# Patient Record
Sex: FEMALE | Race: WHITE | Employment: UNEMPLOYED | URBAN - METROPOLITAN AREA
[De-identification: names, ages, dates, MRNs, and addresses within clinical notes are randomized per-mention and may not be internally consistent; named-entity substitution may affect disease eponyms.]

---

## 2017-03-11 ENCOUNTER — HOSPITAL ENCOUNTER (EMERGENCY)
Age: 77
Discharge: HOME | End: 2017-03-11
Attending: STUDENT IN AN ORGANIZED HEALTH CARE EDUCATION/TRAINING PROGRAM
Payer: MEDICARE

## 2017-03-11 VITALS
HEIGHT: 64 IN | OXYGEN SATURATION: 94 % | RESPIRATION RATE: 20 BRPM | TEMPERATURE: 97.6 F | HEART RATE: 83 BPM | WEIGHT: 126 LBS | BODY MASS INDEX: 21.51 KG/M2 | SYSTOLIC BLOOD PRESSURE: 149 MMHG | DIASTOLIC BLOOD PRESSURE: 90 MMHG

## 2017-03-11 DIAGNOSIS — R04.0 EPISTAXIS: Primary | ICD-10-CM

## 2017-03-11 DIAGNOSIS — S00.412A EAR ABRASION, LEFT, INITIAL ENCOUNTER: ICD-10-CM

## 2017-03-11 PROCEDURE — 99283 EMERGENCY DEPT VISIT LOW MDM: CPT

## 2017-03-11 PROCEDURE — 30901 CONTROL OF NOSEBLEED: CPT

## 2017-03-11 PROCEDURE — 6370000000 HC RX 637 (ALT 250 FOR IP): Performed by: NURSE PRACTITIONER

## 2017-03-11 RX ORDER — AMOXICILLIN AND CLAVULANATE POTASSIUM 875; 125 MG/1; MG/1
1 TABLET, FILM COATED ORAL ONCE
Status: COMPLETED | OUTPATIENT
Start: 2017-03-11 | End: 2017-03-11

## 2017-03-11 RX ORDER — AMOXICILLIN AND CLAVULANATE POTASSIUM 875; 125 MG/1; MG/1
1 TABLET, FILM COATED ORAL 2 TIMES DAILY
Qty: 20 TABLET | Refills: 0 | Status: SHIPPED | OUTPATIENT
Start: 2017-03-11 | End: 2017-03-21

## 2017-03-11 RX ORDER — 0.9 % SODIUM CHLORIDE 0.9 %
1000 INTRAVENOUS SOLUTION INTRAVENOUS ONCE
Status: DISCONTINUED | OUTPATIENT
Start: 2017-03-11 | End: 2017-03-11

## 2017-03-11 RX ADMIN — AMOXICILLIN AND CLAVULANATE POTASSIUM 1 TABLET: 875; 125 TABLET, FILM COATED ORAL at 09:06

## 2017-03-11 ASSESSMENT — ENCOUNTER SYMPTOMS
VOMITING: 0
CHEST TIGHTNESS: 0
ABDOMINAL PAIN: 0
NAUSEA: 0
SHORTNESS OF BREATH: 0
SORE THROAT: 0

## 2017-03-14 ENCOUNTER — TELEPHONE (OUTPATIENT)
Dept: FAMILY MEDICINE CLINIC | Age: 77
End: 2017-03-14

## 2017-03-14 PROBLEM — R04.0 EPISTAXIS: Status: ACTIVE | Noted: 2017-03-14

## 2017-03-15 ENCOUNTER — OFFICE VISIT (OUTPATIENT)
Dept: CARDIOLOGY | Age: 77
End: 2017-03-15

## 2017-03-15 VITALS
OXYGEN SATURATION: 96 % | HEIGHT: 64 IN | RESPIRATION RATE: 14 BRPM | SYSTOLIC BLOOD PRESSURE: 138 MMHG | BODY MASS INDEX: 22.4 KG/M2 | HEART RATE: 67 BPM | WEIGHT: 131.2 LBS | DIASTOLIC BLOOD PRESSURE: 64 MMHG

## 2017-03-15 DIAGNOSIS — I10 BENIGN ESSENTIAL HYPERTENSION: Primary | ICD-10-CM

## 2017-03-15 DIAGNOSIS — I48.91 ATRIAL FIBRILLATION WITH RAPID VENTRICULAR RESPONSE (HCC): ICD-10-CM

## 2017-03-15 PROCEDURE — 99213 OFFICE O/P EST LOW 20 MIN: CPT | Performed by: INTERNAL MEDICINE

## 2017-03-15 ASSESSMENT — ENCOUNTER SYMPTOMS
CHEST TIGHTNESS: 0
SHORTNESS OF BREATH: 0

## 2017-03-28 ENCOUNTER — OFFICE VISIT (OUTPATIENT)
Dept: FAMILY MEDICINE CLINIC | Age: 77
End: 2017-03-28

## 2017-03-28 VITALS
BODY MASS INDEX: 24.99 KG/M2 | OXYGEN SATURATION: 97 % | HEIGHT: 62 IN | WEIGHT: 135.8 LBS | SYSTOLIC BLOOD PRESSURE: 122 MMHG | HEART RATE: 89 BPM | TEMPERATURE: 98.7 F | DIASTOLIC BLOOD PRESSURE: 84 MMHG

## 2017-03-28 DIAGNOSIS — I10 ESSENTIAL HYPERTENSION: Primary | ICD-10-CM

## 2017-03-28 DIAGNOSIS — R04.0 EPISTAXIS: ICD-10-CM

## 2017-03-28 DIAGNOSIS — I48.19 PERSISTENT ATRIAL FIBRILLATION (HCC): ICD-10-CM

## 2017-03-28 PROCEDURE — 99213 OFFICE O/P EST LOW 20 MIN: CPT | Performed by: NURSE PRACTITIONER

## 2017-03-28 RX ORDER — METOPROLOL SUCCINATE 50 MG/1
50 TABLET, EXTENDED RELEASE ORAL 2 TIMES DAILY
Qty: 60 TABLET | Refills: 5 | Status: SHIPPED | OUTPATIENT
Start: 2017-03-28 | End: 2018-02-26 | Stop reason: SDUPTHER

## 2017-03-28 ASSESSMENT — PATIENT HEALTH QUESTIONNAIRE - PHQ9
2. FEELING DOWN, DEPRESSED OR HOPELESS: 0
1. LITTLE INTEREST OR PLEASURE IN DOING THINGS: 0
SUM OF ALL RESPONSES TO PHQ QUESTIONS 1-9: 0
SUM OF ALL RESPONSES TO PHQ9 QUESTIONS 1 & 2: 0

## 2017-04-10 ENCOUNTER — OFFICE VISIT (OUTPATIENT)
Dept: FAMILY MEDICINE CLINIC | Age: 77
End: 2017-04-10

## 2017-04-10 VITALS
BODY MASS INDEX: 24.92 KG/M2 | DIASTOLIC BLOOD PRESSURE: 88 MMHG | WEIGHT: 135.4 LBS | HEIGHT: 62 IN | SYSTOLIC BLOOD PRESSURE: 130 MMHG | TEMPERATURE: 98.3 F | HEART RATE: 94 BPM | OXYGEN SATURATION: 97 %

## 2017-04-10 DIAGNOSIS — R23.8 SKIN PAPULES, GENERALIZED: ICD-10-CM

## 2017-04-10 DIAGNOSIS — I48.91 ATRIAL FIBRILLATION WITH RAPID VENTRICULAR RESPONSE (HCC): Primary | ICD-10-CM

## 2017-04-10 PROCEDURE — 99212 OFFICE O/P EST SF 10 MIN: CPT | Performed by: NURSE PRACTITIONER

## 2017-04-10 ASSESSMENT — PATIENT HEALTH QUESTIONNAIRE - PHQ9
SUM OF ALL RESPONSES TO PHQ QUESTIONS 1-9: 0
2. FEELING DOWN, DEPRESSED OR HOPELESS: 0
SUM OF ALL RESPONSES TO PHQ9 QUESTIONS 1 & 2: 0
1. LITTLE INTEREST OR PLEASURE IN DOING THINGS: 0

## 2017-05-08 ENCOUNTER — PROCEDURE VISIT (OUTPATIENT)
Dept: FAMILY MEDICINE CLINIC | Age: 77
End: 2017-05-08

## 2017-05-08 VITALS
WEIGHT: 141 LBS | HEART RATE: 79 BPM | SYSTOLIC BLOOD PRESSURE: 116 MMHG | DIASTOLIC BLOOD PRESSURE: 86 MMHG | BODY MASS INDEX: 25.95 KG/M2 | HEIGHT: 62 IN

## 2017-05-08 DIAGNOSIS — L82.0 INFLAMED SEBORRHEIC KERATOSIS: ICD-10-CM

## 2017-05-08 PROCEDURE — 17110 DESTRUCTION B9 LES UP TO 14: CPT | Performed by: FAMILY MEDICINE

## 2017-05-08 RX ORDER — LANOLIN ALCOHOL/MO/W.PET/CERES
1000 CREAM (GRAM) TOPICAL DAILY
COMMUNITY
End: 2017-06-08 | Stop reason: SDUPTHER

## 2017-05-08 ASSESSMENT — ENCOUNTER SYMPTOMS
ABDOMINAL PAIN: 0
SHORTNESS OF BREATH: 0

## 2017-06-08 ENCOUNTER — OFFICE VISIT (OUTPATIENT)
Dept: FAMILY MEDICINE CLINIC | Age: 77
End: 2017-06-08

## 2017-06-08 VITALS
SYSTOLIC BLOOD PRESSURE: 138 MMHG | OXYGEN SATURATION: 98 % | HEART RATE: 80 BPM | HEIGHT: 62 IN | WEIGHT: 136 LBS | TEMPERATURE: 98.1 F | BODY MASS INDEX: 25.03 KG/M2 | DIASTOLIC BLOOD PRESSURE: 80 MMHG

## 2017-06-08 DIAGNOSIS — L21.9 SEBORRHEIC DERMATITIS OF SCALP: ICD-10-CM

## 2017-06-08 DIAGNOSIS — I48.20 CHRONIC ATRIAL FIBRILLATION (HCC): Primary | ICD-10-CM

## 2017-06-08 DIAGNOSIS — G25.81 RESTLESS LEG: ICD-10-CM

## 2017-06-08 PROCEDURE — 99214 OFFICE O/P EST MOD 30 MIN: CPT | Performed by: NURSE PRACTITIONER

## 2017-06-08 RX ORDER — SELENIUM SULFIDE 22.5 MG/ML
1 SHAMPOO TOPICAL DAILY
Qty: 180 ML | Refills: 1 | Status: SHIPPED | OUTPATIENT
Start: 2017-06-08 | End: 2018-08-09

## 2017-06-08 RX ORDER — PRAMIPEXOLE DIHYDROCHLORIDE 0.12 MG/1
0.12 TABLET ORAL NIGHTLY
Qty: 30 TABLET | Refills: 5 | Status: SHIPPED | OUTPATIENT
Start: 2017-06-08 | End: 2017-11-20

## 2017-06-21 RX ORDER — ALENDRONATE SODIUM 70 MG/1
TABLET ORAL
Qty: 4 TABLET | Refills: 2 | Status: SHIPPED | OUTPATIENT
Start: 2017-06-21 | End: 2017-09-18 | Stop reason: SDUPTHER

## 2017-06-30 RX ORDER — SOTALOL HYDROCHLORIDE 120 MG/1
TABLET ORAL
Qty: 60 TABLET | Refills: 6 | Status: SHIPPED | OUTPATIENT
Start: 2017-06-30 | End: 2018-02-13 | Stop reason: SDUPTHER

## 2017-09-18 RX ORDER — ALENDRONATE SODIUM 70 MG/1
TABLET ORAL
Qty: 4 TABLET | Refills: 1 | Status: SHIPPED | OUTPATIENT
Start: 2017-09-18 | End: 2017-12-06 | Stop reason: SDUPTHER

## 2017-09-28 DIAGNOSIS — F41.9 ANXIETY: ICD-10-CM

## 2017-09-28 RX ORDER — SERTRALINE HYDROCHLORIDE 100 MG/1
TABLET, FILM COATED ORAL
Qty: 45 TABLET | Refills: 4 | Status: SHIPPED | OUTPATIENT
Start: 2017-09-28 | End: 2018-02-21 | Stop reason: SDUPTHER

## 2017-11-20 ENCOUNTER — OFFICE VISIT (OUTPATIENT)
Dept: FAMILY MEDICINE CLINIC | Age: 77
End: 2017-11-20

## 2017-11-20 VITALS
WEIGHT: 140.2 LBS | DIASTOLIC BLOOD PRESSURE: 88 MMHG | BODY MASS INDEX: 25.8 KG/M2 | HEART RATE: 72 BPM | OXYGEN SATURATION: 94 % | TEMPERATURE: 98.3 F | SYSTOLIC BLOOD PRESSURE: 130 MMHG | HEIGHT: 62 IN

## 2017-11-20 DIAGNOSIS — G25.81 RESTLESS LEGS: Primary | ICD-10-CM

## 2017-11-20 DIAGNOSIS — I48.19 PERSISTENT ATRIAL FIBRILLATION (HCC): ICD-10-CM

## 2017-11-20 DIAGNOSIS — T14.90XD HEALING WOUND: ICD-10-CM

## 2017-11-20 PROCEDURE — 99213 OFFICE O/P EST LOW 20 MIN: CPT | Performed by: NURSE PRACTITIONER

## 2017-11-20 RX ORDER — ASPIRIN 325 MG
325 TABLET ORAL DAILY
Status: ON HOLD | COMMUNITY
End: 2018-12-04

## 2017-11-20 RX ORDER — TIZANIDINE 2 MG/1
2 TABLET ORAL NIGHTLY PRN
Qty: 30 TABLET | Refills: 2 | Status: SHIPPED | OUTPATIENT
Start: 2017-11-20 | End: 2018-02-13 | Stop reason: ALTCHOICE

## 2017-11-20 NOTE — PROGRESS NOTES
Subjective  Chief Complaint   Patient presents with    Chronic Pain     pt sates she got sores on her rgt leg and rght arm that are not healing       HPI     Pt here wanting to get her leg and arm checked because she has a sore on both. She mentions that she keeps accidentally picking it off. Although it does now seem to be improving. Also, pt struggling with restless legs at night. Tried mirapex and didn't like it. She is asking about maybe just a muscle relaxer for it. Also of note, pt is off of her eliquis. She was getting frequent nosebleeds and despite trying a lower dose of it she could no longer tolerate it. Thus, she states that she is on 325mg aspirin daily.      Patient Active Problem List    Diagnosis Date Noted    Inflamed seborrheic keratosis     Epistaxis 03/14/2017    Atrial fibrillation with rapid ventricular response ([facility] [address] 06/08/2016    Atopic rhinitis 04/08/2016    Calculus of gallbladder 04/08/2016    Calculus of kidney 04/08/2016    Benign essential hypertension 04/08/2016    Atypical migraine 04/08/2016    Muscle pain 04/08/2016    Anxiety state 04/08/2016    Abnormal finding on thyroid function test 07/13/2005    Postmenopausal status 05/27/2005     Past Medical History:   Diagnosis Date    Allergic rhinitis     Anxiety     Atrial fibrillation (HCC)     Chronic kidney disease     Epistaxis 3/14/2017    Headache     Hypertension     Hypothyroidism     Inflamed seborrheic keratosis      Past Surgical History:   Procedure Laterality Date    HYSTERECTOMY       Family History   Problem Relation Age of Onset    Cancer Sister      colon, brain     Social History     Social History    Marital status:      Spouse name: N/A    Number of children: N/A    Years of education: N/A     Social History Main Topics    Smoking status: Never Smoker    Smokeless tobacco: Never Used    Alcohol use None    Drug use: Unknown    Sexual activity: Not Asked     Other Topics Concern    None     Social History Narrative    None     Current Outpatient Prescriptions on File Prior to Visit   Medication Sig Dispense Refill    sertraline (ZOLOFT) 100 MG tablet TAKE 1 & 1/2 TABLETS BY MOUTH EVERY DAY 45 tablet 4    alendronate (FOSAMAX) 70 MG tablet TAKE 1 TABLET BY MOUTH ONCE EVERY 7 DAYS 4 tablet 1    sotalol (BETAPACE) 120 MG tablet TAKE ONE TABLET BY MOUTH TWO TIMES A DAY 60 tablet 6    Selenium Sulfide 2.25 % SHAM Apply 1 Squirt topically daily 180 mL 1    amLODIPine (NORVASC) 10 MG tablet TAKE ONE TABLET BY MOUTH EVERY DAY 30 tablet 5    metoprolol succinate (TOPROL XL) 50 MG extended release tablet Take 1 tablet by mouth 2 times daily 60 tablet 5    loratadine (CLARITIN) 10 MG tablet TAKE ONE TABLET BY MOUTH EVERY DAY, PRN 30 tablet 3    Cyanocobalamin (B-12 PO) Take 1,200 mg by mouth daily Pt said she splits a 2500mg dissolving tab in half so approximately 1200mg  Daily.  apixaban (ELIQUIS) 2.5 MG TABS tablet Take 1 tablet by mouth 2 times daily 60 tablet 5     No current facility-administered medications on file prior to visit. Allergies   Allergen Reactions    Dust Mite Extract     Molds & Smuts        Review of Systems   Musculoskeletal: Positive for myalgias. Skin: Positive for wound. Objective  Vitals:    11/20/17 1103   BP: 130/88   Site: Left Arm   Position: Sitting   Cuff Size: Medium Adult   Pulse: 72   Temp: 98.3 °F (36.8 °C)   TempSrc: Tympanic   SpO2: 94%   Weight: 140 lb 3.2 oz (63.6 kg)   Height: 5' 2\" (1.575 m)     Physical Exam   Constitutional: She is oriented to person, place, and time. She appears well-developed and well-nourished. Eyes: Conjunctivae are normal. Pupils are equal, round, and reactive to light. Cardiovascular: Normal rate. A regularly irregular rhythm present. Pulmonary/Chest: Effort normal and breath sounds normal.   Neurological: She is alert and oriented to person, place, and time.    Skin:        Psychiatric: She

## 2017-12-18 DIAGNOSIS — I10 ESSENTIAL HYPERTENSION: ICD-10-CM

## 2017-12-18 RX ORDER — AMLODIPINE BESYLATE 10 MG/1
TABLET ORAL
Qty: 30 TABLET | Refills: 4 | Status: SHIPPED | OUTPATIENT
Start: 2017-12-18 | End: 2018-02-26 | Stop reason: SDUPTHER

## 2018-01-09 ENCOUNTER — TELEPHONE (OUTPATIENT)
Dept: FAMILY MEDICINE CLINIC | Age: 78
End: 2018-01-09

## 2018-01-10 NOTE — TELEPHONE ENCOUNTER
I sent in the prescription. Use as little as possible. Not sure if insurance is going to cover it either.

## 2018-01-11 NOTE — ADDENDUM NOTE
Encounter addended by: Alverto Olivera on: 1/11/2018  2:40 PM<BR>    Actions taken: Image imported, Pend clinical note

## 2018-01-15 ENCOUNTER — TELEPHONE (OUTPATIENT)
Dept: FAMILY MEDICINE CLINIC | Age: 78
End: 2018-01-15

## 2018-02-12 RX ORDER — SOTALOL HYDROCHLORIDE 120 MG/1
TABLET ORAL
Qty: 60 TABLET | Refills: 5 | OUTPATIENT
Start: 2018-02-12

## 2018-02-13 ENCOUNTER — OFFICE VISIT (OUTPATIENT)
Dept: FAMILY MEDICINE CLINIC | Age: 78
End: 2018-02-13
Payer: COMMERCIAL

## 2018-02-13 VITALS
HEIGHT: 62 IN | BODY MASS INDEX: 26.24 KG/M2 | WEIGHT: 142.6 LBS | DIASTOLIC BLOOD PRESSURE: 88 MMHG | OXYGEN SATURATION: 99 % | TEMPERATURE: 96.5 F | HEART RATE: 95 BPM | SYSTOLIC BLOOD PRESSURE: 132 MMHG

## 2018-02-13 DIAGNOSIS — F41.9 ANXIETY: Primary | ICD-10-CM

## 2018-02-13 DIAGNOSIS — Z78.0 POST-MENOPAUSAL: ICD-10-CM

## 2018-02-13 DIAGNOSIS — I48.19 PERSISTENT ATRIAL FIBRILLATION (HCC): ICD-10-CM

## 2018-02-13 DIAGNOSIS — M85.80 OSTEOPENIA, UNSPECIFIED LOCATION: ICD-10-CM

## 2018-02-13 PROCEDURE — 99213 OFFICE O/P EST LOW 20 MIN: CPT | Performed by: NURSE PRACTITIONER

## 2018-02-13 RX ORDER — SOTALOL HYDROCHLORIDE 120 MG/1
TABLET ORAL
Qty: 60 TABLET | Refills: 6 | Status: SHIPPED | OUTPATIENT
Start: 2018-02-13 | End: 2018-02-20 | Stop reason: SDUPTHER

## 2018-02-13 RX ORDER — OMEGA-3S/DHA/EPA/FISH OIL/D3 300MG-1000
400 CAPSULE ORAL DAILY
COMMUNITY

## 2018-02-13 RX ORDER — MULTIVIT WITH MINERALS/LUTEIN
1000 TABLET ORAL DAILY
COMMUNITY

## 2018-02-13 ASSESSMENT — ENCOUNTER SYMPTOMS
SHORTNESS OF BREATH: 0
COUGH: 0

## 2018-02-20 DIAGNOSIS — I48.19 PERSISTENT ATRIAL FIBRILLATION (HCC): ICD-10-CM

## 2018-02-20 RX ORDER — ALENDRONATE SODIUM 70 MG/1
TABLET ORAL
Qty: 12 TABLET | Refills: 1 | Status: SHIPPED | OUTPATIENT
Start: 2018-02-20 | End: 2018-05-15 | Stop reason: SDUPTHER

## 2018-02-20 RX ORDER — SOTALOL HYDROCHLORIDE 120 MG/1
TABLET ORAL
Qty: 180 TABLET | Refills: 1 | Status: SHIPPED | OUTPATIENT
Start: 2018-02-20 | End: 2018-05-15 | Stop reason: SDUPTHER

## 2018-02-21 ENCOUNTER — HOSPITAL ENCOUNTER (OUTPATIENT)
Dept: WOMENS IMAGING | Age: 78
Discharge: HOME | End: 2018-02-23
Payer: COMMERCIAL

## 2018-02-21 DIAGNOSIS — Z78.0 POST-MENOPAUSAL: ICD-10-CM

## 2018-02-21 DIAGNOSIS — M85.80 OSTEOPENIA, UNSPECIFIED LOCATION: ICD-10-CM

## 2018-02-21 DIAGNOSIS — F41.9 ANXIETY: ICD-10-CM

## 2018-02-21 PROCEDURE — 77080 DXA BONE DENSITY AXIAL: CPT

## 2018-02-21 RX ORDER — SERTRALINE HYDROCHLORIDE 100 MG/1
TABLET, FILM COATED ORAL
Qty: 135 TABLET | Refills: 1 | Status: SHIPPED | OUTPATIENT
Start: 2018-02-21 | End: 2018-05-15 | Stop reason: SDUPTHER

## 2018-02-26 DIAGNOSIS — I10 ESSENTIAL HYPERTENSION: ICD-10-CM

## 2018-02-26 RX ORDER — AMLODIPINE BESYLATE 10 MG/1
TABLET ORAL
Qty: 90 TABLET | Refills: 1 | Status: SHIPPED | OUTPATIENT
Start: 2018-02-26 | End: 2018-05-15 | Stop reason: SDUPTHER

## 2018-02-26 RX ORDER — METOPROLOL SUCCINATE 50 MG/1
50 TABLET, EXTENDED RELEASE ORAL 2 TIMES DAILY
Qty: 180 TABLET | Refills: 1 | Status: SHIPPED | OUTPATIENT
Start: 2018-02-26 | End: 2018-05-15 | Stop reason: SDUPTHER

## 2018-03-05 ENCOUNTER — TELEPHONE (OUTPATIENT)
Dept: FAMILY MEDICINE CLINIC | Age: 78
End: 2018-03-05

## 2018-05-10 ENCOUNTER — TELEPHONE (OUTPATIENT)
Dept: FAMILY MEDICINE CLINIC | Age: 78
End: 2018-05-10

## 2018-05-15 DIAGNOSIS — I48.19 PERSISTENT ATRIAL FIBRILLATION (HCC): ICD-10-CM

## 2018-05-15 DIAGNOSIS — F41.9 ANXIETY: ICD-10-CM

## 2018-05-15 DIAGNOSIS — I10 ESSENTIAL HYPERTENSION: ICD-10-CM

## 2018-05-15 RX ORDER — METOPROLOL SUCCINATE 50 MG/1
50 TABLET, EXTENDED RELEASE ORAL 2 TIMES DAILY
Qty: 180 TABLET | Refills: 1 | Status: ON HOLD | OUTPATIENT
Start: 2018-05-15 | End: 2018-12-04

## 2018-05-15 RX ORDER — ALENDRONATE SODIUM 70 MG/1
TABLET ORAL
Qty: 12 TABLET | Refills: 1 | Status: SHIPPED | OUTPATIENT
Start: 2018-05-15 | End: 2019-10-18 | Stop reason: ALTCHOICE

## 2018-05-15 RX ORDER — SOTALOL HYDROCHLORIDE 120 MG/1
TABLET ORAL
Qty: 180 TABLET | Refills: 1 | Status: SHIPPED | OUTPATIENT
Start: 2018-05-15 | End: 2018-12-13

## 2018-05-15 RX ORDER — AMLODIPINE BESYLATE 10 MG/1
TABLET ORAL
Qty: 90 TABLET | Refills: 1 | Status: SHIPPED | OUTPATIENT
Start: 2018-05-15 | End: 2019-02-11 | Stop reason: SDUPTHER

## 2018-05-15 RX ORDER — SERTRALINE HYDROCHLORIDE 100 MG/1
TABLET, FILM COATED ORAL
Qty: 135 TABLET | Refills: 1 | Status: SHIPPED | OUTPATIENT
Start: 2018-05-15 | End: 2019-07-23 | Stop reason: SDUPTHER

## 2018-08-09 ENCOUNTER — OFFICE VISIT (OUTPATIENT)
Dept: FAMILY MEDICINE CLINIC | Age: 78
End: 2018-08-09
Payer: COMMERCIAL

## 2018-08-09 VITALS
HEART RATE: 68 BPM | DIASTOLIC BLOOD PRESSURE: 90 MMHG | BODY MASS INDEX: 24.88 KG/M2 | WEIGHT: 135.2 LBS | SYSTOLIC BLOOD PRESSURE: 122 MMHG | TEMPERATURE: 96.8 F | OXYGEN SATURATION: 98 % | HEIGHT: 62 IN

## 2018-08-09 DIAGNOSIS — L98.9 SKIN LESION: Primary | ICD-10-CM

## 2018-08-09 DIAGNOSIS — R68.2 DRY MOUTH: ICD-10-CM

## 2018-08-09 PROCEDURE — 99213 OFFICE O/P EST LOW 20 MIN: CPT | Performed by: NURSE PRACTITIONER

## 2018-08-09 ASSESSMENT — ENCOUNTER SYMPTOMS
COLOR CHANGE: 1
SHORTNESS OF BREATH: 0
COUGH: 0

## 2018-08-09 ASSESSMENT — PATIENT HEALTH QUESTIONNAIRE - PHQ9
SUM OF ALL RESPONSES TO PHQ QUESTIONS 1-9: 0
2. FEELING DOWN, DEPRESSED OR HOPELESS: 0
1. LITTLE INTEREST OR PLEASURE IN DOING THINGS: 0
SUM OF ALL RESPONSES TO PHQ9 QUESTIONS 1 & 2: 0
SUM OF ALL RESPONSES TO PHQ QUESTIONS 1-9: 0

## 2018-09-10 ENCOUNTER — OFFICE VISIT (OUTPATIENT)
Dept: FAMILY MEDICINE CLINIC | Age: 78
End: 2018-09-10
Payer: COMMERCIAL

## 2018-09-10 VITALS
HEART RATE: 94 BPM | SYSTOLIC BLOOD PRESSURE: 122 MMHG | WEIGHT: 134.4 LBS | HEIGHT: 62 IN | DIASTOLIC BLOOD PRESSURE: 80 MMHG | OXYGEN SATURATION: 99 % | BODY MASS INDEX: 24.73 KG/M2

## 2018-09-10 DIAGNOSIS — L29.9 PRURITUS OF SKIN: ICD-10-CM

## 2018-09-10 DIAGNOSIS — D23.71 BENIGN NEOPLASM OF SKIN OF RIGHT LOWER EXTREMITY: Primary | ICD-10-CM

## 2018-09-10 PROCEDURE — 11302 SHAVE SKIN LESION 1.1-2.0 CM: CPT | Performed by: FAMILY MEDICINE

## 2018-09-10 ASSESSMENT — ENCOUNTER SYMPTOMS
SHORTNESS OF BREATH: 0
ABDOMINAL PAIN: 0

## 2018-09-10 NOTE — PATIENT INSTRUCTIONS
The use of sunscreen with an SPF of 30 or higher was discussed and recommended. Be sure to use sunscreen on your lips and wear sunglasses to protect your eyes. If you have a history of precancers or skin cancer, you should be seen AT LEAST once yearly for a skin check or sooner if recommended by your doctor. You should follow up immediately for any new or changing lesions. Clean surgical area with antibacterial soap and water once daily. Keep surgical site moist with vaseline or polysporin and apply a fresh bandage daily for 14 days. Follow up immediately if any redness surrounds the surgical site or if drainage occurs at surgical site. After 14 day no more bandage needed.

## 2018-09-10 NOTE — PROGRESS NOTES
Subjective  Anuradha Tran, 70 y.o. female presents today with:  Chief Complaint   Patient presents with    Lesion(s)     right calf       HPI    Pt of Sandra's here today for evaluation of a skin lesion on her right lower leg that has been there for a year. She thought it was a bug bite. It is itchy. Review of Systems   Constitutional: Negative for fever. Respiratory: Negative for shortness of breath. Cardiovascular: Negative for chest pain. Gastrointestinal: Negative for abdominal pain. Skin: Negative for rash. Past Medical History:   Diagnosis Date    Allergic rhinitis     Anxiety     Atrial fibrillation (HCC)     Chronic kidney disease     Epistaxis 3/14/2017    Headache     Hypertension     Hypothyroidism     Inflamed seborrheic keratosis      Past Surgical History:   Procedure Laterality Date    HYSTERECTOMY       Social History     Social History    Marital status:      Spouse name: N/A    Number of children: N/A    Years of education: N/A     Occupational History    Not on file.      Social History Main Topics    Smoking status: Never Smoker    Smokeless tobacco: Never Used    Alcohol use Not on file    Drug use: Unknown    Sexual activity: Not on file     Other Topics Concern    Not on file     Social History Narrative    No narrative on file     Family History   Problem Relation Age of Onset    Cancer Sister         colon, brain     Allergies   Allergen Reactions    Dust Mite Extract     Molds & Smuts      Current Outpatient Prescriptions   Medication Sig Dispense Refill    Cetirizine HCl (ZYRTEC ALLERGY) 10 MG CAPS Take by mouth      sertraline (ZOLOFT) 100 MG tablet TAKE 1 & 1/2 TABLETS BY MOUTH EVERY  tablet 1    metoprolol succinate (TOPROL XL) 50 MG extended release tablet Take 1 tablet by mouth 2 times daily 180 tablet 1    sotalol (BETAPACE) 120 MG tablet TAKE ONE TABLET BY MOUTH TWO TIMES A  tablet 1    amLODIPine (NORVASC) 10 MG tablet TAKE ONE TABLET BY MOUTH EVERY DAY 90 tablet 1    alendronate (FOSAMAX) 70 MG tablet take 1 tablet by mouth once every 7 days. 12 tablet 1    B COMPLEX-C ER PO Take by mouth daily      vitamin D3 (CHOLECALCIFEROL) 400 units TABS tablet Take 400 Units by mouth daily       Ascorbic Acid (VITAMIN C) 1000 MG tablet Take 1,000 mg by mouth 2 times daily      Cyanocobalamin (B-12 PO) Take 1,000 mg by mouth daily Pt said she splits a 2500mg dissolving tab in half so approximately 1200mg  Daily.  aspirin 325 MG tablet Take 325 mg by mouth daily       No current facility-administered medications for this visit. Objective    Vitals:    09/10/18 1421   BP: 122/80   Pulse: 94   SpO2: 99%   Weight: 134 lb 6.4 oz (61 kg)   Height: 5' 2\" (1.575 m)       Physical Exam   Constitutional: She appears well-developed and well-nourished. HENT:   Head: Normocephalic and atraumatic. Skin: Skin is warm and dry. Right lower leg with 0.6 cm pink scaly papule       Assessment & Plan    Diagnosis Orders   1. Benign neoplasm of skin of right lower extremity  Specimen to Pathology Outpatient   2. Pruritus of skin  Specimen to Pathology Outpatient     Right lower leg, r/o prurigo nodularis. A shave excision done today using 15 blade scalpel. 1% Lidocaine with epi used for anesthesia. A 0.3 cm margin surrounded the entire lesion creating a total surgical size of 1.2 cm in diameter. Hyfercation at the base of the lesion. Informed consent given by the patient. Risks including infection, bleeding and scarring were discussed with the patient. No guarantee how the scar will look. Post op instructions were given. The patient will clean the area daily with antibacterial soap and water. Apply vaseline or antibiotic ointment and a clean bandage daily for 2 weeks. Path benign.     Orders Placed This Encounter   Procedures    Specimen to Pathology Outpatient     Excision right lower leg  Margins requested on all

## 2018-11-01 ENCOUNTER — OFFICE VISIT (OUTPATIENT)
Dept: FAMILY MEDICINE CLINIC | Age: 78
End: 2018-11-01
Payer: COMMERCIAL

## 2018-11-01 VITALS
DIASTOLIC BLOOD PRESSURE: 86 MMHG | TEMPERATURE: 97.5 F | OXYGEN SATURATION: 94 % | HEIGHT: 62 IN | WEIGHT: 135 LBS | SYSTOLIC BLOOD PRESSURE: 128 MMHG | HEART RATE: 55 BPM | BODY MASS INDEX: 24.84 KG/M2

## 2018-11-01 DIAGNOSIS — I48.91 ATRIAL FIBRILLATION WITH RAPID VENTRICULAR RESPONSE (HCC): Primary | ICD-10-CM

## 2018-11-01 DIAGNOSIS — F41.1 ANXIETY STATE: ICD-10-CM

## 2018-11-01 DIAGNOSIS — R44.0 AUDITORY HALLUCINATION: ICD-10-CM

## 2018-11-01 PROCEDURE — 93000 ELECTROCARDIOGRAM COMPLETE: CPT | Performed by: NURSE PRACTITIONER

## 2018-11-01 PROCEDURE — 99213 OFFICE O/P EST LOW 20 MIN: CPT | Performed by: NURSE PRACTITIONER

## 2018-11-01 ASSESSMENT — ENCOUNTER SYMPTOMS: SHORTNESS OF BREATH: 0

## 2018-11-02 ENCOUNTER — OFFICE VISIT (OUTPATIENT)
Dept: CARDIOLOGY CLINIC | Age: 78
End: 2018-11-02
Payer: COMMERCIAL

## 2018-11-02 ENCOUNTER — OFFICE VISIT (OUTPATIENT)
Dept: BEHAVIORAL/MENTAL HEALTH CLINIC | Age: 78
End: 2018-11-02
Payer: COMMERCIAL

## 2018-11-02 VITALS
OXYGEN SATURATION: 94 % | HEIGHT: 62 IN | DIASTOLIC BLOOD PRESSURE: 80 MMHG | BODY MASS INDEX: 25.03 KG/M2 | HEART RATE: 84 BPM | SYSTOLIC BLOOD PRESSURE: 120 MMHG | WEIGHT: 136 LBS

## 2018-11-02 DIAGNOSIS — F41.1 GENERALIZED ANXIETY DISORDER: Primary | ICD-10-CM

## 2018-11-02 DIAGNOSIS — I48.91 ATRIAL FIBRILLATION WITH RAPID VENTRICULAR RESPONSE (HCC): Primary | ICD-10-CM

## 2018-11-02 DIAGNOSIS — I10 BENIGN ESSENTIAL HYPERTENSION: ICD-10-CM

## 2018-11-02 PROCEDURE — 90791 PSYCH DIAGNOSTIC EVALUATION: CPT | Performed by: PSYCHOLOGIST

## 2018-11-02 PROCEDURE — 99214 OFFICE O/P EST MOD 30 MIN: CPT | Performed by: INTERNAL MEDICINE

## 2018-11-02 ASSESSMENT — PATIENT HEALTH QUESTIONNAIRE - PHQ9
1. LITTLE INTEREST OR PLEASURE IN DOING THINGS: 0
SUM OF ALL RESPONSES TO PHQ QUESTIONS 1-9: 3
SUM OF ALL RESPONSES TO PHQ QUESTIONS 1-9: 3
7. TROUBLE CONCENTRATING ON THINGS, SUCH AS READING THE NEWSPAPER OR WATCHING TELEVISION: 0
5. POOR APPETITE OR OVEREATING: 0
4. FEELING TIRED OR HAVING LITTLE ENERGY: 0
10. IF YOU CHECKED OFF ANY PROBLEMS, HOW DIFFICULT HAVE THESE PROBLEMS MADE IT FOR YOU TO DO YOUR WORK, TAKE CARE OF THINGS AT HOME, OR GET ALONG WITH OTHER PEOPLE: 0
3. TROUBLE FALLING OR STAYING ASLEEP: 3
8. MOVING OR SPEAKING SO SLOWLY THAT OTHER PEOPLE COULD HAVE NOTICED. OR THE OPPOSITE, BEING SO FIGETY OR RESTLESS THAT YOU HAVE BEEN MOVING AROUND A LOT MORE THAN USUAL: 0
2. FEELING DOWN, DEPRESSED OR HOPELESS: 0
6. FEELING BAD ABOUT YOURSELF - OR THAT YOU ARE A FAILURE OR HAVE LET YOURSELF OR YOUR FAMILY DOWN: 0
9. THOUGHTS THAT YOU WOULD BE BETTER OFF DEAD, OR OF HURTING YOURSELF: 0
SUM OF ALL RESPONSES TO PHQ9 QUESTIONS 1 & 2: 0
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
## 2018-11-08 ENCOUNTER — OFFICE VISIT (OUTPATIENT)
Dept: FAMILY MEDICINE CLINIC | Age: 78
End: 2018-11-08
Payer: COMMERCIAL

## 2018-11-08 VITALS
WEIGHT: 134 LBS | TEMPERATURE: 97.6 F | BODY MASS INDEX: 24.66 KG/M2 | SYSTOLIC BLOOD PRESSURE: 120 MMHG | HEART RATE: 103 BPM | DIASTOLIC BLOOD PRESSURE: 72 MMHG | HEIGHT: 62 IN | OXYGEN SATURATION: 98 %

## 2018-11-08 DIAGNOSIS — G47.00 INSOMNIA, UNSPECIFIED TYPE: ICD-10-CM

## 2018-11-08 DIAGNOSIS — I48.20 CHRONIC ATRIAL FIBRILLATION (HCC): Primary | ICD-10-CM

## 2018-11-08 DIAGNOSIS — I10 BENIGN ESSENTIAL HYPERTENSION: ICD-10-CM

## 2018-11-08 PROCEDURE — 99213 OFFICE O/P EST LOW 20 MIN: CPT | Performed by: NURSE PRACTITIONER

## 2018-11-08 ASSESSMENT — ENCOUNTER SYMPTOMS
COUGH: 0
SHORTNESS OF BREATH: 0

## 2018-11-08 NOTE — PROGRESS NOTES
Subjective  Chief Complaint   Patient presents with    Follow-up     1 week anxiety pt states that her medication is working. Pt believes her anxiety is stemming from lack of sleep. pt states that the melatonin did not help. HPI     Pt struggling with sleeping currently. Has tried different routines and nothing really seems to be working. Angus Herr gave pt REMfresh to try and it did not seem to help. Last night pt tried some tea, decreased coffee. It did seem to help. Going to start using music tapes to help her relax. Wants to just try otc medication such as melatonin a little while longer. Back on eliquis for the afib after seeing Dr. Alexandro Macedo. Is going to try and have a cardioversion. Pt ok with this. Pt states that she has not heard any of \"those sounds recently\"  Feels that it is related to a lack of sleep.     Patient Active Problem List    Diagnosis Date Noted    Inflamed seborrheic keratosis     Epistaxis 03/14/2017    Atrial fibrillation with rapid ventricular response ([facility] [address] 06/08/2016    Atopic rhinitis 04/08/2016    Calculus of gallbladder 04/08/2016    Calculus of kidney 04/08/2016    Benign essential hypertension 04/08/2016    Atypical migraine 04/08/2016    Muscle pain 04/08/2016    Anxiety state 04/08/2016    Abnormal finding on thyroid function test 07/13/2005    Postmenopausal status 05/27/2005     Past Medical History:   Diagnosis Date    Allergic rhinitis     Anxiety     Atrial fibrillation (HCC)     Chronic kidney disease     Epistaxis 3/14/2017    Headache     Hypertension     Hypothyroidism     Inflamed seborrheic keratosis      Past Surgical History:   Procedure Laterality Date    HYSTERECTOMY       Family History   Problem Relation Age of Onset    Cancer Sister         colon, brain     Social History     Social History    Marital status:      Spouse name: N/A    Number of children: N/A    Years of education: N/A     Social History Main

## 2018-11-16 ENCOUNTER — TELEPHONE (OUTPATIENT)
Dept: CARDIOLOGY CLINIC | Age: 78
End: 2018-11-16

## 2018-11-16 ENCOUNTER — OFFICE VISIT (OUTPATIENT)
Dept: BEHAVIORAL/MENTAL HEALTH CLINIC | Age: 78
End: 2018-11-16
Payer: COMMERCIAL

## 2018-11-16 DIAGNOSIS — F41.1 GENERALIZED ANXIETY DISORDER: Primary | ICD-10-CM

## 2018-11-16 PROCEDURE — 90832 PSYTX W PT 30 MINUTES: CPT | Performed by: PSYCHOLOGIST

## 2018-11-16 ASSESSMENT — PATIENT HEALTH QUESTIONNAIRE - PHQ9
6. FEELING BAD ABOUT YOURSELF - OR THAT YOU ARE A FAILURE OR HAVE LET YOURSELF OR YOUR FAMILY DOWN: 0
SUM OF ALL RESPONSES TO PHQ9 QUESTIONS 1 & 2: 0
9. THOUGHTS THAT YOU WOULD BE BETTER OFF DEAD, OR OF HURTING YOURSELF: 0
8. MOVING OR SPEAKING SO SLOWLY THAT OTHER PEOPLE COULD HAVE NOTICED. OR THE OPPOSITE, BEING SO FIGETY OR RESTLESS THAT YOU HAVE BEEN MOVING AROUND A LOT MORE THAN USUAL: 0
7. TROUBLE CONCENTRATING ON THINGS, SUCH AS READING THE NEWSPAPER OR WATCHING TELEVISION: 0
SUM OF ALL RESPONSES TO PHQ QUESTIONS 1-9: 3
SUM OF ALL RESPONSES TO PHQ QUESTIONS 1-9: 3
5. POOR APPETITE OR OVEREATING: 0
4. FEELING TIRED OR HAVING LITTLE ENERGY: 0
2. FEELING DOWN, DEPRESSED OR HOPELESS: 0
1. LITTLE INTEREST OR PLEASURE IN DOING THINGS: 0
10. IF YOU CHECKED OFF ANY PROBLEMS, HOW DIFFICULT HAVE THESE PROBLEMS MADE IT FOR YOU TO DO YOUR WORK, TAKE CARE OF THINGS AT HOME, OR GET ALONG WITH OTHER PEOPLE: 0
3. TROUBLE FALLING OR STAYING ASLEEP: 3

## 2018-11-16 NOTE — PROGRESS NOTES
4/10/2017 3/28/2017 8/17/2015   PHQ2 Score 0 0 0 0 0 0   PHQ9 Score 3 3 0 0 0 0     Interpretation of Total Score Depression Severity: 1-4 = Minimal depression, 5-9 = Mild depression, 10-14 = Moderate depression, 15-19 = Moderately severe depression, 20-27 = Severe depression      Diagnosis:    Generalized anxiety disorder      Diagnosis Date    Allergic rhinitis     Anxiety     Atrial fibrillation (HCC)     Chronic kidney disease     Epistaxis 3/14/2017    Headache     Hypertension     Hypothyroidism     Inflamed seborrheic keratosis            Plan:  Pt interventions:  Provided handout on  insomnia, [place]-setting to identify pt's primary goals for [facility] visit / overall health, Supportive techniques, Emphasized importance of regular practice of relaxation strategies to target / promote stress reduction and Reviewed Sleep Hygiene tips     Pt Behavioral Change Plan:  1. Try to do your stretching around 9 or 9:30pm  2. Use the list of sleep hygiene guidelines below to determine any additional strategies          Please note this report has been partially produced using speech recognition software  And may cause contain errors related to that system including grammar, punctuation and spelling as well as words and phrases that may seem inappropriate. If there are questions or concerns please feel free to contact me to clarify.

## 2018-11-16 NOTE — PATIENT INSTRUCTIONS
stick with if for at least 6-8 weeks. Planned Improvements of My Sleep Hygiene    Check Those That Apply:    _____ Avoid Caffeine 6-8 Hours Before Bedtime. I will not have caffeine after   ________ PM.    _____ Avoid Nicotine Before Bedtime. I will not have a cigarette after _________ PM.    _____ Limit Alcohol Use. I will not have more than _______ drink(s) in the evening.    _____ Avoid Use of Sleeping Pills. (If you are currently using them regularly, all  changes should be medical supervised by your medical provider). _____ Do Exercise Regularly, But Not Within 2 Hours of Bedtime. I will   ________________ for ____  minutes, on the following days:   ____________________________________________________.    _____ Ensure your Bedroom is a Comfortable Temperature, Quiet, and Dark and   Your Mattress and Pillow are good. I will make the  following changes to my   bedroom:_______________________________________________________   _______________________________________________________________  _______________________________________________________________  _______________________________________________________________    _____ Kush De Souza Take a Hot Bath 1-2 Hours Prior to Bedtime. I will take a hot bath about   ______ PM.    _____ Eat a Light Snack at Bedtime but Avoid Large or Problematic Foods. I will   eat  __________________  or __________________ or __________ before bed.    _____ Avoid Naps. I try not to nap, if I must, I will limit it to _______ minutes, about 8   hours after I awoke and will use alarm to limit my nap time. _____ Limit Time In Bed. I have been sleeping on average ______ hours per night,   therefore I will limit my time in bed to _____ hours (the same number).   If Im not   asleep in about 15 to 20 minutes I will get up and not return to bed until Im   sleepy.    _____ Stay on a Regular Sleep Schedule  I will get up at _______ AM, 7 days a  week, no matter how poorly I slept that night.

## 2018-11-30 ENCOUNTER — OFFICE VISIT (OUTPATIENT)
Dept: CARDIOLOGY CLINIC | Age: 78
End: 2018-11-30
Payer: COMMERCIAL

## 2018-11-30 VITALS
BODY MASS INDEX: 25.25 KG/M2 | HEIGHT: 62 IN | WEIGHT: 137.2 LBS | SYSTOLIC BLOOD PRESSURE: 122 MMHG | DIASTOLIC BLOOD PRESSURE: 86 MMHG | HEART RATE: 102 BPM

## 2018-11-30 DIAGNOSIS — I48.91 ATRIAL FIBRILLATION WITH RAPID VENTRICULAR RESPONSE (HCC): Primary | ICD-10-CM

## 2018-11-30 PROCEDURE — 93000 ELECTROCARDIOGRAM COMPLETE: CPT | Performed by: INTERNAL MEDICINE

## 2018-11-30 PROCEDURE — 99213 OFFICE O/P EST LOW 20 MIN: CPT | Performed by: INTERNAL MEDICINE

## 2018-11-30 NOTE — PROGRESS NOTES
Date    HYSTERECTOMY         Social History     Social History    Marital status:      Spouse name: N/A    Number of children: N/A    Years of education: N/A     Social History Main Topics    Smoking status: Never Smoker    Smokeless tobacco: Never Used    Alcohol use Not on file    Drug use: Unknown    Sexual activity: Not on file     Other Topics Concern    Not on file     Social History Narrative    No narrative on file       Family History   Problem Relation Age of Onset    Cancer Sister         colon, brain       Current Outpatient Prescriptions   Medication Sig Dispense Refill    apixaban (ELIQUIS) 5 MG TABS tablet Take 1 tablet by mouth 2 times daily 42 tablet 0    Cetirizine HCl (ZYRTEC ALLERGY) 10 MG CAPS Take by mouth      sertraline (ZOLOFT) 100 MG tablet TAKE 1 & 1/2 TABLETS BY MOUTH EVERY  tablet 1    metoprolol succinate (TOPROL XL) 50 MG extended release tablet Take 1 tablet by mouth 2 times daily 180 tablet 1    sotalol (BETAPACE) 120 MG tablet TAKE ONE TABLET BY MOUTH TWO TIMES A  tablet 1    amLODIPine (NORVASC) 10 MG tablet TAKE ONE TABLET BY MOUTH EVERY DAY 90 tablet 1    alendronate (FOSAMAX) 70 MG tablet take 1 tablet by mouth once every 7 days. 12 tablet 1    B COMPLEX-C ER PO Take by mouth daily      vitamin D3 (CHOLECALCIFEROL) 400 units TABS tablet Take 400 Units by mouth daily       Ascorbic Acid (VITAMIN C) 1000 MG tablet Take 1,000 mg by mouth 2 times daily      aspirin 325 MG tablet Take 325 mg by mouth daily      Cyanocobalamin (B-12 PO) Take 1,000 mg by mouth daily Pt said she splits a 2500mg dissolving tab in half so approximately 1200mg  Daily. No current facility-administered medications for this visit. Dust mite extract and Molds & smuts    Review of Systems:  General ROS: negative  Psychological ROS: negative  Hematological and Lymphatic ROS: No history of blood clots or bleeding disorder.    Respiratory ROS: no cough,

## 2018-12-04 ENCOUNTER — ANESTHESIA EVENT (OUTPATIENT)
Dept: CARDIAC CATH/INVASIVE PROCEDURES | Age: 78
End: 2018-12-04
Payer: MEDICARE

## 2018-12-04 ENCOUNTER — ANESTHESIA (OUTPATIENT)
Dept: CARDIAC CATH/INVASIVE PROCEDURES | Age: 78
End: 2018-12-04
Payer: MEDICARE

## 2018-12-04 ENCOUNTER — HOSPITAL ENCOUNTER (OUTPATIENT)
Dept: CARDIAC CATH/INVASIVE PROCEDURES | Age: 78
Discharge: HOME | End: 2018-12-04
Attending: INTERNAL MEDICINE | Admitting: INTERNAL MEDICINE
Payer: MEDICARE

## 2018-12-04 VITALS — SYSTOLIC BLOOD PRESSURE: 95 MMHG | OXYGEN SATURATION: 94 % | DIASTOLIC BLOOD PRESSURE: 72 MMHG

## 2018-12-04 VITALS
OXYGEN SATURATION: 90 % | SYSTOLIC BLOOD PRESSURE: 116 MMHG | WEIGHT: 137 LBS | DIASTOLIC BLOOD PRESSURE: 73 MMHG | RESPIRATION RATE: 18 BRPM | BODY MASS INDEX: 25.21 KG/M2 | HEIGHT: 62 IN | HEART RATE: 55 BPM

## 2018-12-04 DIAGNOSIS — I10 ESSENTIAL HYPERTENSION: ICD-10-CM

## 2018-12-04 LAB
ALBUMIN SERPL-MCNC: 4.6 G/DL (ref 3.9–4.9)
ALP BLD-CCNC: 69 U/L (ref 40–130)
ALT SERPL-CCNC: 18 U/L (ref 0–33)
ANION GAP SERPL CALCULATED.3IONS-SCNC: 13 MEQ/L (ref 7–13)
AST SERPL-CCNC: 20 U/L (ref 0–35)
BILIRUB SERPL-MCNC: 0.5 MG/DL (ref 0–1.2)
BUN BLDV-MCNC: 16 MG/DL (ref 8–23)
CALCIUM SERPL-MCNC: 9.6 MG/DL (ref 8.6–10.2)
CHLORIDE BLD-SCNC: 103 MEQ/L (ref 98–107)
CO2: 24 MEQ/L (ref 22–29)
CREAT SERPL-MCNC: 0.61 MG/DL (ref 0.5–0.9)
EKG ATRIAL RATE: 107 BPM
EKG ATRIAL RATE: 61 BPM
EKG P AXIS: 76 DEGREES
EKG P-R INTERVAL: 210 MS
EKG Q-T INTERVAL: 372 MS
EKG Q-T INTERVAL: 472 MS
EKG QRS DURATION: 74 MS
EKG QRS DURATION: 76 MS
EKG QTC CALCULATION (BAZETT): 462 MS
EKG QTC CALCULATION (BAZETT): 475 MS
EKG R AXIS: 16 DEGREES
EKG R AXIS: 20 DEGREES
EKG T AXIS: 16 DEGREES
EKG T AXIS: 31 DEGREES
EKG VENTRICULAR RATE: 61 BPM
EKG VENTRICULAR RATE: 93 BPM
GFR AFRICAN AMERICAN: >60
GFR NON-AFRICAN AMERICAN: >60
GLOBULIN: 2.9 G/DL (ref 2.3–3.5)
GLUCOSE BLD-MCNC: 107 MG/DL (ref 74–109)
POTASSIUM SERPL-SCNC: 4.1 MEQ/L (ref 3.5–5.1)
SODIUM BLD-SCNC: 140 MEQ/L (ref 132–144)
TOTAL PROTEIN: 7.5 G/DL (ref 6.4–8.1)

## 2018-12-04 PROCEDURE — 92960 CARDIOVERSION ELECTRIC EXT: CPT | Performed by: INTERNAL MEDICINE

## 2018-12-04 PROCEDURE — 80053 COMPREHEN METABOLIC PANEL: CPT

## 2018-12-04 PROCEDURE — 2580000003 HC RX 258: Performed by: INTERNAL MEDICINE

## 2018-12-04 PROCEDURE — 93005 ELECTROCARDIOGRAM TRACING: CPT

## 2018-12-04 PROCEDURE — 3700000000 HC ANESTHESIA ATTENDED CARE

## 2018-12-04 PROCEDURE — 3700000001 HC ADD 15 MINUTES (ANESTHESIA)

## 2018-12-04 PROCEDURE — 6360000002 HC RX W HCPCS: Performed by: ANESTHESIOLOGY

## 2018-12-04 RX ORDER — METOPROLOL SUCCINATE 50 MG/1
50 TABLET, EXTENDED RELEASE ORAL DAILY
Qty: 180 TABLET | Refills: 1 | Status: SHIPPED | OUTPATIENT
Start: 2018-12-04 | End: 2019-02-11 | Stop reason: SDUPTHER

## 2018-12-04 RX ORDER — SODIUM CHLORIDE 9 MG/ML
INJECTION, SOLUTION INTRAVENOUS CONTINUOUS
Status: DISCONTINUED | OUTPATIENT
Start: 2018-12-04 | End: 2018-12-04 | Stop reason: HOSPADM

## 2018-12-04 RX ORDER — SODIUM CHLORIDE 0.9 % (FLUSH) 0.9 %
10 SYRINGE (ML) INJECTION PRN
Status: DISCONTINUED | OUTPATIENT
Start: 2018-12-04 | End: 2018-12-04 | Stop reason: HOSPADM

## 2018-12-04 RX ORDER — SODIUM CHLORIDE 0.9 % (FLUSH) 0.9 %
10 SYRINGE (ML) INJECTION EVERY 12 HOURS SCHEDULED
Status: DISCONTINUED | OUTPATIENT
Start: 2018-12-04 | End: 2018-12-04 | Stop reason: HOSPADM

## 2018-12-04 RX ORDER — PROPOFOL 10 MG/ML
INJECTION, EMULSION INTRAVENOUS PRN
Status: DISCONTINUED | OUTPATIENT
Start: 2018-12-04 | End: 2018-12-04 | Stop reason: SDUPTHER

## 2018-12-04 RX ADMIN — PROPOFOL 60 MG: 10 INJECTION, EMULSION INTRAVENOUS at 15:15

## 2018-12-04 RX ADMIN — SODIUM CHLORIDE: 9 INJECTION, SOLUTION INTRAVENOUS at 15:05

## 2018-12-04 NOTE — ANESTHESIA PRE PROCEDURE
BMI:   Wt Readings from Last 3 Encounters:   12/04/18 137 lb (62.1 kg)   11/30/18 137 lb 3.2 oz (62.2 kg)   11/08/18 134 lb (60.8 kg)     Body mass index is 25.06 kg/m². CBC:   Lab Results   Component Value Date    WBC 4.8 09/13/2016    RBC 5.16 09/13/2016    HGB 15.0 09/13/2016    HCT 46.0 09/13/2016    MCV 89.2 09/13/2016    RDW 15.7 09/13/2016     09/13/2016       CMP:   Lab Results   Component Value Date     12/04/2018    K 4.1 12/04/2018     12/04/2018    CO2 24 12/04/2018    BUN 16 12/04/2018    CREATININE 0.61 12/04/2018    GFRAA >60.0 12/04/2018    LABGLOM >60.0 12/04/2018    GLUCOSE 107 12/04/2018    PROT 7.5 12/04/2018    CALCIUM 9.6 12/04/2018    BILITOT 0.5 12/04/2018    ALKPHOS 69 12/04/2018    AST 20 12/04/2018    ALT 18 12/04/2018       POC Tests: No results for input(s): POCGLU, POCNA, POCK, POCCL, POCBUN, POCHEMO, POCHCT in the last 72 hours.     Coags:   Lab Results   Component Value Date    PROTIME 10.7 12/28/2015    INR 1.0 12/28/2015    APTT 30.1 12/28/2015       HCG (If Applicable): No results found for: PREGTESTUR, PREGSERUM, HCG, HCGQUANT     ABGs: No results found for: PHART, PO2ART, ZAV6IPA, RAU2ULA, BEART, Q9VLFRYM     Type & Screen (If Applicable):  No results found for: LABABO, [address]    Anesthesia Evaluation  Patient summary reviewed and Nursing notes reviewed  Airway: Mallampati: II  TM distance: >3 FB   Neck ROM: full  Mouth opening: > = 3 FB Dental: normal exam         Pulmonary:Negative Pulmonary ROS                              Cardiovascular:    (+) hypertension:, dysrhythmias: atrial fibrillation,       ECG reviewed               Beta Blocker:  Dose within 24 Hrs         Neuro/Psych:   (+) headaches:,             GI/Hepatic/Renal: Neg GI/Hepatic/Renal ROS            Endo/Other:    (+) hypothyroidism, blood dyscrasia: anticoagulation therapy:., .                 Abdominal:           Vascular:

## 2018-12-06 PROCEDURE — 93010 ELECTROCARDIOGRAM REPORT: CPT | Performed by: INTERNAL MEDICINE

## 2018-12-13 ENCOUNTER — OFFICE VISIT (OUTPATIENT)
Dept: CARDIOLOGY CLINIC | Age: 78
End: 2018-12-13
Payer: COMMERCIAL

## 2018-12-13 ENCOUNTER — TELEPHONE (OUTPATIENT)
Dept: CARDIOLOGY CLINIC | Age: 78
End: 2018-12-13

## 2018-12-13 VITALS
DIASTOLIC BLOOD PRESSURE: 80 MMHG | HEIGHT: 62 IN | BODY MASS INDEX: 25.25 KG/M2 | WEIGHT: 137.2 LBS | SYSTOLIC BLOOD PRESSURE: 110 MMHG | HEART RATE: 95 BPM

## 2018-12-13 DIAGNOSIS — I48.19 PERSISTENT ATRIAL FIBRILLATION (HCC): Primary | ICD-10-CM

## 2018-12-13 DIAGNOSIS — I10 BENIGN ESSENTIAL HYPERTENSION: ICD-10-CM

## 2018-12-13 DIAGNOSIS — I48.91 ATRIAL FIBRILLATION WITH RAPID VENTRICULAR RESPONSE (HCC): ICD-10-CM

## 2018-12-13 PROCEDURE — 93000 ELECTROCARDIOGRAM COMPLETE: CPT | Performed by: INTERNAL MEDICINE

## 2018-12-13 PROCEDURE — 99214 OFFICE O/P EST MOD 30 MIN: CPT | Performed by: INTERNAL MEDICINE

## 2018-12-13 RX ORDER — MULTIVITAMIN/IRON/FOLIC ACID 18MG-0.4MG
TABLET ORAL DAILY
COMMUNITY

## 2018-12-13 NOTE — TELEPHONE ENCOUNTER
CALLED ALESSANDRA SAVAGE FOR ADMISSION ON 12/17/18 [employer]IKOS[facility] LOADING--THEY WILL CALL OFFICE IN THE MORNING WITH BED AND ROOM

## 2018-12-13 NOTE — PROGRESS NOTES
Chief Complaint   Patient presents with    Follow Up After Procedure     CARDIOVERSION       Patient presents for initial medical evaluation. Patient is followed on a regular basis by Dr. Corazon Zacarias, APRN - CNP. Would like to switch cardiologist. Has hx of Afibb since 2016. On sotalol 120mg BID and not on any NOAC's at this time. EKG yesterday shows afibb with CVR. Does not really feel that she is in afibb. Pt denies chest pain, dyspnea, dyspnea on exertion, change in exercise capacity, fatigue,  nausea, vomiting, diarrhea, constipation, motor weakness, insomnia, weight loss, syncope, dizziness, lightheadedness,  PND, orthopnea, or claudication. No nitro use. BP and hr are good. No LE discoloration or ulcers. No LE edema. No CHF type symptoms. Lipid profile is normal. No recent hospitalization. No change in meds. No hx of CVN. ECHO in 2016 EF of 50%, mod LAE, mild TR, PHTN 41mMHg. Nuc stress test in 2015 was normal.        11-30-18: EKG with Afibb at 103bpm. On Eliquis now for 6 weeks. No nose bleeds so far. Does not really feel that she is in afibb. Pt denies chest pain, dyspnea, dyspnea on exertion, change in exercise capacity, fatigue,  nausea, vomiting, diarrhea, constipation, motor weakness, insomnia, weight loss, syncope, dizziness, lightheadedness,  PND, orthopnea, or claudication. No nitro use. BP and hr are good. No LE discoloration or ulcers. No LE edema. No CHF type symptoms. Lipid profile is normal. No recent hospitalization. No change in meds. No hx of CVN. 12-13-18: s/p successful CVN but EKG today shows afibb with CVR. She felt it yesterday. She has been on sotalol 120mg BID, normal QTC. Pt denies chest pain, dyspnea, dyspnea on exertion, change in exercise capacity, fatigue,  nausea, vomiting, diarrhea, constipation, motor weakness, insomnia, weight loss, syncope, dizziness, lightheadedness, palpitations, PND, orthopnea, or claudication. No nitro use. BP and hr are good.  CAD is

## 2018-12-17 ENCOUNTER — HOSPITAL ENCOUNTER (INPATIENT)
Age: 78
LOS: 2 days | Discharge: HOME | DRG: 310 | End: 2018-12-19
Attending: INTERNAL MEDICINE | Admitting: INTERNAL MEDICINE
Payer: MEDICARE

## 2018-12-17 PROBLEM — I48.91 A-FIB (HCC): Status: ACTIVE | Noted: 2018-12-17

## 2018-12-17 LAB
ANION GAP SERPL CALCULATED.3IONS-SCNC: 11 MEQ/L (ref 7–13)
BUN BLDV-MCNC: 13 MG/DL (ref 8–23)
CALCIUM SERPL-MCNC: 9.4 MG/DL (ref 8.6–10.2)
CHLORIDE BLD-SCNC: 101 MEQ/L (ref 98–107)
CO2: 29 MEQ/L (ref 22–29)
CREAT SERPL-MCNC: 0.76 MG/DL (ref 0.5–0.9)
GFR AFRICAN AMERICAN: >60
GFR NON-AFRICAN AMERICAN: >60
GLUCOSE BLD-MCNC: 77 MG/DL (ref 74–109)
HCT VFR BLD CALC: 43 % (ref 37–47)
HEMOGLOBIN: 14.8 G/DL (ref 12–16)
MAGNESIUM: 2.4 MG/DL (ref 1.7–2.3)
MCH RBC QN AUTO: 32.5 PG (ref 27–31.3)
MCHC RBC AUTO-ENTMCNC: 34.5 % (ref 33–37)
MCV RBC AUTO: 94.1 FL (ref 82–100)
PDW BLD-RTO: 13.5 % (ref 11.5–14.5)
PLATELET # BLD: 196 K/UL (ref 130–400)
POTASSIUM SERPL-SCNC: 3.8 MEQ/L (ref 3.5–5.1)
RBC # BLD: 4.57 M/UL (ref 4.2–5.4)
SODIUM BLD-SCNC: 141 MEQ/L (ref 132–144)
WBC # BLD: 5.3 K/UL (ref 4.8–10.8)

## 2018-12-17 PROCEDURE — 85027 COMPLETE CBC AUTOMATED: CPT

## 2018-12-17 PROCEDURE — 6370000000 HC RX 637 (ALT 250 FOR IP): Performed by: NURSE PRACTITIONER

## 2018-12-17 PROCEDURE — APPNB30 APP NON BILLABLE TIME 0-30 MINS: Performed by: NURSE PRACTITIONER

## 2018-12-17 PROCEDURE — 36415 COLL VENOUS BLD VENIPUNCTURE: CPT

## 2018-12-17 PROCEDURE — 93005 ELECTROCARDIOGRAM TRACING: CPT

## 2018-12-17 PROCEDURE — 80048 BASIC METABOLIC PNL TOTAL CA: CPT

## 2018-12-17 PROCEDURE — 83735 ASSAY OF MAGNESIUM: CPT

## 2018-12-17 PROCEDURE — 2060000000 HC ICU INTERMEDIATE R&B

## 2018-12-17 PROCEDURE — 99222 1ST HOSP IP/OBS MODERATE 55: CPT | Performed by: INTERNAL MEDICINE

## 2018-12-17 RX ORDER — AMLODIPINE BESYLATE 10 MG/1
10 TABLET ORAL DAILY
Status: DISCONTINUED | OUTPATIENT
Start: 2018-12-17 | End: 2018-12-19 | Stop reason: HOSPADM

## 2018-12-17 RX ORDER — SOTALOL HYDROCHLORIDE 80 MG/1
80 TABLET ORAL 2 TIMES DAILY
Status: DISCONTINUED | OUTPATIENT
Start: 2018-12-17 | End: 2018-12-17

## 2018-12-17 RX ORDER — ASCORBIC ACID 500 MG
1000 TABLET ORAL DAILY
Status: DISCONTINUED | OUTPATIENT
Start: 2018-12-17 | End: 2018-12-19 | Stop reason: HOSPADM

## 2018-12-17 RX ORDER — DOFETILIDE 0.25 MG/1
250 CAPSULE ORAL EVERY 12 HOURS SCHEDULED
Status: DISCONTINUED | OUTPATIENT
Start: 2018-12-17 | End: 2018-12-19 | Stop reason: HOSPADM

## 2018-12-17 RX ORDER — METOPROLOL SUCCINATE 50 MG/1
50 TABLET, EXTENDED RELEASE ORAL DAILY
Status: DISCONTINUED | OUTPATIENT
Start: 2018-12-17 | End: 2018-12-19 | Stop reason: HOSPADM

## 2018-12-17 RX ORDER — SERTRALINE HYDROCHLORIDE 100 MG/1
100 TABLET, FILM COATED ORAL DAILY
Status: DISCONTINUED | OUTPATIENT
Start: 2018-12-17 | End: 2018-12-19 | Stop reason: HOSPADM

## 2018-12-17 RX ADMIN — DOFETILIDE 250 MCG: 0.25 CAPSULE ORAL at 20:47

## 2018-12-17 RX ADMIN — APIXABAN 5 MG: 5 TABLET, FILM COATED ORAL at 20:47

## 2018-12-17 ASSESSMENT — ENCOUNTER SYMPTOMS
ALLERGIC/IMMUNOLOGIC NEGATIVE: 1
CHOKING: 0
EYES NEGATIVE: 1
WHEEZING: 0
CHEST TIGHTNESS: 0
SHORTNESS OF BREATH: 0
APNEA: 0
COUGH: 0
GASTROINTESTINAL NEGATIVE: 1
STRIDOR: 0

## 2018-12-17 ASSESSMENT — PAIN SCALES - GENERAL
PAINLEVEL_OUTOF10: 0

## 2018-12-17 NOTE — FLOWSHEET NOTE
To [facility]. Oriented to room and call light. Pt here for med changes and afib management. Admission in progress.  Electronically signed by Nelia Booth RN on 12/17/2018 at 4:15 PM

## 2018-12-17 NOTE — H&P
History and Physical  Patient: Alphonse Mendoza  Unit/Bed:W184/W184-01  YOB: 1940  MRN: [id number]  Acct: [de-identified]   Admitting Diagnosis: A-fib [facility]) [I48.91]  Admit Date:  12/17/2018  Hospital Day: 0      Chief Complaint:   palpitations    History of Present Illness:   12/17/18  78y female was direct admit for tikosyn load  Patient occ has palpitations  History of persistent a-fib and HTN, currently taking eliquis  Pt denies chest pain, shortness of breath, nausea, vomiting, diarrhea, constipation, motor weakness, insomnia, weight loss, PND, orthopnea, or claudication. Patient presents for initial medical evaluation. Patient is followed on a regular basis by Dr. Efrain Suazo, APRN - CNP. Would like to switch cardiologist. Has hx of Afibb since 2016. On sotalol 120mg BID and not on any NOAC's at this time. EKG yesterday shows afibb with CVR. Does not really feel that she is in afibb. Pt denies chest pain, dyspnea, dyspnea on exertion, change in exercise capacity, fatigue,  nausea, vomiting, diarrhea, constipation, motor weakness, insomnia, weight loss, syncope, dizziness, lightheadedness,  PND, orthopnea, or claudication. No nitro use. BP and hr are good. No LE discoloration or ulcers. No LE edema. No CHF type symptoms. Lipid profile is normal. No recent hospitalization. No change in meds. No hx of CVN.      ECHO in 2016 EF of 50%, mod LAE, mild TR, PHTN 41mMHg. Nuc stress test in 2015 was normal.       11-30-18: EKG with Afibb at 103bpm. On Eliquis now for 6 weeks. No nose bleeds so far. Does not really feel that she is in afibb. Pt denies chest pain, dyspnea, dyspnea on exertion, change in exercise capacity, fatigue,  nausea, vomiting, diarrhea, constipation, motor weakness, insomnia, weight loss, syncope, dizziness, lightheadedness,  PND, orthopnea, or claudication. No nitro use. BP and hr are good. No LE discoloration or ulcers. No LE edema. No CHF type symptoms.  Lipid profile is normal. No

## 2018-12-18 LAB
ANION GAP SERPL CALCULATED.3IONS-SCNC: 15 MEQ/L (ref 7–13)
BUN BLDV-MCNC: 15 MG/DL (ref 8–23)
CALCIUM SERPL-MCNC: 9 MG/DL (ref 8.6–10.2)
CHLORIDE BLD-SCNC: 103 MEQ/L (ref 98–107)
CO2: 24 MEQ/L (ref 22–29)
CREAT SERPL-MCNC: 0.69 MG/DL (ref 0.5–0.9)
EKG ATRIAL RATE: 107 BPM
EKG ATRIAL RATE: 117 BPM
EKG ATRIAL RATE: 300 BPM
EKG ATRIAL RATE: 64 BPM
EKG Q-T INTERVAL: 352 MS
EKG Q-T INTERVAL: 380 MS
EKG Q-T INTERVAL: 398 MS
EKG Q-T INTERVAL: 408 MS
EKG QRS DURATION: 72 MS
EKG QRS DURATION: 74 MS
EKG QTC CALCULATION (BAZETT): 424 MS
EKG QTC CALCULATION (BAZETT): 435 MS
EKG QTC CALCULATION (BAZETT): 438 MS
EKG QTC CALCULATION (BAZETT): 473 MS
EKG R AXIS: 27 DEGREES
EKG R AXIS: 45 DEGREES
EKG R AXIS: 48 DEGREES
EKG R AXIS: 50 DEGREES
EKG T AXIS: 19 DEGREES
EKG T AXIS: 20 DEGREES
EKG T AXIS: 27 DEGREES
EKG T AXIS: 28 DEGREES
EKG VENTRICULAR RATE: 73 BPM
EKG VENTRICULAR RATE: 75 BPM
EKG VENTRICULAR RATE: 81 BPM
EKG VENTRICULAR RATE: 92 BPM
GFR AFRICAN AMERICAN: >60
GFR NON-AFRICAN AMERICAN: >60
GLUCOSE BLD-MCNC: 78 MG/DL (ref 74–109)
HCT VFR BLD CALC: 43.2 % (ref 37–47)
HEMOGLOBIN: 14.8 G/DL (ref 12–16)
LV EF: 60 %
LVEF MODALITY: NORMAL
MAGNESIUM: 2.5 MG/DL (ref 1.7–2.3)
MCH RBC QN AUTO: 32 PG (ref 27–31.3)
MCHC RBC AUTO-ENTMCNC: 34.2 % (ref 33–37)
MCV RBC AUTO: 93.8 FL (ref 82–100)
PDW BLD-RTO: 13.4 % (ref 11.5–14.5)
PLATELET # BLD: 165 K/UL (ref 130–400)
POTASSIUM SERPL-SCNC: 4 MEQ/L (ref 3.5–5.1)
RBC # BLD: 4.61 M/UL (ref 4.2–5.4)
SODIUM BLD-SCNC: 142 MEQ/L (ref 132–144)
WBC # BLD: 4.5 K/UL (ref 4.8–10.8)

## 2018-12-18 PROCEDURE — 93306 TTE W/DOPPLER COMPLETE: CPT

## 2018-12-18 PROCEDURE — 83735 ASSAY OF MAGNESIUM: CPT

## 2018-12-18 PROCEDURE — 85027 COMPLETE CBC AUTOMATED: CPT

## 2018-12-18 PROCEDURE — 6370000000 HC RX 637 (ALT 250 FOR IP): Performed by: NURSE PRACTITIONER

## 2018-12-18 PROCEDURE — 99232 SBSQ HOSP IP/OBS MODERATE 35: CPT | Performed by: INTERNAL MEDICINE

## 2018-12-18 PROCEDURE — 93010 ELECTROCARDIOGRAM REPORT: CPT | Performed by: INTERNAL MEDICINE

## 2018-12-18 PROCEDURE — 36415 COLL VENOUS BLD VENIPUNCTURE: CPT

## 2018-12-18 PROCEDURE — 6370000000 HC RX 637 (ALT 250 FOR IP): Performed by: INTERNAL MEDICINE

## 2018-12-18 PROCEDURE — 80048 BASIC METABOLIC PNL TOTAL CA: CPT

## 2018-12-18 PROCEDURE — 2060000000 HC ICU INTERMEDIATE R&B

## 2018-12-18 PROCEDURE — 93005 ELECTROCARDIOGRAM TRACING: CPT

## 2018-12-18 RX ORDER — ACETAMINOPHEN 325 MG/1
650 TABLET ORAL EVERY 4 HOURS PRN
Status: DISCONTINUED | OUTPATIENT
Start: 2018-12-18 | End: 2018-12-19 | Stop reason: CLARIF

## 2018-12-18 RX ADMIN — ACETAMINOPHEN 650 MG: 325 TABLET ORAL at 20:39

## 2018-12-18 RX ADMIN — DOFETILIDE 250 MCG: 0.25 CAPSULE ORAL at 20:29

## 2018-12-18 RX ADMIN — DOFETILIDE 250 MCG: 0.25 CAPSULE ORAL at 08:01

## 2018-12-18 RX ADMIN — OXYCODONE HYDROCHLORIDE AND ACETAMINOPHEN 1000 MG: 500 TABLET ORAL at 08:01

## 2018-12-18 RX ADMIN — AMLODIPINE BESYLATE 10 MG: 10 TABLET ORAL at 08:01

## 2018-12-18 RX ADMIN — METOPROLOL SUCCINATE 50 MG: 50 TABLET, EXTENDED RELEASE ORAL at 08:00

## 2018-12-18 RX ADMIN — APIXABAN 5 MG: 5 TABLET, FILM COATED ORAL at 08:01

## 2018-12-18 RX ADMIN — Medication 400 MG: at 08:00

## 2018-12-18 RX ADMIN — SERTRALINE 100 MG: 100 TABLET, FILM COATED ORAL at 08:01

## 2018-12-18 RX ADMIN — APIXABAN 5 MG: 5 TABLET, FILM COATED ORAL at 20:29

## 2018-12-18 ASSESSMENT — PAIN DESCRIPTION - PROGRESSION: CLINICAL_PROGRESSION: NOT CHANGED

## 2018-12-18 ASSESSMENT — PAIN DESCRIPTION - ONSET: ONSET: ON-GOING

## 2018-12-18 ASSESSMENT — ENCOUNTER SYMPTOMS
RESPIRATORY NEGATIVE: 1
GASTROINTESTINAL NEGATIVE: 1
CHEST TIGHTNESS: 0
NAUSEA: 0
EYES NEGATIVE: 1
WHEEZING: 0
BLOOD IN STOOL: 0
STRIDOR: 0
SHORTNESS OF BREATH: 0
COUGH: 0

## 2018-12-18 ASSESSMENT — PAIN DESCRIPTION - ORIENTATION: ORIENTATION: RIGHT;LEFT;MID

## 2018-12-18 ASSESSMENT — PAIN SCALES - GENERAL
PAINLEVEL_OUTOF10: 4
PAINLEVEL_OUTOF10: 0
PAINLEVEL_OUTOF10: 4
PAINLEVEL_OUTOF10: 0

## 2018-12-18 ASSESSMENT — PAIN DESCRIPTION - FREQUENCY: FREQUENCY: CONTINUOUS

## 2018-12-18 ASSESSMENT — PAIN DESCRIPTION - DESCRIPTORS: DESCRIPTORS: ACHING;DISCOMFORT;HEADACHE

## 2018-12-18 ASSESSMENT — PAIN DESCRIPTION - LOCATION: LOCATION: HEAD

## 2018-12-18 ASSESSMENT — PAIN DESCRIPTION - PAIN TYPE: TYPE: ACUTE PAIN

## 2018-12-18 NOTE — PROGRESS NOTES
Checked QT length before administering tikosyn tonight, Lopez Lay from tele told me the QT length was 0.40. Pt has been doing well tonight, has not had any complaints. Pt currently watching tv in her room.  Electronically signed by Rendall Cabot, RN on 12/18/2018 at 4:05 AM

## 2018-12-19 VITALS
HEART RATE: 62 BPM | TEMPERATURE: 97.3 F | HEIGHT: 62 IN | BODY MASS INDEX: 25.21 KG/M2 | SYSTOLIC BLOOD PRESSURE: 129 MMHG | RESPIRATION RATE: 18 BRPM | OXYGEN SATURATION: 95 % | WEIGHT: 137 LBS | DIASTOLIC BLOOD PRESSURE: 83 MMHG

## 2018-12-19 LAB
ANION GAP SERPL CALCULATED.3IONS-SCNC: 9 MEQ/L (ref 7–13)
BUN BLDV-MCNC: 19 MG/DL (ref 8–23)
CALCIUM SERPL-MCNC: 8.8 MG/DL (ref 8.6–10.2)
CHLORIDE BLD-SCNC: 103 MEQ/L (ref 98–107)
CO2: 27 MEQ/L (ref 22–29)
CREAT SERPL-MCNC: 0.68 MG/DL (ref 0.5–0.9)
GFR AFRICAN AMERICAN: >60
GFR NON-AFRICAN AMERICAN: >60
GLUCOSE BLD-MCNC: 90 MG/DL (ref 74–109)
HCT VFR BLD CALC: 44 % (ref 37–47)
HEMOGLOBIN: 15.2 G/DL (ref 12–16)
MAGNESIUM: 2.4 MG/DL (ref 1.7–2.3)
MCH RBC QN AUTO: 32.3 PG (ref 27–31.3)
MCHC RBC AUTO-ENTMCNC: 34.4 % (ref 33–37)
MCV RBC AUTO: 93.8 FL (ref 82–100)
PDW BLD-RTO: 13.5 % (ref 11.5–14.5)
PLATELET # BLD: 163 K/UL (ref 130–400)
POTASSIUM SERPL-SCNC: 4 MEQ/L (ref 3.5–5.1)
RBC # BLD: 4.69 M/UL (ref 4.2–5.4)
SODIUM BLD-SCNC: 139 MEQ/L (ref 132–144)
WBC # BLD: 4.7 K/UL (ref 4.8–10.8)

## 2018-12-19 PROCEDURE — 85027 COMPLETE CBC AUTOMATED: CPT

## 2018-12-19 PROCEDURE — 36415 COLL VENOUS BLD VENIPUNCTURE: CPT

## 2018-12-19 PROCEDURE — 93010 ELECTROCARDIOGRAM REPORT: CPT | Performed by: INTERNAL MEDICINE

## 2018-12-19 PROCEDURE — 6370000000 HC RX 637 (ALT 250 FOR IP): Performed by: NURSE PRACTITIONER

## 2018-12-19 PROCEDURE — 80048 BASIC METABOLIC PNL TOTAL CA: CPT

## 2018-12-19 PROCEDURE — 99238 HOSP IP/OBS DSCHRG MGMT 30/<: CPT | Performed by: INTERNAL MEDICINE

## 2018-12-19 PROCEDURE — 83735 ASSAY OF MAGNESIUM: CPT

## 2018-12-19 PROCEDURE — 93005 ELECTROCARDIOGRAM TRACING: CPT

## 2018-12-19 RX ORDER — DOFETILIDE 0.25 MG/1
250 CAPSULE ORAL EVERY 12 HOURS SCHEDULED
Qty: 60 CAPSULE | Refills: 3 | Status: SHIPPED | OUTPATIENT
Start: 2018-12-19 | End: 2019-01-03 | Stop reason: ALTCHOICE

## 2018-12-19 RX ORDER — ACETAMINOPHEN 325 MG/1
650 TABLET ORAL EVERY 4 HOURS PRN
Status: DISCONTINUED | OUTPATIENT
Start: 2018-12-19 | End: 2018-12-19 | Stop reason: HOSPADM

## 2018-12-19 RX ADMIN — APIXABAN 5 MG: 5 TABLET, FILM COATED ORAL at 09:38

## 2018-12-19 RX ADMIN — AMLODIPINE BESYLATE 10 MG: 10 TABLET ORAL at 09:38

## 2018-12-19 RX ADMIN — METOPROLOL SUCCINATE 50 MG: 50 TABLET, EXTENDED RELEASE ORAL at 09:39

## 2018-12-19 RX ADMIN — DOFETILIDE 250 MCG: 0.25 CAPSULE ORAL at 09:39

## 2018-12-19 RX ADMIN — OXYCODONE HYDROCHLORIDE AND ACETAMINOPHEN 1000 MG: 500 TABLET ORAL at 09:39

## 2018-12-19 RX ADMIN — Medication 400 MG: at 09:38

## 2018-12-19 RX ADMIN — SERTRALINE 100 MG: 100 TABLET, FILM COATED ORAL at 09:39

## 2018-12-19 ASSESSMENT — PAIN SCALES - GENERAL: PAINLEVEL_OUTOF10: 0

## 2018-12-20 ENCOUNTER — APPOINTMENT (OUTPATIENT)
Dept: GENERAL RADIOLOGY | Age: 78
End: 2018-12-20
Payer: MEDICARE

## 2018-12-20 ENCOUNTER — APPOINTMENT (OUTPATIENT)
Dept: CT IMAGING | Age: 78
End: 2018-12-20
Payer: MEDICARE

## 2018-12-20 ENCOUNTER — TELEPHONE (OUTPATIENT)
Dept: FAMILY MEDICINE CLINIC | Age: 78
End: 2018-12-20

## 2018-12-20 ENCOUNTER — HOSPITAL ENCOUNTER (OUTPATIENT)
Age: 78
Setting detail: OBSERVATION
Discharge: LEFT BEFORE DISCHARGE | End: 2018-12-20
Attending: EMERGENCY MEDICINE | Admitting: INTERNAL MEDICINE
Payer: MEDICARE

## 2018-12-20 VITALS
WEIGHT: 137.57 LBS | OXYGEN SATURATION: 97 % | SYSTOLIC BLOOD PRESSURE: 131 MMHG | DIASTOLIC BLOOD PRESSURE: 85 MMHG | RESPIRATION RATE: 18 BRPM | HEART RATE: 100 BPM | BODY MASS INDEX: 25.32 KG/M2 | TEMPERATURE: 97.7 F | HEIGHT: 62 IN

## 2018-12-20 DIAGNOSIS — I48.21 PERMANENT ATRIAL FIBRILLATION (HCC): ICD-10-CM

## 2018-12-20 DIAGNOSIS — T50.905A ADVERSE EFFECT OF DRUG, INITIAL ENCOUNTER: ICD-10-CM

## 2018-12-20 DIAGNOSIS — R27.0 ATAXIA: Primary | ICD-10-CM

## 2018-12-20 LAB
ALBUMIN SERPL-MCNC: 4.4 G/DL (ref 3.9–4.9)
ALP BLD-CCNC: 76 U/L (ref 40–130)
ALT SERPL-CCNC: 17 U/L (ref 0–33)
ANION GAP SERPL CALCULATED.3IONS-SCNC: 15 MEQ/L (ref 7–13)
AST SERPL-CCNC: 22 U/L (ref 0–35)
BACTERIA: NORMAL /HPF
BASOPHILS ABSOLUTE: 0.1 K/UL (ref 0–0.2)
BASOPHILS RELATIVE PERCENT: 2.6 %
BILIRUB SERPL-MCNC: 0.3 MG/DL (ref 0–1.2)
BILIRUBIN URINE: NEGATIVE
BLOOD, URINE: ABNORMAL
BUN BLDV-MCNC: 25 MG/DL (ref 8–23)
CALCIUM SERPL-MCNC: 9.1 MG/DL (ref 8.6–10.2)
CHLORIDE BLD-SCNC: 104 MEQ/L (ref 98–107)
CLARITY: CLEAR
CO2: 24 MEQ/L (ref 22–29)
COLOR: YELLOW
CREAT SERPL-MCNC: 0.63 MG/DL (ref 0.5–0.9)
EKG ATRIAL RATE: 111 BPM
EKG Q-T INTERVAL: 344 MS
EKG QRS DURATION: 68 MS
EKG QTC CALCULATION (BAZETT): 475 MS
EKG R AXIS: 36 DEGREES
EKG T AXIS: 9 DEGREES
EKG VENTRICULAR RATE: 115 BPM
EOSINOPHILS ABSOLUTE: 0.1 K/UL (ref 0–0.7)
EOSINOPHILS RELATIVE PERCENT: 2 %
EPITHELIAL CELLS, UA: NORMAL /HPF
GFR AFRICAN AMERICAN: >60
GFR NON-AFRICAN AMERICAN: >60
GLOBULIN: 2.6 G/DL (ref 2.3–3.5)
GLUCOSE BLD-MCNC: 100 MG/DL (ref 74–109)
GLUCOSE URINE: NEGATIVE MG/DL
HCT VFR BLD CALC: 44.9 % (ref 37–47)
HEMOGLOBIN: 15.4 G/DL (ref 12–16)
KETONES, URINE: NEGATIVE MG/DL
LEUKOCYTE ESTERASE, URINE: ABNORMAL
LYMPHOCYTES ABSOLUTE: 0.8 K/UL (ref 1–4.8)
LYMPHOCYTES RELATIVE PERCENT: 16.1 %
MAGNESIUM: 2.2 MG/DL (ref 1.7–2.3)
MCH RBC QN AUTO: 32 PG (ref 27–31.3)
MCHC RBC AUTO-ENTMCNC: 34.3 % (ref 33–37)
MCV RBC AUTO: 93.4 FL (ref 82–100)
MONOCYTES ABSOLUTE: 0.8 K/UL (ref 0.2–0.8)
MONOCYTES RELATIVE PERCENT: 16 %
NEUTROPHILS ABSOLUTE: 3 K/UL (ref 1.4–6.5)
NEUTROPHILS RELATIVE PERCENT: 63.3 %
NITRITE, URINE: NEGATIVE
PDW BLD-RTO: 13.4 % (ref 11.5–14.5)
PH UA: 5 (ref 5–9)
PLATELET # BLD: 173 K/UL (ref 130–400)
POTASSIUM SERPL-SCNC: 3.6 MEQ/L (ref 3.5–5.1)
PROTEIN UA: NEGATIVE MG/DL
RBC # BLD: 4.81 M/UL (ref 4.2–5.4)
RBC UA: NORMAL /HPF (ref 0–2)
SODIUM BLD-SCNC: 143 MEQ/L (ref 132–144)
SPECIFIC GRAVITY UA: 1.01 (ref 1–1.03)
TOTAL PROTEIN: 7 G/DL (ref 6.4–8.1)
URINE REFLEX TO CULTURE: YES
UROBILINOGEN, URINE: 0.2 E.U./DL
WBC # BLD: 4.8 K/UL (ref 4.8–10.8)
WBC UA: NORMAL /HPF (ref 0–5)

## 2018-12-20 PROCEDURE — 2580000003 HC RX 258: Performed by: EMERGENCY MEDICINE

## 2018-12-20 PROCEDURE — 70450 CT HEAD/BRAIN W/O DYE: CPT

## 2018-12-20 PROCEDURE — G0378 HOSPITAL OBSERVATION PER HR: HCPCS

## 2018-12-20 PROCEDURE — 83735 ASSAY OF MAGNESIUM: CPT

## 2018-12-20 PROCEDURE — 2580000003 HC RX 258: Performed by: INTERNAL MEDICINE

## 2018-12-20 PROCEDURE — 99285 EMERGENCY DEPT VISIT HI MDM: CPT

## 2018-12-20 PROCEDURE — 93005 ELECTROCARDIOGRAM TRACING: CPT

## 2018-12-20 PROCEDURE — 71045 X-RAY EXAM CHEST 1 VIEW: CPT

## 2018-12-20 PROCEDURE — 81001 URINALYSIS AUTO W/SCOPE: CPT

## 2018-12-20 PROCEDURE — 80053 COMPREHEN METABOLIC PANEL: CPT

## 2018-12-20 PROCEDURE — 87086 URINE CULTURE/COLONY COUNT: CPT

## 2018-12-20 PROCEDURE — 99220 PR INITIAL OBSERVATION CARE/DAY 70 MINUTES: CPT | Performed by: INTERNAL MEDICINE

## 2018-12-20 PROCEDURE — 93010 ELECTROCARDIOGRAM REPORT: CPT | Performed by: INTERNAL MEDICINE

## 2018-12-20 PROCEDURE — 36415 COLL VENOUS BLD VENIPUNCTURE: CPT

## 2018-12-20 PROCEDURE — 85025 COMPLETE CBC W/AUTO DIFF WBC: CPT

## 2018-12-20 RX ORDER — ONDANSETRON 2 MG/ML
4 INJECTION INTRAMUSCULAR; INTRAVENOUS EVERY 6 HOURS PRN
Status: DISCONTINUED | OUTPATIENT
Start: 2018-12-20 | End: 2018-12-20 | Stop reason: HOSPADM

## 2018-12-20 RX ORDER — SODIUM CHLORIDE 9 MG/ML
INJECTION, SOLUTION INTRAVENOUS CONTINUOUS
Status: DISCONTINUED | OUTPATIENT
Start: 2018-12-20 | End: 2018-12-20

## 2018-12-20 RX ORDER — SODIUM CHLORIDE 0.9 % (FLUSH) 0.9 %
3 SYRINGE (ML) INJECTION EVERY 8 HOURS
Status: DISCONTINUED | OUTPATIENT
Start: 2018-12-20 | End: 2018-12-20 | Stop reason: ALTCHOICE

## 2018-12-20 RX ORDER — AMLODIPINE BESYLATE 10 MG/1
10 TABLET ORAL DAILY
Status: DISCONTINUED | OUTPATIENT
Start: 2018-12-20 | End: 2018-12-20 | Stop reason: HOSPADM

## 2018-12-20 RX ORDER — METOPROLOL SUCCINATE 50 MG/1
50 TABLET, EXTENDED RELEASE ORAL 2 TIMES DAILY
Status: DISCONTINUED | OUTPATIENT
Start: 2018-12-20 | End: 2018-12-20 | Stop reason: HOSPADM

## 2018-12-20 RX ORDER — POTASSIUM CHLORIDE 20 MEQ/1
40 TABLET, EXTENDED RELEASE ORAL 2 TIMES DAILY WITH MEALS
Status: DISCONTINUED | OUTPATIENT
Start: 2018-12-20 | End: 2018-12-20 | Stop reason: HOSPADM

## 2018-12-20 RX ORDER — SODIUM CHLORIDE 0.9 % (FLUSH) 0.9 %
10 SYRINGE (ML) INJECTION EVERY 12 HOURS SCHEDULED
Status: DISCONTINUED | OUTPATIENT
Start: 2018-12-20 | End: 2018-12-20 | Stop reason: HOSPADM

## 2018-12-20 RX ORDER — SODIUM CHLORIDE 0.9 % (FLUSH) 0.9 %
10 SYRINGE (ML) INJECTION PRN
Status: DISCONTINUED | OUTPATIENT
Start: 2018-12-20 | End: 2018-12-20 | Stop reason: HOSPADM

## 2018-12-20 RX ORDER — 0.9 % SODIUM CHLORIDE 0.9 %
500 INTRAVENOUS SOLUTION INTRAVENOUS ONCE
Status: COMPLETED | OUTPATIENT
Start: 2018-12-20 | End: 2018-12-20

## 2018-12-20 RX ADMIN — SODIUM CHLORIDE: 9 INJECTION, SOLUTION INTRAVENOUS at 05:45

## 2018-12-20 RX ADMIN — SODIUM CHLORIDE 500 ML: 9 INJECTION, SOLUTION INTRAVENOUS at 03:03

## 2018-12-20 ASSESSMENT — PAIN SCALES - GENERAL: PAINLEVEL_OUTOF10: 0

## 2018-12-20 ASSESSMENT — ENCOUNTER SYMPTOMS
STRIDOR: 0
SHORTNESS OF BREATH: 0
WHEEZING: 0
BLOOD IN STOOL: 0
RESPIRATORY NEGATIVE: 1
COUGH: 0
NAUSEA: 0
EYES NEGATIVE: 1
CHEST TIGHTNESS: 0
GASTROINTESTINAL NEGATIVE: 1

## 2018-12-20 NOTE — FLOWSHEET NOTE
Pt wanting to leave AMA. Pt states she will follow up outpatient. Dr Donis Callahan talked to pt along with GREG Fortune/associate nurse manager. Pt still wants to leave. AMA paper signed. IV and tele monitor DC'd. Pt awaiting daughter to come pick her up.  Electronically signed by Hamlet López RN on 12/20/2018 at 8:35 AM

## 2018-12-20 NOTE — H&P
daily 12/4/18   Raj Gore,    apixaban (ELIQUIS) 5 MG TABS tablet Take 1 tablet by mouth 2 times daily 11/30/18   Raj SÁNCHEZ Holiday, DO   Cetirizine HCl (ZYRTEC ALLERGY) 10 MG CAPS Take 10 mg by mouth daily as needed     Historical Provider, MD   sertraline (ZOLOFT) 100 MG tablet TAKE 1 & 1/2 TABLETS BY MOUTH EVERY DAY  Patient taking differently: Take 100 mg by mouth daily TAKE 1 & 1/2 TABLETS BY MOUTH EVERY DAY 5/15/18   NICOLETTE Carrizales CNP   amLODIPine (NORVASC) 10 MG tablet TAKE ONE TABLET BY MOUTH EVERY DAY  Patient taking differently: Take 10 mg by mouth daily TAKE ONE TABLET BY MOUTH EVERY DAY 5/15/18   NICOLETTE Carrizales CNP   alendronate (FOSAMAX) 70 MG tablet take 1 tablet by mouth once every 7 days. Patient taking differently: Take 70 mg by mouth every 7 days take 1 tablet by mouth once every 7 days. 5/15/18   NICOLETTE Carrizales CNP   B COMPLEX-C ER PO Take by mouth daily    Historical Provider, MD   vitamin D3 (CHOLECALCIFEROL) 400 units TABS tablet Take 400 Units by mouth daily     Historical Provider, MD   Ascorbic Acid (VITAMIN C) 1000 MG tablet Take 1,000 mg by mouth daily     Historical Provider, MD   Cyanocobalamin (B-12 PO) Take 1,000 mg by mouth daily Pt said she splits a 2500mg dissolving tab in half so approximately 1200mg  Daily. Historical Provider, MD       Allergies   Allergen Reactions    Dust Mite Extract     Molds & Smuts        Family History   Problem Relation Age of Onset    Cancer Sister         colon, brain       Social History     Social History    Marital status:      Spouse name: N/A    Number of children: N/A    Years of education: N/A     Occupational History    Not on file.      Social History Main Topics    Smoking status: Never Smoker    Smokeless tobacco: Never Used    Alcohol use No    Drug use: No    Sexual activity: Not on file     Other Topics Concern    Not on file     Social History Narrative    No narrative on file Normocephalic, without obvious abnormality,   NECK:  Supple, symmetrical, trachea midline, no adenopathy, thyroid symmetric, not enlarged and no tenderness, skin normal  HEMATOLOGIC/LYMPHATICS:  no cervical lymphadenopathy and no supraclavicular lymphadenopathy  BACK:  Symmetric, no curvature  LUNGS:  No increased work of breathing, good air exchange, clear to auscultation bilaterally, no crackles or wheezing  CARDIOVASCULAR:  Normal apical impulse, regular rate and rhythm, normal S1 and S2, no S3 or S4, and no murmur noted  ABDOMEN:  No scars, normal bowel sounds, soft, non-distended, non-tender, no masses palpated, no hepatosplenomegally  CHEST: no masses palpated, no axillary or supraclavicular adenopathy  MUSCULOSKELETAL:  There is no redness, warmth, or swelling of the joints. Full range of motion noted. Motor strength is 5 out of 5 all extremities bilaterally. Tone is normal.  NEUROLOGIC:  Awake, alert, oriented to name, place and time. Cranial nerves II-XII are grossly intact. Motor is 5 out of 5 bilaterally. No sensory deficits.  No gait abnormality noted on physical exam.  SKIN:  no bruising or bleeding, normal skin color, texture     Labs:  Recent Results (from the past 24 hour(s))   Basic Metabolic Panel    Collection Time: 12/19/18  5:44 AM   Result Value Ref Range    Sodium 139 132 - 144 mEq/L    Potassium 4.0 3.5 - 5.1 mEq/L    Chloride 103 98 - 107 mEq/L    CO2 27 22 - 29 mEq/L    Anion Gap 9 7 - 13 mEq/L    Glucose 90 74 - 109 mg/dL    BUN 19 8 - 23 mg/dL    CREATININE 0.68 0.50 - 0.90 mg/dL    GFR Non-African American >60.0 >60    GFR  >60.0 >60    Calcium 8.8 8.6 - 10.2 mg/dL   Magnesium    Collection Time: 12/19/18  5:44 AM   Result Value Ref Range    Magnesium 2.4 (H) 1.7 - 2.3 mg/dL   CBC    Collection Time: 12/19/18  5:44 AM   Result Value Ref Range    WBC 4.7 (L) 4.8 - 10.8 K/uL    RBC 4.69 4.20 - 5.40 M/uL    Hemoglobin 15.2 12.0 - 16.0 g/dL    Hematocrit 44.0 37.0 - 47.0

## 2018-12-20 NOTE — PROGRESS NOTES
I haven't personally seen or examined patient. Patient left AMA in morning prior to being evaluated by me or Neurology. Information obtained from [facility] notes.     Reyes Gill MD  Pager : [phone]

## 2018-12-20 NOTE — TELEPHONE ENCOUNTER
Nella [facility] Transitions Initial Follow Up Call    Outreach made within 2 business days of discharge: Yes    Patient: Dalia Valles Patient : 1940   MRN: [id number]  Reason for Admission: There are no discharge diagnoses documented for the most recent discharge.   Discharge Date: 18       Carmel    Scheduled appointment with PCP within 7-14 days    Follow Up  Future Appointments  Date Time Provider Taylor Nolasco   1/3/2019 11:00 AM NICOLETTE Toro - CNP [facility] EMERGENCY [facility] AT [place]   2019 11:30 AM DO Carmen Jimenez [address]

## 2018-12-20 NOTE — ED PROVIDER NOTES
Emergency Department Physician who either signs or Co-signsthis chart in the absence of a cardiologist.        RADIOLOGY:   [place] [facility] such as CT, Ultrasound and MRI are read by the radiologist. Plain radiographic images are visualized and preliminarily interpreted by the emergency physician with the below findings:        Interpretation per the Radiologist below, if available at the time ofthis note:    CT Head WO Contrast    (Results Pending)   XR CHEST PORTABLE    (Results Pending)         ED BEDSIDE ULTRASOUND:   Performed by ED Physician - none    LABS:  Labs Reviewed   COMPREHENSIVE METABOLIC PANEL - Abnormal; Notable for the following:        Result Value    Anion Gap 15 (*)     BUN 25 (*)     All other components within normal limits   CBC WITH AUTO DIFFERENTIAL - Abnormal; Notable for the following:     MCH 32.0 (*)     Lymphocytes # 0.8 (*)     All other components within normal limits   URINE RT REFLEX TO CULTURE - Abnormal; Notable for the following:     Blood, Urine TRACE (*)     Leukocyte Esterase, Urine TRACE (*)     All other components within normal limits   URINE CULTURE   MAGNESIUM   MICROSCOPIC URINALYSIS       All other labs were within normal range or not returned as of this dictation. EMERGENCY DEPARTMENT COURSE and DIFFERENTIAL DIAGNOSIS/MDM:   Vitals:    Vitals:    12/20/18 0118 12/20/18 0252   BP: (!) 153/85 110/80   Pulse: 132 90   Resp: 18 18   Temp: 97.4 °F (36.3 °C)    TempSrc: Oral    SpO2: 96% 95%   Weight: 136 lb (61.7 kg)    Height: 5' 2\" (1.575 m)            MDM CT and other workup is negative. Dr. Esperanza Burton is contacted and advised he will consult in providing a substitute. There is a rapid response of atrial fibrillation but have not had to use other medications at this juncture. A contact hospitalist regarding admission      Patient still ataxic,    CRITICAL CARE TIME   Total Critical Care time was  minutes, excluding separately reportableprocedures.   There was a high probability of clinicallysignificant/life threatening deterioration in the patient's condition which required my urgent intervention. CONSULTS:  None    PROCEDURES:  Unless otherwise noted below, none     Procedures    FINAL IMPRESSION      1. Ataxia    2. Adverse effect of drug, initial encounter    3. Permanent atrial fibrillation [facility])          DISPOSITION/PLAN   DISPOSITION Decision To Admit 12/20/2018 03:07:00 AM      PATIENT REFERRED TO:  No follow-up provider specified.     DISCHARGE MEDICATIONS:  New Prescriptions    No medications on file          (Please note that portions of this note were completed with a voice recognition program.  Efforts were made to edit the dictations but occasionally words are mis-transcribed.)    Ad Orr MD (electronically signed)  Attending Emergency Physician          Ad Orr MD  12/20/18 3132

## 2018-12-21 LAB — URINE CULTURE, ROUTINE: NORMAL

## 2018-12-21 PROCEDURE — 93010 ELECTROCARDIOGRAM REPORT: CPT | Performed by: INTERNAL MEDICINE

## 2019-01-03 ENCOUNTER — OFFICE VISIT (OUTPATIENT)
Dept: FAMILY MEDICINE CLINIC | Age: 79
End: 2019-01-03
Payer: MEDICARE

## 2019-01-03 VITALS
TEMPERATURE: 97.4 F | HEART RATE: 87 BPM | HEIGHT: 62 IN | BODY MASS INDEX: 25.36 KG/M2 | SYSTOLIC BLOOD PRESSURE: 120 MMHG | OXYGEN SATURATION: 95 % | WEIGHT: 137.8 LBS | DIASTOLIC BLOOD PRESSURE: 82 MMHG

## 2019-01-03 DIAGNOSIS — I48.19 PERSISTENT ATRIAL FIBRILLATION (HCC): Primary | ICD-10-CM

## 2019-01-03 DIAGNOSIS — R42 DIZZY SPELLS: ICD-10-CM

## 2019-01-03 DIAGNOSIS — R27.0 ATAXIA: ICD-10-CM

## 2019-01-03 PROCEDURE — G8399 PT W/DXA RESULTS DOCUMENT: HCPCS | Performed by: NURSE PRACTITIONER

## 2019-01-03 PROCEDURE — G8419 CALC BMI OUT NRM PARAM NOF/U: HCPCS | Performed by: NURSE PRACTITIONER

## 2019-01-03 PROCEDURE — 99213 OFFICE O/P EST LOW 20 MIN: CPT | Performed by: NURSE PRACTITIONER

## 2019-01-03 PROCEDURE — G8484 FLU IMMUNIZE NO ADMIN: HCPCS | Performed by: NURSE PRACTITIONER

## 2019-01-03 PROCEDURE — 1101F PT FALLS ASSESS-DOCD LE1/YR: CPT | Performed by: NURSE PRACTITIONER

## 2019-01-03 PROCEDURE — 1111F DSCHRG MED/CURRENT MED MERGE: CPT | Performed by: NURSE PRACTITIONER

## 2019-01-03 PROCEDURE — 1123F ACP DISCUSS/DSCN MKR DOCD: CPT | Performed by: NURSE PRACTITIONER

## 2019-01-03 PROCEDURE — G8427 DOCREV CUR MEDS BY ELIG CLIN: HCPCS | Performed by: NURSE PRACTITIONER

## 2019-01-03 PROCEDURE — 1090F PRES/ABSN URINE INCON ASSESS: CPT | Performed by: NURSE PRACTITIONER

## 2019-01-03 PROCEDURE — 4040F PNEUMOC VAC/ADMIN/RCVD: CPT | Performed by: NURSE PRACTITIONER

## 2019-01-03 PROCEDURE — 1036F TOBACCO NON-USER: CPT | Performed by: NURSE PRACTITIONER

## 2019-01-03 RX ORDER — SOTALOL HYDROCHLORIDE 120 MG/1
120 TABLET ORAL 2 TIMES DAILY
COMMUNITY
End: 2019-04-06 | Stop reason: SDUPTHER

## 2019-01-03 ASSESSMENT — ENCOUNTER SYMPTOMS
WHEEZING: 0
RESPIRATORY NEGATIVE: 1
CHEST TIGHTNESS: 0
SHORTNESS OF BREATH: 0

## 2019-02-11 DIAGNOSIS — I10 ESSENTIAL HYPERTENSION: ICD-10-CM

## 2019-02-11 RX ORDER — METOPROLOL SUCCINATE 50 MG/1
50 TABLET, EXTENDED RELEASE ORAL DAILY
Qty: 180 TABLET | Refills: 1 | Status: SHIPPED | OUTPATIENT
Start: 2019-02-11 | End: 2019-07-23 | Stop reason: SDUPTHER

## 2019-02-11 RX ORDER — AMLODIPINE BESYLATE 10 MG/1
TABLET ORAL
Qty: 90 TABLET | Refills: 1 | Status: SHIPPED | OUTPATIENT
Start: 2019-02-11 | End: 2019-07-23 | Stop reason: SDUPTHER

## 2019-04-06 RX ORDER — SOTALOL HYDROCHLORIDE 120 MG/1
120 TABLET ORAL 2 TIMES DAILY
Qty: 60 TABLET | Refills: 5 | Status: SHIPPED | OUTPATIENT
Start: 2019-04-06 | End: 2019-07-23 | Stop reason: SDUPTHER

## 2019-06-17 ENCOUNTER — TELEPHONE (OUTPATIENT)
Dept: FAMILY MEDICINE CLINIC | Age: 79
End: 2019-06-17

## 2019-07-23 ENCOUNTER — OFFICE VISIT (OUTPATIENT)
Dept: FAMILY MEDICINE CLINIC | Age: 79
End: 2019-07-23
Payer: MEDICARE

## 2019-07-23 VITALS
HEART RATE: 83 BPM | SYSTOLIC BLOOD PRESSURE: 118 MMHG | HEIGHT: 62 IN | DIASTOLIC BLOOD PRESSURE: 78 MMHG | BODY MASS INDEX: 24.59 KG/M2 | OXYGEN SATURATION: 97 % | WEIGHT: 133.6 LBS | TEMPERATURE: 97.5 F

## 2019-07-23 DIAGNOSIS — R53.83 OTHER FATIGUE: ICD-10-CM

## 2019-07-23 DIAGNOSIS — I10 ESSENTIAL HYPERTENSION: ICD-10-CM

## 2019-07-23 DIAGNOSIS — F41.9 ANXIETY: ICD-10-CM

## 2019-07-23 DIAGNOSIS — I48.19 PERSISTENT ATRIAL FIBRILLATION (HCC): Primary | ICD-10-CM

## 2019-07-23 DIAGNOSIS — I48.19 PERSISTENT ATRIAL FIBRILLATION (HCC): ICD-10-CM

## 2019-07-23 LAB
ALBUMIN SERPL-MCNC: 4.7 G/DL (ref 3.5–4.6)
ALP BLD-CCNC: 64 U/L (ref 40–130)
ALT SERPL-CCNC: 25 U/L (ref 0–33)
ANION GAP SERPL CALCULATED.3IONS-SCNC: 12 MEQ/L (ref 9–15)
AST SERPL-CCNC: 26 U/L (ref 0–35)
BASOPHILS ABSOLUTE: 0.2 K/UL (ref 0–0.2)
BASOPHILS RELATIVE PERCENT: 2.9 %
BILIRUB SERPL-MCNC: 0.6 MG/DL (ref 0.2–0.7)
BUN BLDV-MCNC: 16 MG/DL (ref 8–23)
CALCIUM SERPL-MCNC: 9.9 MG/DL (ref 8.5–9.9)
CHLORIDE BLD-SCNC: 102 MEQ/L (ref 95–107)
CO2: 27 MEQ/L (ref 20–31)
CREAT SERPL-MCNC: 0.73 MG/DL (ref 0.5–0.9)
EOSINOPHILS ABSOLUTE: 0.1 K/UL (ref 0–0.7)
EOSINOPHILS RELATIVE PERCENT: 2 %
GFR AFRICAN AMERICAN: >60
GFR NON-AFRICAN AMERICAN: >60
GLOBULIN: 3.1 G/DL (ref 2.3–3.5)
GLUCOSE BLD-MCNC: 94 MG/DL (ref 70–99)
HCT VFR BLD CALC: 44.2 % (ref 37–47)
HEMOGLOBIN: 15.4 G/DL (ref 12–16)
LYMPHOCYTES ABSOLUTE: 0.7 K/UL (ref 1–4.8)
LYMPHOCYTES RELATIVE PERCENT: 14.2 %
MCH RBC QN AUTO: 32.5 PG (ref 27–31.3)
MCHC RBC AUTO-ENTMCNC: 34.8 % (ref 33–37)
MCV RBC AUTO: 93.4 FL (ref 82–100)
MONOCYTES ABSOLUTE: 0.6 K/UL (ref 0.2–0.8)
MONOCYTES RELATIVE PERCENT: 11.8 %
NEUTROPHILS ABSOLUTE: 3.5 K/UL (ref 1.4–6.5)
NEUTROPHILS RELATIVE PERCENT: 69.1 %
PDW BLD-RTO: 13.5 % (ref 11.5–14.5)
PLATELET # BLD: 203 K/UL (ref 130–400)
POTASSIUM SERPL-SCNC: 4.5 MEQ/L (ref 3.4–4.9)
RBC # BLD: 4.73 M/UL (ref 4.2–5.4)
SODIUM BLD-SCNC: 141 MEQ/L (ref 135–144)
TOTAL PROTEIN: 7.8 G/DL (ref 6.3–8)
TSH REFLEX: 3.17 UIU/ML (ref 0.44–3.86)
WBC # BLD: 5.1 K/UL (ref 4.8–10.8)

## 2019-07-23 PROCEDURE — G8420 CALC BMI NORM PARAMETERS: HCPCS | Performed by: NURSE PRACTITIONER

## 2019-07-23 PROCEDURE — G8399 PT W/DXA RESULTS DOCUMENT: HCPCS | Performed by: NURSE PRACTITIONER

## 2019-07-23 PROCEDURE — 1123F ACP DISCUSS/DSCN MKR DOCD: CPT | Performed by: NURSE PRACTITIONER

## 2019-07-23 PROCEDURE — 99214 OFFICE O/P EST MOD 30 MIN: CPT | Performed by: NURSE PRACTITIONER

## 2019-07-23 PROCEDURE — 4040F PNEUMOC VAC/ADMIN/RCVD: CPT | Performed by: NURSE PRACTITIONER

## 2019-07-23 PROCEDURE — 1090F PRES/ABSN URINE INCON ASSESS: CPT | Performed by: NURSE PRACTITIONER

## 2019-07-23 PROCEDURE — 1036F TOBACCO NON-USER: CPT | Performed by: NURSE PRACTITIONER

## 2019-07-23 PROCEDURE — G8427 DOCREV CUR MEDS BY ELIG CLIN: HCPCS | Performed by: NURSE PRACTITIONER

## 2019-07-23 RX ORDER — METOPROLOL SUCCINATE 50 MG/1
50 TABLET, EXTENDED RELEASE ORAL DAILY
Qty: 180 TABLET | Refills: 1 | Status: SHIPPED | OUTPATIENT
Start: 2019-07-23 | End: 2019-10-01 | Stop reason: SDUPTHER

## 2019-07-23 RX ORDER — SOTALOL HYDROCHLORIDE 120 MG/1
120 TABLET ORAL 2 TIMES DAILY
Qty: 60 TABLET | Refills: 5 | Status: SHIPPED | OUTPATIENT
Start: 2019-07-23 | End: 2019-10-18

## 2019-07-23 RX ORDER — AMLODIPINE BESYLATE 10 MG/1
TABLET ORAL
Qty: 90 TABLET | Refills: 1 | Status: SHIPPED | OUTPATIENT
Start: 2019-07-23 | End: 2020-02-02

## 2019-07-23 RX ORDER — SERTRALINE HYDROCHLORIDE 100 MG/1
100 TABLET, FILM COATED ORAL DAILY
Qty: 30 TABLET | Refills: 5 | Status: SHIPPED | OUTPATIENT
Start: 2019-07-23 | End: 2019-10-01 | Stop reason: SDUPTHER

## 2019-07-23 ASSESSMENT — PATIENT HEALTH QUESTIONNAIRE - PHQ9
SUM OF ALL RESPONSES TO PHQ9 QUESTIONS 1 & 2: 1
SUM OF ALL RESPONSES TO PHQ QUESTIONS 1-9: 1
SUM OF ALL RESPONSES TO PHQ QUESTIONS 1-9: 1
2. FEELING DOWN, DEPRESSED OR HOPELESS: 1
1. LITTLE INTEREST OR PLEASURE IN DOING THINGS: 0

## 2019-07-23 ASSESSMENT — ENCOUNTER SYMPTOMS
COUGH: 0
SHORTNESS OF BREATH: 0

## 2019-07-23 NOTE — PROGRESS NOTES
 Comprehensive Metabolic Panel     Standing Status:   Future     Standing Expiration Date:   7/23/2020    CBC Auto Differential     Standing Status:   Future     Standing Expiration Date:   7/23/2020       Orders Placed This Encounter   Medications    amLODIPine (NORVASC) 10 MG tablet     Sig: TAKE ONE TABLET BY MOUTH EVERY DAY     Dispense:  90 tablet     Refill:  1    apixaban (ELIQUIS) 5 MG TABS tablet     Sig: Take 1 tablet by mouth 2 times daily     Dispense:  180 tablet     Refill:  1    metoprolol succinate (TOPROL XL) 50 MG extended release tablet     Sig: Take 1 tablet by mouth daily     Dispense:  180 tablet     Refill:  1    sertraline (ZOLOFT) 100 MG tablet     Sig: Take 1 tablet by mouth daily     Dispense:  30 tablet     Refill:  5    sotalol (BETAPACE) 120 MG tablet     Sig: Take 1 tablet by mouth 2 times daily     Dispense:  60 tablet     Refill:  5       Side effects, adverse effects of the medication prescribed today, as well as treatment plan/ rationale and result expectations have been discussed with the patient who expresses understanding and desires to proceed. Close follow up to evaluate treatment results and for coordination of care. I have reviewed the patient's medical history in detail and updated the computerized patient record. As always, patient is advised that if symptoms worsen in any way they will proceed to the nearest emergency room. Fu 6 mos. Pt given cardiology phone number to follow up with appt. She states that she will do this.      Dimitrios Barnard, NICOLETTE - CNP

## 2019-09-23 ENCOUNTER — APPOINTMENT (OUTPATIENT)
Dept: CT IMAGING | Age: 79
End: 2019-09-23
Payer: MEDICARE

## 2019-09-23 ENCOUNTER — APPOINTMENT (OUTPATIENT)
Dept: GENERAL RADIOLOGY | Age: 79
End: 2019-09-23
Payer: MEDICARE

## 2019-09-23 ENCOUNTER — HOSPITAL ENCOUNTER (EMERGENCY)
Age: 79
Discharge: HOME | End: 2019-09-23
Attending: STUDENT IN AN ORGANIZED HEALTH CARE EDUCATION/TRAINING PROGRAM
Payer: MEDICARE

## 2019-09-23 ENCOUNTER — TELEPHONE (OUTPATIENT)
Dept: FAMILY MEDICINE CLINIC | Age: 79
End: 2019-09-23

## 2019-09-23 VITALS
DIASTOLIC BLOOD PRESSURE: 88 MMHG | OXYGEN SATURATION: 96 % | TEMPERATURE: 97.5 F | RESPIRATION RATE: 16 BRPM | SYSTOLIC BLOOD PRESSURE: 136 MMHG | HEART RATE: 92 BPM | BODY MASS INDEX: 24.66 KG/M2 | HEIGHT: 62 IN | WEIGHT: 134 LBS

## 2019-09-23 DIAGNOSIS — Z79.01 CHRONIC ANTICOAGULATION: ICD-10-CM

## 2019-09-23 DIAGNOSIS — S80.211A ABRASION OF RIGHT KNEE, INITIAL ENCOUNTER: ICD-10-CM

## 2019-09-23 DIAGNOSIS — S00.83XA FACIAL CONTUSION, INITIAL ENCOUNTER: Primary | ICD-10-CM

## 2019-09-23 DIAGNOSIS — S00.03XA SCALP HEMATOMA, INITIAL ENCOUNTER: ICD-10-CM
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
## 2019-09-23 LAB
ALBUMIN SERPL-MCNC: 4.7 G/DL (ref 3.5–4.6)
ALP BLD-CCNC: 81 U/L (ref 40–130)
ALT SERPL-CCNC: 25 U/L (ref 0–33)
ANION GAP SERPL CALCULATED.3IONS-SCNC: 11 MEQ/L (ref 9–15)
APTT: 32.8 SEC (ref 24.4–36.8)
AST SERPL-CCNC: 26 U/L (ref 0–35)
BASOPHILS ABSOLUTE: 0 K/UL (ref 0–0.2)
BASOPHILS RELATIVE PERCENT: 0.7 %
BILIRUB SERPL-MCNC: 0.5 MG/DL (ref 0.2–0.7)
BUN BLDV-MCNC: 17 MG/DL (ref 8–23)
CALCIUM SERPL-MCNC: 9.5 MG/DL (ref 8.5–9.9)
CHLORIDE BLD-SCNC: 101 MEQ/L (ref 95–107)
CO2: 29 MEQ/L (ref 20–31)
CREAT SERPL-MCNC: 0.69 MG/DL (ref 0.5–0.9)
EOSINOPHILS ABSOLUTE: 0.1 K/UL (ref 0–0.7)
EOSINOPHILS RELATIVE PERCENT: 2.2 %
GFR AFRICAN AMERICAN: >60
GFR NON-AFRICAN AMERICAN: >60
GLOBULIN: 3.4 G/DL (ref 2.3–3.5)
GLUCOSE BLD-MCNC: 102 MG/DL (ref 70–99)
HCT VFR BLD CALC: 45.3 % (ref 37–47)
HEMOGLOBIN: 15.3 G/DL (ref 12–16)
INR BLD: 1
LYMPHOCYTES ABSOLUTE: 0.9 K/UL (ref 1–4.8)
LYMPHOCYTES RELATIVE PERCENT: 15.6 %
MCH RBC QN AUTO: 32 PG (ref 27–31.3)
MCHC RBC AUTO-ENTMCNC: 33.7 % (ref 33–37)
MCV RBC AUTO: 95.1 FL (ref 82–100)
MONOCYTES ABSOLUTE: 0.7 K/UL (ref 0.2–0.8)
MONOCYTES RELATIVE PERCENT: 12.6 %
NEUTROPHILS ABSOLUTE: 3.9 K/UL (ref 1.4–6.5)
NEUTROPHILS RELATIVE PERCENT: 68.9 %
PDW BLD-RTO: 13.8 % (ref 11.5–14.5)
PLATELET # BLD: 210 K/UL (ref 130–400)
POTASSIUM SERPL-SCNC: 4.2 MEQ/L (ref 3.4–4.9)
PROTHROMBIN TIME: 13.4 SEC (ref 12.3–14.9)
RBC # BLD: 4.76 M/UL (ref 4.2–5.4)
SODIUM BLD-SCNC: 141 MEQ/L (ref 135–144)
TOTAL PROTEIN: 8.1 G/DL (ref 6.3–8)
WBC # BLD: 5.7 K/UL (ref 4.8–10.8)

## 2019-09-23 PROCEDURE — 85730 THROMBOPLASTIN TIME PARTIAL: CPT

## 2019-09-23 PROCEDURE — 72125 CT NECK SPINE W/O DYE: CPT

## 2019-09-23 PROCEDURE — 70486 CT MAXILLOFACIAL W/O DYE: CPT

## 2019-09-23 PROCEDURE — 85025 COMPLETE CBC W/AUTO DIFF WBC: CPT

## 2019-09-23 PROCEDURE — 70450 CT HEAD/BRAIN W/O DYE: CPT

## 2019-09-23 PROCEDURE — 73564 X-RAY EXAM KNEE 4 OR MORE: CPT

## 2019-09-23 PROCEDURE — 80053 COMPREHEN METABOLIC PANEL: CPT

## 2019-09-23 PROCEDURE — 99284 EMERGENCY DEPT VISIT MOD MDM: CPT

## 2019-09-23 PROCEDURE — 85610 PROTHROMBIN TIME: CPT

## 2019-09-23 PROCEDURE — 36415 COLL VENOUS BLD VENIPUNCTURE: CPT

## 2019-09-23 ASSESSMENT — PAIN DESCRIPTION - DESCRIPTORS: DESCRIPTORS: NUMBNESS

## 2019-09-23 ASSESSMENT — ENCOUNTER SYMPTOMS
COUGH: 0
SINUS PRESSURE: 0
TROUBLE SWALLOWING: 0
CHEST TIGHTNESS: 0
ABDOMINAL PAIN: 0
VOMITING: 0
DIARRHEA: 0
BACK PAIN: 0
SHORTNESS OF BREATH: 0

## 2019-09-23 ASSESSMENT — PAIN DESCRIPTION - LOCATION: LOCATION: HEAD

## 2019-09-23 ASSESSMENT — PAIN SCALES - GENERAL: PAINLEVEL_OUTOF10: 2

## 2019-09-23 ASSESSMENT — PAIN DESCRIPTION - PAIN TYPE: TYPE: ACUTE PAIN

## 2019-09-23 NOTE — ED PROVIDER NOTES
[facility] ED  eMERGENCY dEPARTMENT eNCOUnter      Pt Name: Flavio Souza  MRN: [id number]  [place] 1940  Date of evaluation: 9/23/2019  Provider: Deandre Gutierrez DO    CHIEF COMPLAINT       Chief Complaint   Patient presents with    Fall     pt tripped and fell on concrete landing on face and right knee approx 1220 today. pt on eliquis. No LOC         HISTORY OF PRESENT ILLNESS   (Location/Symptom, Timing/Onset,Context/Setting, Quality, Duration, Modifying Factors, Severity)  Note limiting factors. Flavio Souza is a 70 y.o. female who presents to the emergency department plate that she tripped and fell after 1 of the wood steps came loose at her home. Patient hit her face on the concrete. No loss of consciousness. Patient denies any visual changes. Patient also struck her right knee. Patient states that she only skinned her knee and it does not hurt that bad. Patient denies any nausea or vomiting. Patient has a hematoma to the left forehead. Patient has a contusion and abrasion across her nasal bridge. Patient denies any symptoms making her better or worse. Patient denies feeling dizzy or passing out. HPI    NursingNotes were reviewed. REVIEW OF SYSTEMS    (2-9 systems for level 4, 10 or more for level 5)     Review of Systems   Constitutional: Negative for activity change, appetite change, chills, fever and unexpected weight change. HENT: Negative for drooling, ear pain, nosebleeds, sinus pressure and trouble swallowing. Respiratory: Negative for cough, chest tightness and shortness of breath. Cardiovascular: Negative for chest pain and leg swelling. Gastrointestinal: Negative for abdominal pain, diarrhea and vomiting. Endocrine: Negative for polydipsia and polyphagia. Genitourinary: Negative for dysuria, flank pain and frequency. Musculoskeletal: Negative for back pain and myalgias. Skin: Negative for pallor and rash.    Neurological: Negative for syncope, weakness and headaches. Hematological: Does not bruise/bleed easily. All other systems reviewed and are negative. Except as noted above the remainder of the review of systems was reviewed and negative. PAST MEDICAL HISTORY     Past Medical History:   Diagnosis Date    Allergic rhinitis     Anxiety     Atrial fibrillation (HCC)     Chronic kidney disease     Epistaxis 3/14/2017    Headache     Hypertension     Hypothyroidism     Inflamed seborrheic keratosis          SURGICALHISTORY       Past Surgical History:   Procedure Laterality Date    HYSTERECTOMY           CURRENT MEDICATIONS       Previous Medications    ALENDRONATE (FOSAMAX) 70 MG TABLET    take 1 tablet by mouth once every 7 days. AMLODIPINE (NORVASC) 10 MG TABLET    TAKE ONE TABLET BY MOUTH EVERY DAY    APIXABAN (ELIQUIS) 5 MG TABS TABLET    Take 1 tablet by mouth 2 times daily    ASCORBIC ACID (VITAMIN C) 1000 MG TABLET    Take 1,000 mg by mouth daily     B COMPLEX-C ER PO    Take by mouth daily    CETIRIZINE HCL (ZYRTEC ALLERGY) 10 MG CAPS    Take 10 mg by mouth daily as needed     CYANOCOBALAMIN (B-12 PO)    Take 1,000 mg by mouth daily Pt said she splits a 2500mg dissolving tab in half so approximately 1200mg  Daily.      MAGNESIUM OXIDE 250 MG TABS    Take by mouth daily    METOPROLOL SUCCINATE (TOPROL XL) 50 MG EXTENDED RELEASE TABLET    Take 1 tablet by mouth daily    SERTRALINE (ZOLOFT) 100 MG TABLET    Take 1 tablet by mouth daily    SOTALOL (BETAPACE) 120 MG TABLET    Take 1 tablet by mouth 2 times daily    VITAMIN D3 (CHOLECALCIFEROL) 400 UNITS TABS TABLET    Take 400 Units by mouth daily        ALLERGIES     Dofetilide; Dust mite extract; and Molds & smuts    FAMILY HISTORY       Family History   Problem Relation Age of Onset    Cancer Sister         colon, brain          SOCIAL HISTORY       Social History     Socioeconomic History    Marital status:      Spouse name: None    Number of children: None    the Radiologist below, if available at the time ofthis note:    CT CERVICAL SPINE WO CONTRAST   Final Result   FINAL IMPRESSION:      FOREHEAD SOFT TISSUE SWELLING/SCALP HEMATOMA. NO ACUTE INTRACRANIAL PROCESS, FRACTURE, OR EVIDENCE OF CERVICAL SPINE INJURY IDENTIFIED. CT FACIAL BONES WO CONTRAST   Final Result   FINAL IMPRESSION:      FOREHEAD SOFT TISSUE SWELLING/SCALP HEMATOMA. NO ACUTE INTRACRANIAL PROCESS, FRACTURE, OR EVIDENCE OF CERVICAL SPINE INJURY IDENTIFIED. CT HEAD WO CONTRAST   Final Result   FINAL IMPRESSION:      FOREHEAD SOFT TISSUE SWELLING/SCALP HEMATOMA. NO ACUTE INTRACRANIAL PROCESS, FRACTURE, OR EVIDENCE OF CERVICAL SPINE INJURY IDENTIFIED. XR KNEE RIGHT (MIN 4 VIEWS)    (Results Pending)     Xray right knee:  No fx, no dislocation. ED BEDSIDE ULTRASOUND:   Performed by ED Physician - none    LABS:  Labs Reviewed   CBC WITH AUTO DIFFERENTIAL - Abnormal; Notable for the following components:       Result Value    MCH 32.0 (*)     Lymphocytes Absolute 0.9 (*)     All other components within normal limits   COMPREHENSIVE METABOLIC PANEL - Abnormal; Notable for the following components:    Glucose 102 (*)     Total Protein 8.1 (*)     Alb 4.7 (*)     All other components within normal limits   PROTIME-INR   APTT       All other labs were within normal range or not returned as of this dictation. EMERGENCY DEPARTMENT COURSE and DIFFERENTIAL DIAGNOSIS/MDM:   Vitals:    Vitals:    09/23/19 1300 09/23/19 1301 09/23/19 1430   BP: (!) 144/84  136/88   Pulse: 95  92   Resp: 19  16   Temp: 97.5 °F (36.4 °C)     TempSrc: Oral     SpO2:  95% 96%   Weight:  134 lb (60.8 kg)    Height:  5' 2\" (1.575 m)            MDM  The ED attending spoke with Shanda Knox with the trauma service. Trauma does not feel that the patient should be admitted.   He can be discharged with follow-up with PCP and they are concerned about chronic anticoagulation and

## 2019-09-23 NOTE — TELEPHONE ENCOUNTER
Pt called to let us know that she fell on her cement porch, and hit her forehead. Pt states that she has a good sized trent on her forehead. Pt asked if she could come here to be evaluated. I advised the pt to go to ER. She agreed to go.

## 2019-10-01 ENCOUNTER — OFFICE VISIT (OUTPATIENT)
Dept: FAMILY MEDICINE CLINIC | Age: 79
End: 2019-10-01
Payer: MEDICARE

## 2019-10-01 VITALS
BODY MASS INDEX: 25.03 KG/M2 | DIASTOLIC BLOOD PRESSURE: 74 MMHG | WEIGHT: 136 LBS | HEART RATE: 77 BPM | HEIGHT: 62 IN | SYSTOLIC BLOOD PRESSURE: 118 MMHG | OXYGEN SATURATION: 97 % | TEMPERATURE: 98.1 F

## 2019-10-01 DIAGNOSIS — I10 ESSENTIAL HYPERTENSION: ICD-10-CM

## 2019-10-01 DIAGNOSIS — F41.9 ANXIETY: ICD-10-CM

## 2019-10-01 DIAGNOSIS — Z91.81 AT HIGH RISK FOR FALLS: ICD-10-CM

## 2019-10-01 DIAGNOSIS — I48.19 PERSISTENT ATRIAL FIBRILLATION (HCC): ICD-10-CM
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
## 2019-10-01 DIAGNOSIS — S00.03XD HEMATOMA OF SCALP, SUBSEQUENT ENCOUNTER: ICD-10-CM

## 2019-10-01 PROCEDURE — G8484 FLU IMMUNIZE NO ADMIN: HCPCS | Performed by: NURSE PRACTITIONER

## 2019-10-01 PROCEDURE — 1123F ACP DISCUSS/DSCN MKR DOCD: CPT | Performed by: NURSE PRACTITIONER

## 2019-10-01 PROCEDURE — 4040F PNEUMOC VAC/ADMIN/RCVD: CPT | Performed by: NURSE PRACTITIONER

## 2019-10-01 PROCEDURE — G8427 DOCREV CUR MEDS BY ELIG CLIN: HCPCS | Performed by: NURSE PRACTITIONER

## 2019-10-01 PROCEDURE — G8399 PT W/DXA RESULTS DOCUMENT: HCPCS | Performed by: NURSE PRACTITIONER

## 2019-10-01 PROCEDURE — 99213 OFFICE O/P EST LOW 20 MIN: CPT | Performed by: NURSE PRACTITIONER

## 2019-10-01 PROCEDURE — 1090F PRES/ABSN URINE INCON ASSESS: CPT | Performed by: NURSE PRACTITIONER

## 2019-10-01 PROCEDURE — G8420 CALC BMI NORM PARAMETERS: HCPCS | Performed by: NURSE PRACTITIONER

## 2019-10-01 PROCEDURE — 1036F TOBACCO NON-USER: CPT | Performed by: NURSE PRACTITIONER

## 2019-10-01 RX ORDER — METOPROLOL SUCCINATE 50 MG/1
50 TABLET, EXTENDED RELEASE ORAL DAILY
Qty: 180 TABLET | Refills: 1 | Status: SHIPPED | OUTPATIENT
Start: 2019-10-01 | End: 2020-07-12

## 2019-10-01 RX ORDER — SERTRALINE HYDROCHLORIDE 100 MG/1
100 TABLET, FILM COATED ORAL DAILY
Qty: 90 TABLET | Refills: 1 | Status: SHIPPED | OUTPATIENT
Start: 2019-10-01 | End: 2020-04-20

## 2019-10-01 ASSESSMENT — ENCOUNTER SYMPTOMS
CHOKING: 0
SHORTNESS OF BREATH: 0
CHEST TIGHTNESS: 0
NAUSEA: 0

## 2019-10-18 ENCOUNTER — OFFICE VISIT (OUTPATIENT)
Dept: CARDIOLOGY CLINIC | Age: 79
End: 2019-10-18
Payer: MEDICARE

## 2019-10-18 VITALS
BODY MASS INDEX: 25.03 KG/M2 | HEIGHT: 62 IN | WEIGHT: 136 LBS | DIASTOLIC BLOOD PRESSURE: 80 MMHG | HEART RATE: 80 BPM | SYSTOLIC BLOOD PRESSURE: 110 MMHG

## 2019-10-18 DIAGNOSIS — I48.19 PERSISTENT ATRIAL FIBRILLATION (HCC): Primary | ICD-10-CM

## 2019-10-18 PROCEDURE — G8420 CALC BMI NORM PARAMETERS: HCPCS | Performed by: INTERNAL MEDICINE

## 2019-10-18 PROCEDURE — 1090F PRES/ABSN URINE INCON ASSESS: CPT | Performed by: INTERNAL MEDICINE

## 2019-10-18 PROCEDURE — 1036F TOBACCO NON-USER: CPT | Performed by: INTERNAL MEDICINE

## 2019-10-18 PROCEDURE — 4040F PNEUMOC VAC/ADMIN/RCVD: CPT | Performed by: INTERNAL MEDICINE

## 2019-10-18 PROCEDURE — 93000 ELECTROCARDIOGRAM COMPLETE: CPT | Performed by: INTERNAL MEDICINE

## 2019-10-18 PROCEDURE — G8399 PT W/DXA RESULTS DOCUMENT: HCPCS | Performed by: INTERNAL MEDICINE

## 2019-10-18 PROCEDURE — G8427 DOCREV CUR MEDS BY ELIG CLIN: HCPCS | Performed by: INTERNAL MEDICINE

## 2019-10-18 PROCEDURE — 99214 OFFICE O/P EST MOD 30 MIN: CPT | Performed by: INTERNAL MEDICINE

## 2019-10-18 PROCEDURE — G8484 FLU IMMUNIZE NO ADMIN: HCPCS | Performed by: INTERNAL MEDICINE

## 2019-10-18 PROCEDURE — 1123F ACP DISCUSS/DSCN MKR DOCD: CPT | Performed by: INTERNAL MEDICINE

## 2019-11-20 ENCOUNTER — TELEPHONE (OUTPATIENT)
Dept: FAMILY MEDICINE CLINIC | Age: 79
End: 2019-11-20

## 2019-12-03 ENCOUNTER — OFFICE VISIT (OUTPATIENT)
Dept: FAMILY MEDICINE CLINIC | Age: 79
End: 2019-12-03
Payer: MEDICARE

## 2019-12-03 VITALS
RESPIRATION RATE: 14 BRPM | HEART RATE: 64 BPM | DIASTOLIC BLOOD PRESSURE: 80 MMHG | HEIGHT: 62 IN | SYSTOLIC BLOOD PRESSURE: 114 MMHG | BODY MASS INDEX: 26.87 KG/M2 | OXYGEN SATURATION: 95 % | TEMPERATURE: 96.8 F | WEIGHT: 146 LBS

## 2019-12-03 DIAGNOSIS — Z00.00 ROUTINE GENERAL MEDICAL EXAMINATION AT A HEALTH CARE FACILITY: Primary | ICD-10-CM

## 2019-12-03 PROCEDURE — G0438 PPPS, INITIAL VISIT: HCPCS | Performed by: NURSE PRACTITIONER

## 2019-12-03 PROCEDURE — 1123F ACP DISCUSS/DSCN MKR DOCD: CPT | Performed by: NURSE PRACTITIONER

## 2019-12-03 PROCEDURE — 4040F PNEUMOC VAC/ADMIN/RCVD: CPT | Performed by: NURSE PRACTITIONER

## 2019-12-03 PROCEDURE — G8484 FLU IMMUNIZE NO ADMIN: HCPCS | Performed by: NURSE PRACTITIONER

## 2019-12-03 RX ORDER — SODIUM FLUORIDE 6 MG/ML
PASTE, DENTIFRICE DENTAL
Refills: 5 | COMMUNITY
Start: 2019-11-04

## 2019-12-03 RX ORDER — SOTALOL HYDROCHLORIDE 120 MG/1
TABLET ORAL
COMMUNITY
Start: 2019-10-22 | End: 2020-06-16 | Stop reason: CLARIF

## 2019-12-03 ASSESSMENT — PATIENT HEALTH QUESTIONNAIRE - PHQ9
SUM OF ALL RESPONSES TO PHQ QUESTIONS 1-9: 0
2. FEELING DOWN, DEPRESSED OR HOPELESS: 0
SUM OF ALL RESPONSES TO PHQ QUESTIONS 1-9: 0
1. LITTLE INTEREST OR PLEASURE IN DOING THINGS: 0
SUM OF ALL RESPONSES TO PHQ9 QUESTIONS 1 & 2: 0

## 2020-01-20 RX ORDER — APIXABAN 5 MG/1
TABLET, FILM COATED ORAL
Qty: 180 TABLET | Refills: 1 | Status: SHIPPED | OUTPATIENT
Start: 2020-01-20 | End: 2020-06-01

## 2020-02-02 RX ORDER — AMLODIPINE BESYLATE 10 MG/1
TABLET ORAL
Qty: 90 TABLET | Refills: 1 | Status: SHIPPED | OUTPATIENT
Start: 2020-02-02 | End: 2020-06-17

## 2020-04-20 RX ORDER — SERTRALINE HYDROCHLORIDE 100 MG/1
TABLET, FILM COATED ORAL
Qty: 90 TABLET | Refills: 1 | Status: SHIPPED | OUTPATIENT
Start: 2020-04-20 | End: 2020-08-27

## 2020-06-01 RX ORDER — APIXABAN 5 MG/1
TABLET, FILM COATED ORAL
Qty: 180 TABLET | Refills: 1 | Status: SHIPPED | OUTPATIENT
Start: 2020-06-01 | End: 2021-03-26 | Stop reason: SDUPTHER

## 2020-06-16 ENCOUNTER — OFFICE VISIT (OUTPATIENT)
Dept: FAMILY MEDICINE CLINIC | Age: 80
End: 2020-06-16
Payer: MEDICARE

## 2020-06-16 VITALS
HEART RATE: 111 BPM | SYSTOLIC BLOOD PRESSURE: 116 MMHG | TEMPERATURE: 97.3 F | DIASTOLIC BLOOD PRESSURE: 60 MMHG | WEIGHT: 134.4 LBS | OXYGEN SATURATION: 98 % | HEIGHT: 62 IN | BODY MASS INDEX: 24.73 KG/M2

## 2020-06-16 DIAGNOSIS — I10 ESSENTIAL HYPERTENSION: ICD-10-CM

## 2020-06-16 LAB
ALBUMIN SERPL-MCNC: 4.4 G/DL (ref 3.5–4.6)
ALP BLD-CCNC: 67 U/L (ref 40–130)
ALT SERPL-CCNC: 18 U/L (ref 0–33)
ANION GAP SERPL CALCULATED.3IONS-SCNC: 12 MEQ/L (ref 9–15)
AST SERPL-CCNC: 20 U/L (ref 0–35)
BILIRUB SERPL-MCNC: 0.6 MG/DL (ref 0.2–0.7)
BUN BLDV-MCNC: 14 MG/DL (ref 8–23)
CALCIUM SERPL-MCNC: 9.6 MG/DL (ref 8.5–9.9)
CHLORIDE BLD-SCNC: 105 MEQ/L (ref 95–107)
CO2: 26 MEQ/L (ref 20–31)
CREAT SERPL-MCNC: 0.72 MG/DL (ref 0.5–0.9)
GFR AFRICAN AMERICAN: >60
GFR NON-AFRICAN AMERICAN: >60
GLOBULIN: 3.1 G/DL (ref 2.3–3.5)
GLUCOSE BLD-MCNC: 88 MG/DL (ref 70–99)
POTASSIUM SERPL-SCNC: 4.3 MEQ/L (ref 3.4–4.9)
SODIUM BLD-SCNC: 143 MEQ/L (ref 135–144)
TOTAL PROTEIN: 7.5 G/DL (ref 6.3–8)

## 2020-06-16 PROCEDURE — G8420 CALC BMI NORM PARAMETERS: HCPCS | Performed by: NURSE PRACTITIONER

## 2020-06-16 PROCEDURE — 1090F PRES/ABSN URINE INCON ASSESS: CPT | Performed by: NURSE PRACTITIONER

## 2020-06-16 PROCEDURE — 99214 OFFICE O/P EST MOD 30 MIN: CPT | Performed by: NURSE PRACTITIONER

## 2020-06-16 PROCEDURE — 1123F ACP DISCUSS/DSCN MKR DOCD: CPT | Performed by: NURSE PRACTITIONER

## 2020-06-16 PROCEDURE — 4040F PNEUMOC VAC/ADMIN/RCVD: CPT | Performed by: NURSE PRACTITIONER

## 2020-06-16 PROCEDURE — G8399 PT W/DXA RESULTS DOCUMENT: HCPCS | Performed by: NURSE PRACTITIONER

## 2020-06-16 PROCEDURE — G8427 DOCREV CUR MEDS BY ELIG CLIN: HCPCS | Performed by: NURSE PRACTITIONER

## 2020-06-16 PROCEDURE — 1036F TOBACCO NON-USER: CPT | Performed by: NURSE PRACTITIONER

## 2020-06-16 ASSESSMENT — PATIENT HEALTH QUESTIONNAIRE - PHQ9
1. LITTLE INTEREST OR PLEASURE IN DOING THINGS: 0
SUM OF ALL RESPONSES TO PHQ QUESTIONS 1-9: 0
SUM OF ALL RESPONSES TO PHQ9 QUESTIONS 1 & 2: 0
SUM OF ALL RESPONSES TO PHQ QUESTIONS 1-9: 0
2. FEELING DOWN, DEPRESSED OR HOPELESS: 0

## 2020-06-16 ASSESSMENT — ENCOUNTER SYMPTOMS
SHORTNESS OF BREATH: 0
COUGH: 0

## 2020-06-17 RX ORDER — AMLODIPINE BESYLATE 10 MG/1
TABLET ORAL
Qty: 90 TABLET | Refills: 1 | Status: SHIPPED | OUTPATIENT
Start: 2020-06-17 | End: 2020-11-06

## 2020-07-12 RX ORDER — METOPROLOL SUCCINATE 50 MG/1
TABLET, EXTENDED RELEASE ORAL
Qty: 90 TABLET | Refills: 1 | Status: SHIPPED | OUTPATIENT
Start: 2020-07-12 | End: 2020-09-04 | Stop reason: SDUPTHER

## 2020-08-27 RX ORDER — SERTRALINE HYDROCHLORIDE 100 MG/1
TABLET, FILM COATED ORAL
Qty: 90 TABLET | Refills: 1 | Status: SHIPPED | OUTPATIENT
Start: 2020-08-27 | End: 2021-01-15

## 2020-09-04 ENCOUNTER — OFFICE VISIT (OUTPATIENT)
Dept: CARDIOLOGY CLINIC | Age: 80
End: 2020-09-04
Payer: MEDICARE

## 2020-09-04 VITALS — SYSTOLIC BLOOD PRESSURE: 138 MMHG | DIASTOLIC BLOOD PRESSURE: 82 MMHG | HEART RATE: 110 BPM | OXYGEN SATURATION: 98 %

## 2020-09-04 PROCEDURE — G8427 DOCREV CUR MEDS BY ELIG CLIN: HCPCS | Performed by: INTERNAL MEDICINE

## 2020-09-04 PROCEDURE — 93000 ELECTROCARDIOGRAM COMPLETE: CPT | Performed by: INTERNAL MEDICINE

## 2020-09-04 PROCEDURE — 4040F PNEUMOC VAC/ADMIN/RCVD: CPT | Performed by: INTERNAL MEDICINE

## 2020-09-04 PROCEDURE — 1090F PRES/ABSN URINE INCON ASSESS: CPT | Performed by: INTERNAL MEDICINE

## 2020-09-04 PROCEDURE — 1036F TOBACCO NON-USER: CPT | Performed by: INTERNAL MEDICINE

## 2020-09-04 PROCEDURE — G8420 CALC BMI NORM PARAMETERS: HCPCS | Performed by: INTERNAL MEDICINE

## 2020-09-04 PROCEDURE — 1123F ACP DISCUSS/DSCN MKR DOCD: CPT | Performed by: INTERNAL MEDICINE

## 2020-09-04 PROCEDURE — G8399 PT W/DXA RESULTS DOCUMENT: HCPCS | Performed by: INTERNAL MEDICINE

## 2020-09-04 PROCEDURE — 99213 OFFICE O/P EST LOW 20 MIN: CPT | Performed by: INTERNAL MEDICINE

## 2020-09-04 RX ORDER — METOPROLOL SUCCINATE 100 MG/1
100 TABLET, EXTENDED RELEASE ORAL DAILY
Qty: 90 TABLET | Refills: 3 | Status: SHIPPED | OUTPATIENT
Start: 2020-09-04 | End: 2021-11-02 | Stop reason: SDUPTHER

## 2020-09-04 NOTE — PROGRESS NOTES
Chief Complaint   Patient presents with    Atrial Fibrillation    6 Month Follow-Up       Patient presents for initial medical evaluation. Patient is followed on a regular basis by Dr. Jason Shankar, APRN - CNP. Would like to switch cardiologist. Has hx of Afibb since 2016. On sotalol 120mg BID and not on any NOAC's at this time. EKG yesterday shows afibb with CVR. Does not really feel that she is in afibb. Pt denies chest pain, dyspnea, dyspnea on exertion, change in exercise capacity, fatigue,  nausea, vomiting, diarrhea, constipation, motor weakness, insomnia, weight loss, syncope, dizziness, lightheadedness,  PND, orthopnea, or claudication. No nitro use. BP and hr are good. No LE discoloration or ulcers. No LE edema. No CHF type symptoms. Lipid profile is normal. No recent hospitalization. No change in meds. No hx of CVN. ECHO in 2016 EF of 50%, mod LAE, mild TR, PHTN 41mMHg. Nuc stress test in 2015 was normal.        11-30-18: EKG with Afibb at 103bpm. On Eliquis now for 6 weeks. No nose bleeds so far. Does not really feel that she is in afibb. Pt denies chest pain, dyspnea, dyspnea on exertion, change in exercise capacity, fatigue,  nausea, vomiting, diarrhea, constipation, motor weakness, insomnia, weight loss, syncope, dizziness, lightheadedness,  PND, orthopnea, or claudication. No nitro use. BP and hr are good. No LE discoloration or ulcers. No LE edema. No CHF type symptoms. Lipid profile is normal. No recent hospitalization. No change in meds. No hx of CVN. 12-13-18: s/p successful CVN but EKG today shows afibb with CVR. She felt it yesterday. She has been on sotalol 120mg BID, normal QTC. Pt denies chest pain, dyspnea, dyspnea on exertion, change in exercise capacity, fatigue,  nausea, vomiting, diarrhea, constipation, motor weakness, insomnia, weight loss, syncope, dizziness, lightheadedness, palpitations, PND, orthopnea, or claudication. No nitro use. BP and hr are good.  CAD is Inability: None    Transportation needs     Medical: None     Non-medical: None   Tobacco Use    Smoking status: Former Smoker     Packs/day: 0.33     Years: 2.00     Pack years: 0.66     Last attempt to quit: 1982     Years since quittin.2    Smokeless tobacco: Never Used   Substance and Sexual Activity    Alcohol use: No    Drug use: No    Sexual activity: None   Lifestyle    Physical activity     Days per week: None     Minutes per session: None    Stress: None   Relationships    Social connections     Talks on phone: None     Gets together: None     Attends Scientologist service: None     Active member of club or organization: None     Attends meetings of clubs or organizations: None     Relationship status: None    Intimate partner violence     Fear of current or ex partner: None     Emotionally abused: None     Physically abused: None     Forced sexual activity: None   Other Topics Concern    None   Social History Narrative    None       Family History   Problem Relation Age of Onset    Cancer Sister         colon, brain       Current Outpatient Medications   Medication Sig Dispense Refill    metoprolol succinate (TOPROL XL) 100 MG extended release tablet Take 1 tablet by mouth daily 90 tablet 3    sertraline (ZOLOFT) 100 MG tablet TAKE 1 TABLET EVERY DAY 90 tablet 1    amLODIPine (NORVASC) 10 MG tablet TAKE 1 TABLET EVERY DAY 90 tablet 1    ELIQUIS 5 MG TABS tablet TAKE 1 TABLET TWICE DAILY 180 tablet 1    PREVIDENT 5000 BOOSTER PLUS 1.1 % PSTE BRUSH AND SPIT AT BEDTIME.  NO EATING DRINKING  OR RINSING FOR 30 MINUTES  5    Magnesium Oxide 250 MG TABS Take by mouth daily      Cetirizine HCl (ZYRTEC ALLERGY) 10 MG CAPS Take 10 mg by mouth daily as needed       B COMPLEX-C ER PO Take by mouth daily      vitamin D3 (CHOLECALCIFEROL) 400 units TABS tablet Take 400 Units by mouth daily       Ascorbic Acid (VITAMIN C) 1000 MG tablet Take 1,000 mg by mouth daily       Cyanocobalamin (B-12 PO) Take 1,000 mg by mouth daily Pt said she splits a 2500mg dissolving tab in half so approximately 1200mg  Daily. No current facility-administered medications for this visit. Dofetilide; Dust mite extract; and Molds & smuts    Review of Systems:  General ROS: negative  Psychological ROS: negative  Hematological and Lymphatic ROS: No history of blood clots or bleeding disorder. Respiratory ROS: no cough, shortness of breath, or wheezing  Cardiovascular ROS: no chest pain or dyspnea on exertion  Gastrointestinal ROS: no abdominal pain, change in bowel habits, or black or bloody stools  Genito-Urinary ROS: no dysuria, trouble voiding, or hematuria  Musculoskeletal ROS: negative  Neurological ROS: no TIA or stroke symptoms  Dermatological ROS: negative    VITALS:  Blood pressure 138/82, pulse 110, SpO2 98 %, not currently breastfeeding. There is no height or weight on file to calculate BMI. Physical Examination:  General appearance - alert, well appearing, and in no distress  Mental status - alert, oriented to person, place, and time  Neck - Neck is supple, no JVD or carotid bruits. No thyromegaly or adenopathy. Chest - clear to auscultation, no wheezes, rales or rhonchi, symmetric air entry  Heart - normal rate, regular rhythm, normal S1, S2, no murmurs, rubs, clicks or gallops  Abdomen - soft, nontender, nondistended, no masses or organomegaly  Neurological - alert, oriented, normal speech, no focal findings or movement disorder noted  Extremities - peripheral pulses normal, no pedal edema, no clubbing or cyanosis  Skin - normal coloration and turgor, no rashes, no suspicious skin lesions noted      EKG: atrial fibrillation    Orders Placed This Encounter   Procedures    EKG 12 lead       ASSESSMENT:     Diagnosis Orders   1. Persistent atrial fibrillation  EKG 12 lead   2. Benign essential hypertension     3.  Essential hypertension  metoprolol succinate (TOPROL XL) 100 MG extended release tablet         PLAN:     Patient will need to continue to follow up with you for their general medical care    As always, aggressive risk factor modification is strongly recommended. We should adhere to the JNC VIII guidelines for HTN management and the NCEP ATP III guidelines for LDL-C management. Cardiac diet is always recommended with low fat, cholesterol, calories and sodium. Continue medications at current doses. NO sotalol since she is in afib. Did not tolerate Tikosyn. She does not want any further tx at this time. Increase Toprol XL to 100mg daily. Cont with Eliquis. Check EKG    Patient was advised and encouraged to check blood pressure at home or at a pharmacy, maintain a logbook, and also call us back if blood pressure are above the target ranges or if it is low. Patient clearly understands and agrees to the instructions. We will need to continue to monitor muscle and liver enzymes, BUN, CR, and electrolytes. Details of medical condition explained and patient was warned about adverse consequences of uncontrolled medical conditions and possible side effects of prescribed medications.

## 2020-11-06 RX ORDER — AMLODIPINE BESYLATE 10 MG/1
TABLET ORAL
Qty: 90 TABLET | Refills: 1 | Status: SHIPPED | OUTPATIENT
Start: 2020-11-06 | End: 2021-03-22

## 2020-12-17 ENCOUNTER — VIRTUAL VISIT (OUTPATIENT)
Dept: FAMILY MEDICINE CLINIC | Age: 80
End: 2020-12-17
Payer: MEDICARE

## 2020-12-17 VITALS
HEIGHT: 62 IN | BODY MASS INDEX: 24.48 KG/M2 | SYSTOLIC BLOOD PRESSURE: 150 MMHG | WEIGHT: 133 LBS | DIASTOLIC BLOOD PRESSURE: 97 MMHG | HEART RATE: 91 BPM

## 2020-12-17 PROCEDURE — 1123F ACP DISCUSS/DSCN MKR DOCD: CPT | Performed by: NURSE PRACTITIONER

## 2020-12-17 PROCEDURE — G8484 FLU IMMUNIZE NO ADMIN: HCPCS | Performed by: NURSE PRACTITIONER

## 2020-12-17 PROCEDURE — G0439 PPPS, SUBSEQ VISIT: HCPCS | Performed by: NURSE PRACTITIONER

## 2020-12-17 PROCEDURE — 4040F PNEUMOC VAC/ADMIN/RCVD: CPT | Performed by: NURSE PRACTITIONER

## 2020-12-17 SDOH — ECONOMIC STABILITY: FOOD INSECURITY: WITHIN THE PAST 12 MONTHS, YOU WORRIED THAT YOUR FOOD WOULD RUN OUT BEFORE YOU GOT MONEY TO BUY MORE.: NEVER TRUE

## 2020-12-17 SDOH — ECONOMIC STABILITY: TRANSPORTATION INSECURITY
IN THE PAST 12 MONTHS, HAS LACK OF TRANSPORTATION KEPT YOU FROM MEETINGS, WORK, OR FROM GETTING THINGS NEEDED FOR DAILY LIVING?: NO

## 2020-12-17 SDOH — ECONOMIC STABILITY: FOOD INSECURITY: WITHIN THE PAST 12 MONTHS, THE FOOD YOU BOUGHT JUST DIDN'T LAST AND YOU DIDN'T HAVE MONEY TO GET MORE.: NEVER TRUE

## 2020-12-17 SDOH — ECONOMIC STABILITY: INCOME INSECURITY: HOW HARD IS IT FOR YOU TO PAY FOR THE VERY BASICS LIKE FOOD, HOUSING, MEDICAL CARE, AND HEATING?: NOT HARD AT ALL

## 2020-12-17 SDOH — ECONOMIC STABILITY: TRANSPORTATION INSECURITY
IN THE PAST 12 MONTHS, HAS THE LACK OF TRANSPORTATION KEPT YOU FROM MEDICAL APPOINTMENTS OR FROM GETTING MEDICATIONS?: NO

## 2020-12-17 ASSESSMENT — PATIENT HEALTH QUESTIONNAIRE - PHQ9
SUM OF ALL RESPONSES TO PHQ QUESTIONS 1-9: 0
SUM OF ALL RESPONSES TO PHQ9 QUESTIONS 1 & 2: 0
SUM OF ALL RESPONSES TO PHQ QUESTIONS 1-9: 0
2. FEELING DOWN, DEPRESSED OR HOPELESS: 0
SUM OF ALL RESPONSES TO PHQ QUESTIONS 1-9: 0
1. LITTLE INTEREST OR PLEASURE IN DOING THINGS: 0

## 2020-12-17 ASSESSMENT — LIFESTYLE VARIABLES: HOW OFTEN DO YOU HAVE A DRINK CONTAINING ALCOHOL: 0

## 2020-12-17 NOTE — PATIENT INSTRUCTIONS
Personalized Preventive Plan for Alexandro Yepez - 12/17/2020  Medicare offers a range of preventive health benefits. Some of the tests and screenings are paid in full while other may be subject to a deductible, co-insurance, and/or copay. Some of these benefits include a comprehensive review of your medical history including lifestyle, illnesses that may run in your family, and various assessments and screenings as appropriate. After reviewing your medical record and screening and assessments performed today your provider may have ordered immunizations, labs, imaging, and/or referrals for you. A list of these orders (if applicable) as well as your Preventive Care list are included within your After Visit Summary for your review. Other Preventive Recommendations:    · A preventive eye exam performed by an eye specialist is recommended every 1-2 years to screen for glaucoma; cataracts, macular degeneration, and other eye disorders. · A preventive dental visit is recommended every 6 months. · Try to get at least 150 minutes of exercise per week or 10,000 steps per day on a pedometer . · Order or download the FREE \"Exercise & Physical Activity: Your Everyday Guide\" from [employer]. Call 2-254.296.8147 or search [employer] online. · You need 3109-1091 mg of calcium and 5755-8995 IU of vitamin D per day. It is possible to meet your calcium requirement with diet alone, but a vitamin D supplement is usually necessary to meet this goal.  · When exposed to the sun, use a sunscreen that protects against both UVA and UVB radiation with an SPF of 30 or greater. Reapply every 2 to 3 hours or after sweating, drying off with a towel, or swimming. · Always wear a seat belt when traveling in a car. Always wear a helmet when riding a bicycle or motorcycle.

## 2020-12-17 NOTE — PROGRESS NOTES
Medicare Annual Wellness Visit  Are Name: Glenys Collins Date: 2020   MRN: [id number] Sex: Female   Age: 68 y.o. Ethnicity: Non-/Non    : 1940 Race: Marge Amado is here for Medicare AWV    Screenings for behavioral, psychosocial and functional/safety risks, and cognitive dysfunction are all negative except as indicated below. These results, as well as other patient data from the [address] [place] by [facility] form, are documented in Flowsheets linked to this Encounter. Overall pt is doing well    No major changes in health    Allergies   Allergen Reactions    Dofetilide Other (See Comments)     VERTIGO    Dust Mite Extract     Molds & Smuts        Prior to Visit Medications    Medication Sig Taking? Authorizing Provider   amLODIPine (NORVASC) 10 MG tablet TAKE 1 TABLET EVERY DAY Yes NICOLETTE Reaves CNP   metoprolol succinate (TOPROL XL) 100 MG extended release tablet Take 1 tablet by mouth daily Yes Raj Gore,    sertraline (ZOLOFT) 100 MG tablet TAKE 1 TABLET EVERY DAY Yes NICOLETTE Reaves CNP   ELIQUIS 5 MG TABS tablet TAKE 1 TABLET TWICE DAILY Yes NICOLETTE Reaves CNP   Magnesium Oxide 250 MG TABS Take by mouth daily Yes Historical Provider, MD   Cetirizine HCl (ZYRTEC ALLERGY) 10 MG CAPS Take 10 mg by mouth daily as needed  Yes Historical Provider, MD   B COMPLEX-C ER PO Take by mouth daily Yes Historical Provider, MD   vitamin D3 (CHOLECALCIFEROL) 400 units TABS tablet Take 400 Units by mouth daily  Yes Historical Provider, MD   Ascorbic Acid (VITAMIN C) 1000 MG tablet Take 1,000 mg by mouth daily  Yes Historical Provider, MD   Cyanocobalamin (B-12 PO) Take 1,000 mg by mouth daily Pt said she splits a 2500mg dissolving tab in half so approximately 1200mg  Daily. Yes Historical Provider, MD   PREVIDENT 5000 BOOSTER PLUS 1.1 % PSTE BRUSH AND SPIT AT BEDTIME.  NO EATING DRINKING  OR RINSING FOR 30 MINUTES  Historical Provider, MD General Health Risk Interventions:  · No Living Will: Advance Care Planning addressed with patient today     Hearing/Vision:  No exam data present  Hearing/Vision  Do you or your family notice any trouble with your hearing?: (!) Yes  Do you have difficulty driving, watching TV, or doing any of your daily activities because of your eyesight?: No  Have you had an eye exam within the past year?: (!) No  Hearing/Vision Interventions:  · Hearing concerns:  patient declines any further evaluation/treatment for hearing issues  · Vision concerns:  patient encouraged to make appointment with his/her eye specialist    Safety:  Safety  Do you have working smoke detectors?: Yes  Have all throw rugs been removed or fastened?: Yes  Do you have non-slip mats or surfaces in all bathtubs/showers?: Yes  Do all of your stairways have a railing or banister?: (!) No  Are your doorways, halls and stairs free of clutter?: Yes  Do you always fasten your seatbelt when you are in a car?: Yes  Safety Interventions:  · Home safety tips provided     Personalized Preventive Plan   Current Health Maintenance Status  Immunization History   Administered Date(s) Administered    DT (pediatric) 05/27/2005    Influenza Vaccine, unspecified formulation 11/01/2008    Influenza Virus Vaccine 11/01/2008    Influenza, Quadv, adjuvanted, 65 yrs +, IM, PF (Fluad) 11/09/2020    Pneumococcal Conjugate 13-valent ([id number]) 11/14/2016    Pneumococcal Polysaccharide ([id number]) 06/01/2007    Td, unspecified formulation 05/27/2005        Health Maintenance   Topic Date Due    Shingles Vaccine (1 of 2) 01/09/1992    DTaP/Tdap/Td vaccine (2 - Tdap) 05/27/2015    Annual Wellness Visit (AWV)  05/29/2019    Potassium monitoring  06/16/2021    Creatinine monitoring  06/16/2021    Colon cancer screen colonoscopy  08/17/2025    DEXA (modify frequency per FRAX score)  Completed    Flu vaccine  Completed    Pneumococcal 65+ years Vaccine  Completed  Hepatitis A vaccine  Aged Out    Hepatitis B vaccine  Aged Out    Hib vaccine  Aged Out    Meningococcal (ACWY) vaccine  Aged Out     Recommendations for Radiation Monitoring Devices Due: see orders and patient instructions/AVS.  . Recommended screening schedule for the next 5-10 years is provided to the patient in written form: see Patient Instructions/AVS.    There are no diagnoses linked to this encounter. Kelly Burns is a 68 y.o. female being evaluated by a Virtual Visit (phone) encounter to address concerns as mentioned above. A caregiver was present when appropriate. Due to this being a TeleHealth encounter (During TCZTJ-09 public health emergency), evaluation of the following organ systems was limited: Vitals/Constitutional/EENT/Resp/CV/GI//MS/Neuro/Skin/Heme-Lymph-Imm. Pursuant to the emergency declaration under the [address] [place] [address], [address] [place] [address] authority and the [employer] and [employer] Act, this Virtual Visit was conducted with patient's (and/or legal guardian's) consent, to reduce the patient's risk of exposure to COVID-19 and provide necessary medical care. The patient (and/or legal guardian) has also been advised to contact this office for worsening conditions or problems, and seek emergency medical treatment and/or call 911 if deemed necessary. Patient identification was verified at the start of the visit: Yes    Services were provided through phone to substitute for in-person clinic visit. Patient and provider were located at their individual homes. --NICOLETTE Perry CNP on 12/17/2020 at 11:50 AM    An electronic signature was used to authenticate this note.

## 2021-01-14 DIAGNOSIS — F41.1 ANXIETY STATE: ICD-10-CM

## 2021-01-14 DIAGNOSIS — R53.83 FATIGUE, UNSPECIFIED TYPE: ICD-10-CM

## 2021-01-14 DIAGNOSIS — I10 ESSENTIAL HYPERTENSION: ICD-10-CM

## 2021-01-14 LAB
ALBUMIN SERPL-MCNC: 4.5 G/DL (ref 3.5–4.6)
ALP BLD-CCNC: 61 U/L (ref 40–130)
ALT SERPL-CCNC: 15 U/L (ref 0–33)
ANION GAP SERPL CALCULATED.3IONS-SCNC: 14 MEQ/L (ref 9–15)
AST SERPL-CCNC: 19 U/L (ref 0–35)
BASOPHILS ABSOLUTE: 0.1 K/UL (ref 0–0.2)
BASOPHILS RELATIVE PERCENT: 3 %
BILIRUB SERPL-MCNC: 0.6 MG/DL (ref 0.2–0.7)
BUN BLDV-MCNC: 12 MG/DL (ref 8–23)
CALCIUM SERPL-MCNC: 10.1 MG/DL (ref 8.5–9.9)
CHLORIDE BLD-SCNC: 101 MEQ/L (ref 95–107)
CO2: 27 MEQ/L (ref 20–31)
CREAT SERPL-MCNC: 0.81 MG/DL (ref 0.5–0.9)
EOSINOPHILS ABSOLUTE: 0.1 K/UL (ref 0–0.7)
EOSINOPHILS RELATIVE PERCENT: 3.1 %
GFR AFRICAN AMERICAN: >60
GFR NON-AFRICAN AMERICAN: >60
GLOBULIN: 3.2 G/DL (ref 2.3–3.5)
GLUCOSE BLD-MCNC: 97 MG/DL (ref 70–99)
HCT VFR BLD CALC: 46.1 % (ref 37–47)
HEMOGLOBIN: 15.5 G/DL (ref 12–16)
LYMPHOCYTES ABSOLUTE: 0.9 K/UL (ref 1–4.8)
LYMPHOCYTES RELATIVE PERCENT: 23.7 %
MCH RBC QN AUTO: 31.3 PG (ref 27–31.3)
MCHC RBC AUTO-ENTMCNC: 33.5 % (ref 33–37)
MCV RBC AUTO: 93.3 FL (ref 82–100)
MONOCYTES ABSOLUTE: 0.5 K/UL (ref 0.2–0.8)
MONOCYTES RELATIVE PERCENT: 13.8 %
NEUTROPHILS ABSOLUTE: 2.2 K/UL (ref 1.4–6.5)
NEUTROPHILS RELATIVE PERCENT: 56.4 %
PDW BLD-RTO: 13.9 % (ref 11.5–14.5)
PLATELET # BLD: 212 K/UL (ref 130–400)
POTASSIUM SERPL-SCNC: 4 MEQ/L (ref 3.4–4.9)
RBC # BLD: 4.94 M/UL (ref 4.2–5.4)
SODIUM BLD-SCNC: 142 MEQ/L (ref 135–144)
T4 FREE: 1.18 NG/DL (ref 0.84–1.68)
TOTAL PROTEIN: 7.7 G/DL (ref 6.3–8)
TSH REFLEX: 4.77 UIU/ML (ref 0.44–3.86)
WBC # BLD: 4 K/UL (ref 4.8–10.8)

## 2021-01-15 DIAGNOSIS — F41.9 ANXIETY: ICD-10-CM

## 2021-01-15 RX ORDER — SERTRALINE HYDROCHLORIDE 100 MG/1
TABLET, FILM COATED ORAL
Qty: 90 TABLET | Refills: 1 | Status: SHIPPED | OUTPATIENT
Start: 2021-01-15 | End: 2021-12-30 | Stop reason: SDUPTHER

## 2021-02-08 ENCOUNTER — HOSPITAL ENCOUNTER (EMERGENCY)
Age: 81
Discharge: HOME | End: 2021-02-08
Attending: STUDENT IN AN ORGANIZED HEALTH CARE EDUCATION/TRAINING PROGRAM
Payer: MEDICARE

## 2021-02-08 VITALS
DIASTOLIC BLOOD PRESSURE: 82 MMHG | SYSTOLIC BLOOD PRESSURE: 128 MMHG | TEMPERATURE: 98.1 F | BODY MASS INDEX: 24.66 KG/M2 | WEIGHT: 134 LBS | RESPIRATION RATE: 20 BRPM | OXYGEN SATURATION: 98 % | HEIGHT: 62 IN | HEART RATE: 102 BPM

## 2021-02-08 DIAGNOSIS — R04.0 ANTERIOR EPISTAXIS: Primary | ICD-10-CM

## 2021-02-08 DIAGNOSIS — I48.20 CHRONIC ATRIAL FIBRILLATION (HCC): ICD-10-CM

## 2021-02-08 DIAGNOSIS — Z79.01 CHRONIC ANTICOAGULATION: ICD-10-CM

## 2021-02-08 PROCEDURE — 99285 EMERGENCY DEPT VISIT HI MDM: CPT

## 2021-02-08 PROCEDURE — 6370000000 HC RX 637 (ALT 250 FOR IP): Performed by: STUDENT IN AN ORGANIZED HEALTH CARE EDUCATION/TRAINING PROGRAM

## 2021-02-08 PROCEDURE — 30901 CONTROL OF NOSEBLEED: CPT

## 2021-02-08 PROCEDURE — 2500000003 HC RX 250 WO HCPCS: Performed by: STUDENT IN AN ORGANIZED HEALTH CARE EDUCATION/TRAINING PROGRAM

## 2021-02-08 RX ADMIN — PHENYLEPHRINE HYDROCHLORIDE 1 SPRAY: 0.5 SPRAY NASAL at 19:56

## 2021-02-08 RX ADMIN — METOPROLOL TARTRATE 25 MG: 25 TABLET, FILM COATED ORAL at 22:07

## 2021-02-08 ASSESSMENT — ENCOUNTER SYMPTOMS
COUGH: 0
SINUS PRESSURE: 0
CHEST TIGHTNESS: 0
VOMITING: 0
ABDOMINAL PAIN: 0
BACK PAIN: 0
DIARRHEA: 0
TROUBLE SWALLOWING: 0
SHORTNESS OF BREATH: 0

## 2021-02-09 NOTE — ED NOTES
Bed: 17  Expected date: 2/8/21  Expected time:   Means of arrival:   Comments:  80year old female nosebleed x 35 minutes on eloquis 162/90, 115,      April L Lokesh Felton RN  02/08/21 1910

## 2021-02-09 NOTE — ED TRIAGE NOTES
Pt presents to ED from home via EMS with c/o epistaxis. Pt states that she was eating \"hot peppers\" PTA and developed a nose bleed. Pt states that same thing occurred last night, however she was able to control bleeding. Pt is on eliquis. Pt denies trauma. Upon arrival to ED, nasal clamp applied and bleeding controlled. Pt is A/Ox4, skin p/w/d, resp even and unlabored, msp's intact. Pt denies cp, sob, n/v/d, fever, or chills.

## 2021-02-09 NOTE — ED PROVIDER NOTES
sinus pressure and trouble swallowing. Respiratory: Negative for cough, chest tightness and shortness of breath. Cardiovascular: Negative for chest pain and leg swelling. Gastrointestinal: Negative for abdominal pain, diarrhea and vomiting. Endocrine: Negative for polydipsia and polyphagia. Genitourinary: Negative for dysuria, flank pain and frequency. Musculoskeletal: Negative for back pain and myalgias. Skin: Negative for pallor and rash. Neurological: Negative for syncope, weakness and headaches. Hematological: Does not bruise/bleed easily. All other systems reviewed and are negative. Except as noted above the remainder of the review of systems was reviewed and negative. PAST MEDICAL HISTORY     Past Medical History:   Diagnosis Date    Allergic rhinitis     Anxiety     Atrial fibrillation (HCC)     Chronic kidney disease     Epistaxis 3/14/2017    Headache     Hypertension     Hypothyroidism     Inflamed seborrheic keratosis          SURGICALHISTORY       Past Surgical History:   Procedure Laterality Date    HYSTERECTOMY           CURRENT MEDICATIONS       Previous Medications    AMLODIPINE (NORVASC) 10 MG TABLET    TAKE 1 TABLET EVERY DAY    ASCORBIC ACID (VITAMIN C) 1000 MG TABLET    Take 1,000 mg by mouth daily     B COMPLEX-C ER PO    Take by mouth daily    CETIRIZINE HCL (ZYRTEC ALLERGY) 10 MG CAPS    Take 10 mg by mouth daily as needed     CYANOCOBALAMIN (B-12 PO)    Take 1,000 mg by mouth daily Pt said she splits a 2500mg dissolving tab in half so approximately 1200mg  Daily. ELIQUIS 5 MG TABS TABLET    TAKE 1 TABLET TWICE DAILY    MAGNESIUM OXIDE 250 MG TABS    Take by mouth daily    METOPROLOL SUCCINATE (TOPROL XL) 100 MG EXTENDED RELEASE TABLET    Take 1 tablet by mouth daily    PREVIDENT 5000 BOOSTER PLUS 1.1 % PSTE    BRUSH AND SPIT AT BEDTIME.  NO EATING DRINKING  OR RINSING FOR 30 MINUTES    SERTRALINE (ZOLOFT) 100 MG TABLET    TAKE 1 TABLET EVERY DAY VITAMIN D3 (CHOLECALCIFEROL) 400 UNITS TABS TABLET    Take 400 Units by mouth daily        ALLERGIES     Dofetilide, Dust mite extract, and Molds & smuts    FAMILY HISTORY       Family History   Problem Relation Age of Onset    Cancer Sister         colon, brain          SOCIAL HISTORY       Social History     Socioeconomic History    Marital status:      Spouse name: None    Number of children: None    Years of education: None    Highest education level: None   Occupational History    None   Social Needs    Financial resource strain: Not hard at all   Elana-Ezra insecurity     Worry: Never true     Inability: Never true    Transportation needs     Medical: No     Non-medical: No   Tobacco Use    Smoking status: Former Smoker     Packs/day: 0.33     Years: 2.00     Pack years: 0.66     Quit date: 1982     Years since quittin.6    Smokeless tobacco: Never Used   Substance and Sexual Activity    Alcohol use: No    Drug use: No    Sexual activity: None   Lifestyle    Physical activity     Days per week: None     Minutes per session: None    Stress: None   Relationships    Social connections     Talks on phone: None     Gets together: None     Attends Zoroastrian service: None     Active member of club or organization: None     Attends meetings of clubs or organizations: None     Relationship status: None    Intimate partner violence     Fear of current or ex partner: None     Emotionally abused: None     Physically abused: None     Forced sexual activity: None   Other Topics Concern    None   Social History Narrative    None       SCREENINGS    [place] Coma Scale  Eye Opening: Spontaneous  Best Verbal Response: Oriented  Best Motor Response: Obeys commands  Arti Coma Scale Score: 15 @FLOW([id number])@      PHYSICAL EXAM    (up to 7 for level 4, 8 or more for level 5)     ED Triage Vitals [21 1911]   BP Temp Temp Source Pulse Resp SpO2 Height Weight   (!) 142/79 98.1 °F (36.7 °C) Temporal 98 19 94 % 5' 2\" (1.575 m) 134 lb (60.8 kg)       Physical Exam  Vitals signs and nursing note reviewed. Constitutional:       General: She is awake. She is not in acute distress. Appearance: Normal appearance. She is well-developed and normal weight. She is not ill-appearing, toxic-appearing or diaphoretic. Comments: No photophobia. No phonophobia. HENT:      Head: Normocephalic and atraumatic. No Farooq's sign. Right Ear: External ear normal.      Left Ear: External ear normal.      Nose: No congestion or rhinorrhea. Right Nostril: Epistaxis present. Mouth/Throat:      Mouth: Mucous membranes are moist.      Pharynx: Oropharynx is clear. No oropharyngeal exudate or posterior oropharyngeal erythema. Eyes:      General: No scleral icterus. Right eye: No foreign body or discharge. Left eye: No discharge. Extraocular Movements: Extraocular movements intact. Conjunctiva/sclera: Conjunctivae normal.      Left eye: No exudate. Pupils: Pupils are equal, round, and reactive to light. Neck:      Musculoskeletal: Normal range of motion and neck supple. No neck rigidity. Vascular: No JVD. Trachea: No tracheal deviation. Comments: No meningismus. Cardiovascular:      Rate and Rhythm: Normal rate. Rhythm irregularly irregular. Pulses: Normal pulses. Radial pulses are 2+ on the right side and 2+ on the left side. Heart sounds: Normal heart sounds, S1 normal and S2 normal. Heart sounds not distant. No murmur. No systolic murmur. No diastolic murmur. No friction rub. No gallop. No S3 or S4 sounds. Pulmonary:      Effort: Pulmonary effort is normal. No respiratory distress. Breath sounds: Normal breath sounds. No stridor. No wheezing, rhonchi or rales. Chest:      Chest wall: No tenderness. Abdominal:      General: Abdomen is flat. Bowel sounds are normal. There is no distension. Palpations: Abdomen is soft. There is no mass. Tenderness: There is no abdominal tenderness. There is no right CVA tenderness, left CVA tenderness, guarding or rebound. Hernia: No hernia is present. Musculoskeletal: Normal range of motion. General: No swelling, tenderness, deformity or signs of injury. Right lower leg: No edema. Left lower leg: No edema. Lymphadenopathy:      Head:      Right side of head: No submental adenopathy. Left side of head: No submental adenopathy. Skin:     General: Skin is warm and dry. Capillary Refill: Capillary refill takes less than 2 seconds. Coloration: Skin is not jaundiced or pale. Findings: No bruising, erythema, lesion or rash. Neurological:      General: No focal deficit present. Mental Status: She is alert and oriented to person, place, and time. Mental status is at baseline. Cranial Nerves: No cranial nerve deficit. Sensory: No sensory deficit. Motor: No weakness. Coordination: Coordination normal.      Deep Tendon Reflexes: Reflexes are normal and symmetric. Psychiatric:         Mood and Affect: Mood normal.         Behavior: Behavior normal. Behavior is cooperative. Thought Content:  Thought content normal.         Judgment: Judgment normal.         DIAGNOSTIC RESULTS     EKG: All EKG's are interpreted by the Emergency Department Physician who either signs or Co-signsthis chart in the absence of a cardiologist.        RADIOLOGY:   Lanis Odilia such as CT, Ultrasound and MRI are read by the radiologist. Plain radiographic images are visualized and preliminarily interpreted by the emergency physician with the below findings:        Interpretation per the Radiologist below, if available at the time ofthis note:    No orders to display         ED BEDSIDE ULTRASOUND:   Performed by ED Physician - none    LABS:  Labs Reviewed - No data to display    All other labs were within normal range or not returned as of this dictation. EMERGENCY DEPARTMENT COURSE and DIFFERENTIAL DIAGNOSIS/MDM:   Vitals:    Vitals:    02/08/21 1911 02/08/21 2151 02/08/21 2205   BP: (!) 142/79 (!) 140/93 128/82   Pulse: 98 104 110   Resp: 19 20 20   Temp: 98.1 °F (36.7 °C)     TempSrc: Temporal     SpO2: 94% 94% 98%   Weight: 134 lb (60.8 kg)     Height: 5' 2\" (1.575 m)             MDM  Patient advised not to drink hot or cold drinks. Not to rub or pick at her nose. ED attending explained to the patient is like gelatin it starts out soft and slowly solidifies. He explained that a nice solid clot will take approximately 3 days to form. Patient's skip tomorrow's dose of her blood thinner. ED attending explained the risks of getting a stroke while not on her blood thinner however the blood thinner will prevent clot formation. Patient was also advised to call her cardiologist tomorrow for advice. She is to try to get into see ENT even asking to see if there is a cancellation of an appointment. On reexamination the patient's had hemostasis left anterior naris. Findings were discussed with the patient and her son. The patient verbalized understanding the care was invited to return if recurrent of nosebleed. She is advised to use nasal clip for 20 minutes if recurrence of bleeding and if still bleeding then to return to the emergency room. The patient and her son verbalized understand the care and have no further questions the care discussed with them. CONSULTS:  None    PROCEDURES:  Unless otherwise noted below, none     Epistaxis Mgmt    Date/Time: 2/8/2021 9:00 PM  Performed by: Natty Mojica DO  Authorized by: Natty Mojica DO     Consent:     Consent obtained:  Verbal    Consent given by:  Patient    Risks discussed:  Bleeding, nasal injury and pain    Alternatives discussed:  Delayed treatment  Anesthesia (see MAR for exact dosages):      Anesthesia method:  None  Procedure details:     Treatment site:  L anterior Treatment method:  Silver nitrate    Treatment complexity:  Limited    Treatment episode: recurring    Post-procedure details:     Assessment:  Bleeding stopped    Patient tolerance of procedure: Tolerated well, no immediate complications  Comments: The patient did rub it her nose began bleeding again and she had a reapplication of silver nitrate. Hemostasis was then achieved. The ED attending explained to the patient she cannot rub, pick at her nose or blow her nose. He advised her to have open-mouth sneeze. FINAL IMPRESSION      1. Anterior epistaxis    2. Chronic atrial fibrillation (HCC)    3. Chronic anticoagulation          DISPOSITION/PLAN   DISPOSITION Discharge - Pending Orders Complete 02/08/2021 09:57:00 PM      PATIENT REFERRED TO:  Kareem Jiménez, APRN - CNP  [address] [facility] [address]  [place] [address]  [phone]    Call in 1 day      Kavitha Serrato MD  [address] [place] [address] [address] [facility] [phone]    Call in 1 day  To arrange a follow up appointment. Lachelle Ivan MD  [address]  [phone]    Call in 1 day  Inform your cardiologist that you are not taking the Eliquis due to nosebleed.       DISCHARGE MEDICATIONS:  New Prescriptions    No medications on file          (Please note that portions of this note were completed with a voice recognition program.  Efforts were made to edit the dictations but occasionally words are mis-transcribed.)    Andrea Perez DO (electronically signed)  Attending Emergency Physician          Andrea Perez DO  02/08/21 1528

## 2021-03-22 DIAGNOSIS — I10 ESSENTIAL HYPERTENSION: ICD-10-CM

## 2021-03-22 RX ORDER — AMLODIPINE BESYLATE 10 MG/1
TABLET ORAL
Qty: 90 TABLET | Refills: 1 | Status: SHIPPED | OUTPATIENT
Start: 2021-03-22 | End: 2022-05-02 | Stop reason: SDUPTHER

## 2021-03-26 ENCOUNTER — OFFICE VISIT (OUTPATIENT)
Dept: CARDIOLOGY CLINIC | Age: 81
End: 2021-03-26
Payer: MEDICARE

## 2021-03-26 VITALS
RESPIRATION RATE: 14 BRPM | DIASTOLIC BLOOD PRESSURE: 78 MMHG | OXYGEN SATURATION: 96 % | SYSTOLIC BLOOD PRESSURE: 110 MMHG | WEIGHT: 137.4 LBS | HEART RATE: 107 BPM | BODY MASS INDEX: 25.13 KG/M2

## 2021-03-26 DIAGNOSIS — I10 BENIGN ESSENTIAL HYPERTENSION: ICD-10-CM

## 2021-03-26 DIAGNOSIS — I48.19 PERSISTENT ATRIAL FIBRILLATION (HCC): Primary | ICD-10-CM

## 2021-03-26 DIAGNOSIS — R04.0 EPISTAXIS: ICD-10-CM

## 2021-03-26 PROCEDURE — 1123F ACP DISCUSS/DSCN MKR DOCD: CPT | Performed by: INTERNAL MEDICINE

## 2021-03-26 PROCEDURE — G8484 FLU IMMUNIZE NO ADMIN: HCPCS | Performed by: INTERNAL MEDICINE

## 2021-03-26 PROCEDURE — G8427 DOCREV CUR MEDS BY ELIG CLIN: HCPCS | Performed by: INTERNAL MEDICINE

## 2021-03-26 PROCEDURE — 1090F PRES/ABSN URINE INCON ASSESS: CPT | Performed by: INTERNAL MEDICINE

## 2021-03-26 PROCEDURE — 1036F TOBACCO NON-USER: CPT | Performed by: INTERNAL MEDICINE

## 2021-03-26 PROCEDURE — G8399 PT W/DXA RESULTS DOCUMENT: HCPCS | Performed by: INTERNAL MEDICINE

## 2021-03-26 PROCEDURE — G8417 CALC BMI ABV UP PARAM F/U: HCPCS | Performed by: INTERNAL MEDICINE

## 2021-03-26 PROCEDURE — 93000 ELECTROCARDIOGRAM COMPLETE: CPT | Performed by: INTERNAL MEDICINE

## 2021-03-26 PROCEDURE — 4040F PNEUMOC VAC/ADMIN/RCVD: CPT | Performed by: INTERNAL MEDICINE

## 2021-03-26 PROCEDURE — 99213 OFFICE O/P EST LOW 20 MIN: CPT | Performed by: INTERNAL MEDICINE

## 2021-03-26 NOTE — PROGRESS NOTES
Chief Complaint   Patient presents with    6 Month Follow-Up    Atrial Fibrillation    Epistaxis     more frequent       Patient presents for initial medical evaluation. Patient is followed on a regular basis by Dr. Zackery Canada, APRN - CNP. Would like to switch cardiologist. Has hx of Afibb since 2016. On sotalol 120mg BID and not on any NOAC's at this time. EKG yesterday shows afibb with CVR. Does not really feel that she is in afibb. Pt denies chest pain, dyspnea, dyspnea on exertion, change in exercise capacity, fatigue,  nausea, vomiting, diarrhea, constipation, motor weakness, insomnia, weight loss, syncope, dizziness, lightheadedness,  PND, orthopnea, or claudication. No nitro use. BP and hr are good. No LE discoloration or ulcers. No LE edema. No CHF type symptoms. Lipid profile is normal. No recent hospitalization. No change in meds. No hx of CVN. ECHO in 2016 EF of 50%, mod LAE, mild TR, PHTN 41mMHg. Nuc stress test in 2015 was normal.        11-30-18: EKG with Afibb at 103bpm. On Eliquis now for 6 weeks. No nose bleeds so far. Does not really feel that she is in afibb. Pt denies chest pain, dyspnea, dyspnea on exertion, change in exercise capacity, fatigue,  nausea, vomiting, diarrhea, constipation, motor weakness, insomnia, weight loss, syncope, dizziness, lightheadedness,  PND, orthopnea, or claudication. No nitro use. BP and hr are good. No LE discoloration or ulcers. No LE edema. No CHF type symptoms. Lipid profile is normal. No recent hospitalization. No change in meds. No hx of CVN. 12-13-18: s/p successful CVN but EKG today shows afibb with CVR. She felt it yesterday. She has been on sotalol 120mg BID, normal QTC. Pt denies chest pain, dyspnea, dyspnea on exertion, change in exercise capacity, fatigue,  nausea, vomiting, diarrhea, constipation, motor weakness, insomnia, weight loss, syncope, dizziness, lightheadedness, palpitations, PND, orthopnea, or claudication. No nitro use. BP and hr are good. CAD is stable. No LE discoloration or ulcers. No LE edema. No CHF type symptoms. Lipid profile is normal.  She would like to come off of [address] in future. 10-18-19: s/p Tikosyn loading but apparently did not tolerate it. Caused her dizziness/balance issues. She resumed sotalol on her own. She did have ear impaction by ENT. Pt denies chest pain, dyspnea, dyspnea on exertion, change in exercise capacity, fatigue,  nausea, vomiting, diarrhea, constipation, motor weakness, insomnia, weight loss, syncope, dizziness, lightheadedness, palpitations, PND, orthopnea, or claudication. On DOAC. EKG with afib.     9-4-20: doing well. Pt denies chest pain, dyspnea, dyspnea on exertion, change in exercise capacity, fatigue,  nausea, vomiting, diarrhea, constipation, motor weakness, insomnia, weight loss, syncope, dizziness, lightheadedness, palpitations, PND, orthopnea, or claudication. No nitro use. BP and hr are good. CAD is stable. No LE discoloration or ulcers. No LE edema. No CHF type symptoms. Lipid profile is normal. No recent hospitalization. No change in meds. EKG with Afib at 104bpm today. On DOAC, no bleeding issues. Sotalol stopped last office visit. 3/26/2021: Patient has been in the ER few times for nosebleeds. She is on Eliquis 5 mg twice a day. Patient with history of chronic atrial fibrillation. Pt denies chest pain, dyspnea, dyspnea on exertion, change in exercise capacity, fatigue,  nausea, vomiting, diarrhea, constipation, motor weakness, insomnia, weight loss, syncope, dizziness, lightheadedness, palpitations, PND, orthopnea, or claudication. Sotalol was discontinued previously. History of cardioversion. Did not tolerate Tikosyn as well.         Patient Active Problem List   Diagnosis    Persistent atrial fibrillation (HCC)    Atopic rhinitis    Postmenopausal status    Calculus of gallbladder    Calculus of kidney    Benign essential hypertension    Atypical migraine  Muscle pain    Abnormal finding on thyroid function test    Anxiety state    Epistaxis    Inflamed seborrheic keratosis    A-fib ([address]    Ataxia       Past Surgical History:   Procedure Laterality Date    HYSTERECTOMY         Social History     Socioeconomic History    Marital status:      Spouse name: None    Number of children: None    Years of education: None    Highest education level: None   Occupational History    None   Social Needs    Financial resource strain: Not hard at all   Elana-Ezra insecurity     Worry: Never true     Inability: Never true    Transportation needs     Medical: No     Non-medical: No   Tobacco Use    Smoking status: Former Smoker     Packs/day: 0.33     Years: 2.00     Pack years: 0.66     Quit date: 1982     Years since quittin.8    Smokeless tobacco: Never Used   Substance and Sexual Activity    Alcohol use: No    Drug use: No    Sexual activity: None   Lifestyle    Physical activity     Days per week: None     Minutes per session: None    Stress: None   Relationships    Social connections     Talks on phone: None     Gets together: None     Attends Pentecostal service: None     Active member of club or organization: None     Attends meetings of clubs or organizations: None     Relationship status: None    Intimate partner violence     Fear of current or ex partner: None     Emotionally abused: None     Physically abused: None     Forced sexual activity: None   Other Topics Concern    None   Social History Narrative    None       Family History   Problem Relation Age of Onset    Cancer Sister         colon, brain       Current Outpatient Medications   Medication Sig Dispense Refill    apixaban (ELIQUIS) 2.5 MG TABS tablet Take 1 tablet by mouth 2 times daily 180 tablet 3    amLODIPine (NORVASC) 10 MG tablet TAKE 1 TABLET EVERY DAY 90 tablet 1    sertraline (ZOLOFT) 100 MG tablet TAKE 1 TABLET EVERY DAY 90 tablet 1    metoprolol succinate (TOPROL XL) 100 MG extended release tablet Take 1 tablet by mouth daily 90 tablet 3    PREVIDENT 5000 BOOSTER PLUS 1.1 % PSTE BRUSH AND SPIT AT BEDTIME. NO EATING DRINKING  OR RINSING FOR 30 MINUTES  5    Magnesium Oxide 250 MG TABS Take by mouth daily      Cetirizine HCl (ZYRTEC ALLERGY) 10 MG CAPS Take 10 mg by mouth daily as needed       B COMPLEX-C ER PO Take by mouth daily      vitamin D3 (CHOLECALCIFEROL) 400 units TABS tablet Take 400 Units by mouth daily       Ascorbic Acid (VITAMIN C) 1000 MG tablet Take 1,000 mg by mouth daily       Cyanocobalamin (B-12 PO) Take 1,000 mg by mouth daily Pt said she splits a 2500mg dissolving tab in half so approximately 1200mg  Daily. No current facility-administered medications for this visit. Dofetilide, Dust mite extract, and Molds & smuts    Review of Systems:  General ROS: negative  Psychological ROS: negative  Hematological and Lymphatic ROS: No history of blood clots or bleeding disorder. Respiratory ROS: no cough, shortness of breath, or wheezing  Cardiovascular ROS: no chest pain or dyspnea on exertion  Gastrointestinal ROS: no abdominal pain, change in bowel habits, or black or bloody stools  Genito-Urinary ROS: no dysuria, trouble voiding, or hematuria  Musculoskeletal ROS: negative  Neurological ROS: no TIA or stroke symptoms  Dermatological ROS: negative    VITALS:  Blood pressure 110/78, pulse 107, resp. rate 14, weight 137 lb 6.4 oz (62.3 kg), SpO2 96 %, not currently breastfeeding. Body mass index is 25.13 kg/m². Physical Examination:  General appearance - alert, well appearing, and in no distress  Mental status - alert, oriented to person, place, and time  Neck - Neck is supple, no JVD or carotid bruits. No thyromegaly or adenopathy.    Chest - clear to auscultation, no wheezes, rales or rhonchi, symmetric air entry  Heart - normal rate, regular rhythm, normal S1, S2, no murmurs, rubs, clicks or gallops  Abdomen - soft, nontender, nondistended, no masses or organomegaly  Neurological - alert, oriented, normal speech, no focal findings or movement disorder noted  Extremities - peripheral pulses normal, no pedal edema, no clubbing or cyanosis  Skin - normal coloration and turgor, no rashes, no suspicious skin lesions noted      EKG: atrial fibrillation    Orders Placed This Encounter   Procedures    EKG 12 lead       ASSESSMENT:     Diagnosis Orders   1. Persistent atrial fibrillation (HCC)  EKG 12 lead    apixaban (ELIQUIS) 2.5 MG TABS tablet   2. Benign essential hypertension     3. Epistaxis           PLAN:     Patient will need to continue to follow up with you for their general medical care    As always, aggressive risk factor modification is strongly recommended. We should adhere to the JNC VIII guidelines for HTN management and the NCEP ATP III guidelines for LDL-C management. Cardiac diet is always recommended with low fat, cholesterol, calories and sodium. Continue medications at current doses. NO sotalol since she is in afib. Did not tolerate Tikosyn. She does not want any further tx at this time. Cont with Eliquis, but lower to have dose 2.5mg BID due to nose bleeds. Check EKG today and next OV    Patient was advised and encouraged to check blood pressure at home or at a pharmacy, maintain a logbook, and also call us back if blood pressure are above the target ranges or if it is low. Patient clearly understands and agrees to the instructions. We will need to continue to monitor muscle and liver enzymes, BUN, CR, and electrolytes. Details of medical condition explained and patient was warned about adverse consequences of uncontrolled medical conditions and possible side effects of prescribed medications.

## 2021-05-19 DIAGNOSIS — R79.89 ABNORMAL THYROID BLOOD TEST: Primary | ICD-10-CM

## 2021-05-19 DIAGNOSIS — E03.9 HYPOTHYROIDISM, UNSPECIFIED TYPE: ICD-10-CM

## 2021-05-19 DIAGNOSIS — R79.89 ELEVATED TSH: ICD-10-CM

## 2021-05-19 LAB
T4 FREE: 1.03 NG/DL (ref 0.84–1.68)
TSH REFLEX: 3.3 UIU/ML (ref 0.44–3.86)

## 2021-06-15 ENCOUNTER — OFFICE VISIT (OUTPATIENT)
Dept: ENT CLINIC | Age: 81
End: 2021-06-15
Payer: MEDICARE

## 2021-06-15 VITALS
BODY MASS INDEX: 25.21 KG/M2 | WEIGHT: 137 LBS | OXYGEN SATURATION: 96 % | DIASTOLIC BLOOD PRESSURE: 84 MMHG | HEIGHT: 62 IN | HEART RATE: 78 BPM | TEMPERATURE: 96.6 F | SYSTOLIC BLOOD PRESSURE: 135 MMHG

## 2021-06-15 DIAGNOSIS — H61.23 BILATERAL IMPACTED CERUMEN: ICD-10-CM

## 2021-06-15 DIAGNOSIS — H90.3 SENSORINEURAL HEARING LOSS (SNHL) OF BOTH EARS: ICD-10-CM

## 2021-06-15 DIAGNOSIS — H90.0 CONDUCTIVE HEARING LOSS, BILATERAL: Primary | ICD-10-CM

## 2021-06-15 PROCEDURE — 69210 REMOVE IMPACTED EAR WAX UNI: CPT | Performed by: OTOLARYNGOLOGY

## 2021-06-15 PROCEDURE — 99214 OFFICE O/P EST MOD 30 MIN: CPT | Performed by: OTOLARYNGOLOGY

## 2021-06-15 PROCEDURE — 69200 CLEAR OUTER EAR CANAL: CPT | Performed by: OTOLARYNGOLOGY

## 2021-06-15 ASSESSMENT — ENCOUNTER SYMPTOMS
SORE THROAT: 0
RHINORRHEA: 0
SINUS PRESSURE: 0
VOICE CHANGE: 0
SINUS PAIN: 0
TROUBLE SWALLOWING: 0
FACIAL SWELLING: 0

## 2021-06-15 NOTE — PROGRESS NOTES
Subjective:      Patient ID: Elias Jackson is a 80 y.o. female who presents today for:  Chief Complaint   Patient presents with    Other       HPI: Patient has been diagnosed to have sensorineural hearing loss for some time now and has been wearing several hearing aids in the past.  Lately her hearing level has been worse and she was noted to have cerumen in both ears. She also claims that the plastic tip of her right hearing aid got lost and she is uncertain if it is inside the ear canal.  Ring level has gotten worse during the last 2 weeks. Past Medical History:   Diagnosis Date    Allergic rhinitis     Anxiety     Atrial fibrillation (HCC)     Chronic kidney disease     Epistaxis 3/14/2017    Headache     Hypertension     Hypothyroidism     Inflamed seborrheic keratosis      Past Surgical History:   Procedure Laterality Date    HYSTERECTOMY       Social History     Socioeconomic History    Marital status:      Spouse name: Not on file    Number of children: Not on file    Years of education: Not on file    Highest education level: Not on file   Occupational History    Not on file   Tobacco Use    Smoking status: Former Smoker     Packs/day: 0.33     Years: 2.00     Pack years: 0.66     Quit date: 1982     Years since quittin.0    Smokeless tobacco: Never Used   Substance and Sexual Activity    Alcohol use: No    Drug use: No    Sexual activity: Not on file   Other Topics Concern    Not on file   Social History Narrative    Not on file     Social Determinants of Health     Financial Resource Strain: Low Risk     Difficulty of Paying Living Expenses: Not hard at all   Food Insecurity: No Food Insecurity    Worried About Running Out of Food in the Last Year: Never true    Cal of Food in the Last Year: Never true   Transportation Needs: No Transportation Needs    Lack of Transportation (Medical): No    Lack of Transportation (Non-Medical):  No   Physical Activity:  Days of Exercise per Week:     Minutes of Exercise per Session:    Stress:     Feeling of Stress :    Social Connections:     Frequency of Communication with Friends and Family:     Frequency of Social Gatherings with Friends and Family:     Attends Restorationist Services:     Active Member of Clubs or Organizations:     Attends Club or Organization Meetings:     Marital Status:    Intimate Partner Violence:     Fear of Current or Ex-Partner:     Emotionally Abused:     Physically Abused:     Sexually Abused:      Family History   Problem Relation Age of Onset    Cancer Sister         colon, brain     Allergies   Allergen Reactions    Dofetilide Other (See Comments)     VERTIGO    Dust Mite Extract     Molds & Smuts          Review of Systems   HENT: Positive for hearing loss (conductive due to impacted cerumen andforeign body in the right ear canal). Negative for congestion, dental problem, drooling, ear discharge, ear pain, facial swelling, mouth sores, nosebleeds, postnasal drip, rhinorrhea, sinus pressure, sinus pain, sneezing, sore throat, tinnitus, trouble swallowing and voice change. All other systems reviewed and are negative. Objective:   /84 (Site: Right Upper Arm, Position: Sitting, Cuff Size: Large Adult)   Pulse 78   Temp 96.6 °F (35.9 °C) (Infrared)   Ht 5' 2\" (1.575 m)   Wt 137 lb (62.1 kg)   SpO2 96%   BMI 25.06 kg/m²     Physical Exam     Head and neck: Normocephalic no nuchal rigidity no palpable mass no redness engorgement no lymphadenopathy no extrinsic lesion noted    Ears: Excessive impacted cerumen in both ear canal presence of a foreign body (tip of the hearing aid against the drum) in right ear.     Mouth and throat: No extrinsic lesion noted    Assessment:      #1 sensorineural hearing loss bilateral patient wearing hearing aid  #2 conductive hearing loss due to impacted cerumen in both ears and also presence of a foreign body-plastic tip of the hearing aid     Plan:      Procedure:  Impacted Cerumen Removal  Due to significant obstruction of bilateral ear canal(s) there are clinically significant portions of the external auditory canal and/or the tympanic membrane that are not able to be visualized. The patient had impacted cerumen removed from bilateral ear canal(s) in order to visualize the tympanic membrane. This was accomplished using cerumen loop and ear lavage, with no complications. The tympanic membrane was subsequently visualized. Condition:  Stable. Patient tolerated the procedure well. Complications:  None. Procedure:  Removal of foreign body, right ear. With the patient's consent the operative otoscope is used to visualize the right ear canal through an otic speculum where a foreign body is noted (tip of previous hearing aid). This is removed with a cerumen loop and ear lavage. The underlying ear canal is unremarkable. On inspection of the tympanic membrane this is noted to be unremarkable. Condition:  Stable. The patient tolerated the procedure well without complication. The patient is advised to avoid water exposure to the ear and to call for significant pain, drainage, bleeding, or change in hearing. Completed communication with patient, all questions answered. Patient verbalizes an understanding of post visit instructions. Complications:  None. Advised to see audiologist for evaluation for another hearing aid. Return to the office as needed.       Celia Rodriguez MD

## 2021-08-12 ENCOUNTER — OFFICE VISIT (OUTPATIENT)
Dept: FAMILY MEDICINE CLINIC | Age: 81
End: 2021-08-12
Payer: MEDICARE

## 2021-08-12 VITALS
SYSTOLIC BLOOD PRESSURE: 114 MMHG | DIASTOLIC BLOOD PRESSURE: 78 MMHG | WEIGHT: 136 LBS | HEART RATE: 82 BPM | BODY MASS INDEX: 25.03 KG/M2 | HEIGHT: 62 IN | OXYGEN SATURATION: 96 %

## 2021-08-12 DIAGNOSIS — M25.559 HIP PAIN: Primary | ICD-10-CM

## 2021-08-12 DIAGNOSIS — Z91.81 AT HIGH RISK FOR FALLS: ICD-10-CM

## 2021-08-12 PROCEDURE — G8420 CALC BMI NORM PARAMETERS: HCPCS | Performed by: NURSE PRACTITIONER

## 2021-08-12 PROCEDURE — G8399 PT W/DXA RESULTS DOCUMENT: HCPCS | Performed by: NURSE PRACTITIONER

## 2021-08-12 PROCEDURE — 1090F PRES/ABSN URINE INCON ASSESS: CPT | Performed by: NURSE PRACTITIONER

## 2021-08-12 PROCEDURE — 1036F TOBACCO NON-USER: CPT | Performed by: NURSE PRACTITIONER

## 2021-08-12 PROCEDURE — 1123F ACP DISCUSS/DSCN MKR DOCD: CPT | Performed by: NURSE PRACTITIONER

## 2021-08-12 PROCEDURE — G8427 DOCREV CUR MEDS BY ELIG CLIN: HCPCS | Performed by: NURSE PRACTITIONER

## 2021-08-12 PROCEDURE — 4040F PNEUMOC VAC/ADMIN/RCVD: CPT | Performed by: NURSE PRACTITIONER

## 2021-08-12 PROCEDURE — 99213 OFFICE O/P EST LOW 20 MIN: CPT | Performed by: NURSE PRACTITIONER

## 2021-08-12 ASSESSMENT — PATIENT HEALTH QUESTIONNAIRE - PHQ9
SUM OF ALL RESPONSES TO PHQ QUESTIONS 1-9: 0
SUM OF ALL RESPONSES TO PHQ QUESTIONS 1-9: 0
2. FEELING DOWN, DEPRESSED OR HOPELESS: 0
SUM OF ALL RESPONSES TO PHQ9 QUESTIONS 1 & 2: 0
1. LITTLE INTEREST OR PLEASURE IN DOING THINGS: 0
SUM OF ALL RESPONSES TO PHQ QUESTIONS 1-9: 0

## 2021-08-12 ASSESSMENT — ENCOUNTER SYMPTOMS
COUGH: 0
SHORTNESS OF BREATH: 0

## 2021-08-12 NOTE — PROGRESS NOTES
Subjective  Chief Complaint   Patient presents with    Hip Pain     pt state right side hip pain and lump on right thigh. pt state pain has been going on for a few months. Hip Pain   Incident onset: fell at one point. not sure when it was per patient. The incident occurred at home. The injury mechanism was a fall. The pain is present in the right hip. The quality of the pain is described as shooting. The pain is moderate. The pain has been intermittent since onset. The symptoms are aggravated by movement. She has tried nothing for the symptoms.      Patient Active Problem List    Diagnosis Date Noted    Conductive hearing loss, bilateral 06/15/2021    Bilateral impacted cerumen 06/15/2021    Sensorineural hearing loss (SNHL) of both ears 06/15/2021    Ataxia 12/20/2018    A-fib ([facility] [address] 12/17/2018    Inflamed seborrheic keratosis     Epistaxis 03/14/2017    Atopic rhinitis 04/08/2016    Calculus of gallbladder 04/08/2016    Calculus of kidney 04/08/2016    Benign essential hypertension 04/08/2016    Atypical migraine 04/08/2016    Muscle pain 04/08/2016    Anxiety state 04/08/2016    Persistent atrial fibrillation ([address] 01/04/2016    Abnormal finding on thyroid function test 07/13/2005    Postmenopausal status 05/27/2005     Past Medical History:   Diagnosis Date    Allergic rhinitis     Anxiety     Atrial fibrillation ([address]     Chronic kidney disease     Epistaxis 3/14/2017    Headache     Hypertension     Hypothyroidism     Inflamed seborrheic keratosis      Past Surgical History:   Procedure Laterality Date    HYSTERECTOMY       Family History   Problem Relation Age of Onset    Cancer Sister         colon, brain     Social History     Socioeconomic History    Marital status:      Spouse name: None    Number of children: None    Years of education: None    Highest education level: None   Occupational History    None   Tobacco Use    Smoking status: Former Smoker Packs/day: 0.33     Years: 2.00     Pack years: 0.66     Quit date: 1982     Years since quittin.1    Smokeless tobacco: Never Used   Substance and Sexual Activity    Alcohol use: No    Drug use: No    Sexual activity: None   Other Topics Concern    None   Social History Narrative    None     Social Determinants of Health     Financial Resource Strain: Low Risk     Difficulty of Paying Living Expenses: Not hard at all   Food Insecurity: No Food Insecurity    Worried About Running Out of Food in the Last Year: Never true    Cal of Food in the Last Year: Never true   Transportation Needs: No Transportation Needs    Lack of Transportation (Medical): No    Lack of Transportation (Non-Medical): No   Physical Activity:     Days of Exercise per Week:     Minutes of Exercise per Session:    Stress:     Feeling of Stress :    Social Connections:     Frequency of Communication with Friends and Family:     Frequency of Social Gatherings with Friends and Family:     Attends Sabianism Services:     Active Member of Clubs or Organizations:     Attends Club or Organization Meetings:     Marital Status:    Intimate Partner Violence:     Fear of Current or Ex-Partner:     Emotionally Abused:     Physically Abused:     Sexually Abused:      Current Outpatient Medications on File Prior to Visit   Medication Sig Dispense Refill    apixaban (ELIQUIS) 2.5 MG TABS tablet Take 1 tablet by mouth 2 times daily 180 tablet 3    amLODIPine (NORVASC) 10 MG tablet TAKE 1 TABLET EVERY DAY 90 tablet 1    sertraline (ZOLOFT) 100 MG tablet TAKE 1 TABLET EVERY DAY 90 tablet 1    metoprolol succinate (TOPROL XL) 100 MG extended release tablet Take 1 tablet by mouth daily 90 tablet 3    PREVIDENT 5000 BOOSTER PLUS 1.1 % PSTE BRUSH AND SPIT AT BEDTIME.  NO EATING DRINKING  OR RINSING FOR 30 MINUTES  5    Magnesium Oxide 250 MG TABS Take by mouth daily      Cetirizine HCl (ZYRTEC ALLERGY) 10 MG CAPS Take 10 mg by mouth daily as needed       B COMPLEX-C ER PO Take by mouth daily      vitamin D3 (CHOLECALCIFEROL) 400 units TABS tablet Take 400 Units by mouth daily       Ascorbic Acid (VITAMIN C) 1000 MG tablet Take 1,000 mg by mouth daily       Cyanocobalamin (B-12 PO) Take 1,000 mg by mouth daily Pt said she splits a 2500mg dissolving tab in half so approximately 1200mg  Daily. No current facility-administered medications on file prior to visit. Allergies   Allergen Reactions    Dofetilide Other (See Comments)     VERTIGO    Dust Mite Extract     Molds & Smuts        Review of Systems   Constitutional: Negative for fatigue. Respiratory: Negative for cough and shortness of breath. Cardiovascular: Negative for chest pain. Musculoskeletal: Positive for arthralgias. Objective  Vitals:    08/12/21 1055   BP: 114/78   Pulse: 82   SpO2: 96%   Weight: 136 lb (61.7 kg)   Height: 5' 2\" (1.575 m)     Physical Exam  Vitals and nursing note reviewed. Constitutional:       Appearance: Normal appearance. She is normal weight. HENT:      Head: Normocephalic. Nose: Nose normal.      Mouth/Throat:      Mouth: Mucous membranes are moist.      Pharynx: Oropharynx is clear. Eyes:      Extraocular Movements: Extraocular movements intact. Conjunctiva/sclera: Conjunctivae normal.      Pupils: Pupils are equal, round, and reactive to light. Cardiovascular:      Rate and Rhythm: Normal rate and regular rhythm. Pulses: Normal pulses. Heart sounds: Normal heart sounds. Pulmonary:      Effort: Pulmonary effort is normal.      Breath sounds: Normal breath sounds. Musculoskeletal:         General: Normal range of motion. Right hip: Tenderness present. No bony tenderness. Normal range of motion. Normal strength. Skin:     General: Skin is warm. Neurological:      General: No focal deficit present. Mental Status: She is alert and oriented to person, place, and time.  Mental status is at baseline. Psychiatric:         Mood and Affect: Mood normal.         Behavior: Behavior normal.         Thought Content: Thought content normal.         Judgment: Judgment normal.          Assessment & Plan     Diagnosis Orders   1. Hip pain  XR HIP RIGHT (2-3 VIEWS)   2. At high risk for falls         Orders Placed This Encounter   Procedures    XR HIP RIGHT (2-3 VIEWS)     Standing Status:   Future     Standing Expiration Date:   8/12/2022     Order Specific Question:   Reason for exam:     Answer:   hip pain       No orders of the defined types were placed in this encounter. Side effects, adverse effects of the medication prescribed today, as well as treatment plan/ rationale and result expectations have been discussed with the patient who expresses understanding and desires to proceed. Close follow up to evaluate treatment results and for coordination of care. I have reviewed the patient's medical history in detail and updated the computerized patient record. As always, patient is advised that if symptoms worsen in any way they will proceed to the nearest emergency room. We will fu after NICOLETTE Garcia CNP      On the basis of positive falls risk screening, assessment and plan is as follows: home safety tips provided.

## 2021-08-13 DIAGNOSIS — R93.89 ABNORMAL X-RAY: ICD-10-CM

## 2021-08-13 DIAGNOSIS — M25.559 HIP PAIN: Primary | ICD-10-CM

## 2021-08-17 ENCOUNTER — TELEPHONE (OUTPATIENT)
Dept: FAMILY MEDICINE CLINIC | Age: 81
End: 2021-08-17

## 2021-08-17 DIAGNOSIS — M25.559 HIP PAIN: Primary | ICD-10-CM

## 2021-08-17 DIAGNOSIS — R93.89 ABNORMAL X-RAY: ICD-10-CM

## 2021-08-17 NOTE — TELEPHONE ENCOUNTER
Pt came into office with questions regarding the follow up imaging ordered for her hip. From the xray results:   Ivelisse Oneal   8/12/2021  3:02 PM EDT       Ok to order Ct for Lt hip. NICOLETTE Ware - CNP   8/12/2021  2:52 PM EDT       Right hip xray looked ok but left hip showed an area of irregularity that should be further evaluated with a CT to figure out what it is. Ok to order? However a CT of the Right Hip was ordered for patient. Please correct to a CT of the left hip.

## 2021-08-30 ENCOUNTER — HOSPITAL ENCOUNTER (OUTPATIENT)
Dept: CT IMAGING | Age: 81
Discharge: HOME | End: 2021-09-01
Payer: MEDICARE

## 2021-08-30 DIAGNOSIS — M25.559 HIP PAIN: ICD-10-CM

## 2021-08-30 DIAGNOSIS — R93.89 ABNORMAL X-RAY: ICD-10-CM

## 2021-08-30 PROCEDURE — 73700 CT LOWER EXTREMITY W/O DYE: CPT

## 2021-11-02 ENCOUNTER — OFFICE VISIT (OUTPATIENT)
Dept: FAMILY MEDICINE CLINIC | Age: 81
End: 2021-11-02
Payer: MEDICARE

## 2021-11-02 VITALS
HEART RATE: 80 BPM | WEIGHT: 136 LBS | HEIGHT: 62 IN | DIASTOLIC BLOOD PRESSURE: 82 MMHG | BODY MASS INDEX: 25.03 KG/M2 | SYSTOLIC BLOOD PRESSURE: 132 MMHG | OXYGEN SATURATION: 97 %

## 2021-11-02 DIAGNOSIS — I10 ESSENTIAL HYPERTENSION: ICD-10-CM

## 2021-11-02 DIAGNOSIS — M25.559 HIP PAIN: Primary | ICD-10-CM

## 2021-11-02 DIAGNOSIS — R22.41 MASS OF RIGHT LOWER EXTREMITY: ICD-10-CM

## 2021-11-02 PROCEDURE — 4040F PNEUMOC VAC/ADMIN/RCVD: CPT | Performed by: NURSE PRACTITIONER

## 2021-11-02 PROCEDURE — G8427 DOCREV CUR MEDS BY ELIG CLIN: HCPCS | Performed by: NURSE PRACTITIONER

## 2021-11-02 PROCEDURE — 99213 OFFICE O/P EST LOW 20 MIN: CPT | Performed by: NURSE PRACTITIONER

## 2021-11-02 PROCEDURE — G8399 PT W/DXA RESULTS DOCUMENT: HCPCS | Performed by: NURSE PRACTITIONER

## 2021-11-02 PROCEDURE — G8484 FLU IMMUNIZE NO ADMIN: HCPCS | Performed by: NURSE PRACTITIONER

## 2021-11-02 PROCEDURE — G8420 CALC BMI NORM PARAMETERS: HCPCS | Performed by: NURSE PRACTITIONER

## 2021-11-02 PROCEDURE — 1036F TOBACCO NON-USER: CPT | Performed by: NURSE PRACTITIONER

## 2021-11-02 PROCEDURE — 1090F PRES/ABSN URINE INCON ASSESS: CPT | Performed by: NURSE PRACTITIONER

## 2021-11-02 PROCEDURE — 1123F ACP DISCUSS/DSCN MKR DOCD: CPT | Performed by: NURSE PRACTITIONER

## 2021-11-02 RX ORDER — METOPROLOL SUCCINATE 100 MG/1
100 TABLET, EXTENDED RELEASE ORAL DAILY
Qty: 90 TABLET | Refills: 3 | Status: SHIPPED | OUTPATIENT
Start: 2021-11-02 | End: 2022-08-23

## 2021-11-02 ASSESSMENT — ENCOUNTER SYMPTOMS: RESPIRATORY NEGATIVE: 1

## 2021-11-02 NOTE — PROGRESS NOTES
Subjective  Chief Complaint   Patient presents with    Hip Pain     pt states right hip/leg x 5 months, follow up from [facility]     Leg Pain       HPI     Still struggling with right hip. Having pain in the area with a \"bump\" in the thigh area. ROM is \"ok\"  Does not have trouble walking   Does not take anything for it. Patient Active Problem List    Diagnosis Date Noted    Conductive hearing loss, bilateral 06/15/2021    Bilateral impacted cerumen 06/15/2021    Sensorineural hearing loss (SNHL) of both ears 06/15/2021    Ataxia 2018    A-fib ([address] 2018    Inflamed seborrheic keratosis     Epistaxis 2017    Atopic rhinitis 2016    Calculus of gallbladder 2016    Calculus of kidney 2016    Benign essential hypertension 2016    Atypical migraine 2016    Muscle pain 2016    Anxiety state 2016    Persistent atrial fibrillation ([address] 2016    Abnormal finding on thyroid function test 2005    Postmenopausal status 2005     Past Medical History:   Diagnosis Date    Allergic rhinitis     Anxiety     Atrial fibrillation ([address]     Chronic kidney disease     Epistaxis 3/14/2017    Headache     Hypertension     Hypothyroidism     Inflamed seborrheic keratosis      Past Surgical History:   Procedure Laterality Date    HYSTERECTOMY       Family History   Problem Relation Age of Onset    Cancer Sister         colon, brain     Social History     Socioeconomic History    Marital status:       Spouse name: None    Number of children: None    Years of education: None    Highest education level: None   Occupational History    None   Tobacco Use    Smoking status: Former Smoker     Packs/day: 0.33     Years: 2.00     Pack years: 0.66     Quit date: 1982     Years since quittin.4    Smokeless tobacco: Never Used   Substance and Sexual Activity    Alcohol use: No    Drug use: No    Sexual activity: None Other Topics Concern    None   Social History Narrative    None     Social Determinants of Health     Financial Resource Strain: Low Risk     Difficulty of Paying Living Expenses: Not hard at all   Food Insecurity: No Food Insecurity    Worried About Running Out of Food in the Last Year: Never true    Cal of Food in the Last Year: Never true   Transportation Needs: No Transportation Needs    Lack of Transportation (Medical): No    Lack of Transportation (Non-Medical): No   Physical Activity:     Days of Exercise per Week:     Minutes of Exercise per Session:    Stress:     Feeling of Stress :    Social Connections:     Frequency of Communication with Friends and Family:     Frequency of Social Gatherings with Friends and Family:     Attends Voodoo Services:     Active Member of Clubs or Organizations:     Attends Club or Organization Meetings:     Marital Status:    Intimate Partner Violence:     Fear of Current or Ex-Partner:     Emotionally Abused:     Physically Abused:     Sexually Abused:      Current Outpatient Medications on File Prior to Visit   Medication Sig Dispense Refill    apixaban (ELIQUIS) 2.5 MG TABS tablet Take 1 tablet by mouth 2 times daily 180 tablet 3    amLODIPine (NORVASC) 10 MG tablet TAKE 1 TABLET EVERY DAY 90 tablet 1    sertraline (ZOLOFT) 100 MG tablet TAKE 1 TABLET EVERY DAY 90 tablet 1    PREVIDENT 5000 BOOSTER PLUS 1.1 % PSTE BRUSH AND SPIT AT BEDTIME.  NO EATING DRINKING  OR RINSING FOR 30 MINUTES  5    Magnesium Oxide 250 MG TABS Take by mouth daily      Cetirizine HCl (ZYRTEC ALLERGY) 10 MG CAPS Take 10 mg by mouth daily as needed       B COMPLEX-C ER PO Take by mouth daily      vitamin D3 (CHOLECALCIFEROL) 400 units TABS tablet Take 400 Units by mouth daily       Ascorbic Acid (VITAMIN C) 1000 MG tablet Take 1,000 mg by mouth daily       Cyanocobalamin (B-12 PO) Take 1,000 mg by mouth daily Pt said she splits a 2500mg dissolving tab in half so approximately 1200mg  Daily. No current facility-administered medications on file prior to visit. Allergies   Allergen Reactions    Dofetilide Other (See Comments)     VERTIGO    Dust Mite Extract     Molds & Smuts        Review of Systems   Constitutional: Negative. HENT: Negative. Respiratory: Negative. Musculoskeletal:        Right leg bump   Skin: Negative. Neurological: Negative. Psychiatric/Behavioral: Negative. Objective  Vitals:    11/02/21 1328   BP: 132/82   Pulse: 80   SpO2: 97%   Weight: 136 lb (61.7 kg)   Height: 5' 2\" (1.575 m)     Physical Exam  Vitals and nursing note reviewed. Constitutional:       Appearance: Normal appearance. HENT:      Head: Normocephalic. Cardiovascular:      Rate and Rhythm: Normal rate. Rhythm irregular. Heart sounds: Normal heart sounds. Comments: A-fib  Pulmonary:      Effort: Pulmonary effort is normal.      Breath sounds: Normal breath sounds. Musculoskeletal:         General: Normal range of motion. Comments: Hard lump on right greater trochanter   Skin:     General: Skin is warm. Neurological:      General: No focal deficit present. Mental Status: She is alert and oriented to person, place, and time. Psychiatric:         Mood and Affect: Mood normal.         Behavior: Behavior normal.         Thought Content: Thought content normal.         Judgment: Judgment normal.         Assessment & Plan     Diagnosis Orders   1. Hip pain  CT HIP RIGHT WO CONTRAST   2. Mass of right lower extremity  CT HIP RIGHT WO CONTRAST   3.  Essential hypertension  metoprolol succinate (TOPROL XL) 100 MG extended release tablet       Orders Placed This Encounter   Procedures    CT HIP RIGHT WO CONTRAST     Standing Status:   Future     Standing Expiration Date:   11/2/2022     Order Specific Question:   Reason for exam:     Answer:   hip pain, mass of right leg       Orders Placed This Encounter   Medications    metoprolol succinate (TOPROL XL) 100 MG extended release tablet     Sig: Take 1 tablet by mouth daily     Dispense:  90 tablet     Refill:  3       NICOLETTE Roberts CNP    Side effects, adverse effects of the medication prescribed today, as well as treatment plan/ rationale and result expectations have been discussed with the patient who expresses understanding and desires to proceed. Close follow up to evaluate treatment results and for coordination of care. I have reviewed the patient's medical history in detail and updated the computerized patient record. As always, patient is advised that if symptoms worsen in any way they will proceed to the nearest emergency room. We will fu after imaging.

## 2021-11-16 ENCOUNTER — HOSPITAL ENCOUNTER (OUTPATIENT)
Dept: CT IMAGING | Age: 81
Discharge: HOME | End: 2021-11-18
Payer: MEDICARE

## 2021-11-16 DIAGNOSIS — R22.41 MASS OF RIGHT LOWER EXTREMITY: ICD-10-CM

## 2021-11-16 DIAGNOSIS — M25.559 HIP PAIN: ICD-10-CM

## 2021-11-16 PROCEDURE — 73700 CT LOWER EXTREMITY W/O DYE: CPT

## 2021-12-30 DIAGNOSIS — F41.9 ANXIETY: ICD-10-CM

## 2021-12-30 RX ORDER — SERTRALINE HYDROCHLORIDE 100 MG/1
TABLET, FILM COATED ORAL
Qty: 90 TABLET | Refills: 1 | Status: SHIPPED | OUTPATIENT
Start: 2021-12-30 | End: 2022-05-12

## 2022-01-18 ENCOUNTER — VIRTUAL VISIT (OUTPATIENT)
Dept: FAMILY MEDICINE CLINIC | Age: 82
End: 2022-01-18
Payer: MEDICARE

## 2022-01-18 DIAGNOSIS — R53.83 FATIGUE, UNSPECIFIED TYPE: ICD-10-CM

## 2022-01-18 DIAGNOSIS — I10 ESSENTIAL HYPERTENSION: ICD-10-CM

## 2022-01-18 DIAGNOSIS — E03.9 HYPOTHYROIDISM, UNSPECIFIED TYPE: ICD-10-CM

## 2022-01-18 DIAGNOSIS — Z00.00 ROUTINE GENERAL MEDICAL EXAMINATION AT A HEALTH CARE FACILITY: Primary | ICD-10-CM

## 2022-01-18 PROCEDURE — G8484 FLU IMMUNIZE NO ADMIN: HCPCS | Performed by: NURSE PRACTITIONER

## 2022-01-18 PROCEDURE — 1123F ACP DISCUSS/DSCN MKR DOCD: CPT | Performed by: NURSE PRACTITIONER

## 2022-01-18 PROCEDURE — G0439 PPPS, SUBSEQ VISIT: HCPCS | Performed by: NURSE PRACTITIONER

## 2022-01-18 PROCEDURE — 4040F PNEUMOC VAC/ADMIN/RCVD: CPT | Performed by: NURSE PRACTITIONER

## 2022-01-18 SDOH — ECONOMIC STABILITY: FOOD INSECURITY: WITHIN THE PAST 12 MONTHS, THE FOOD YOU BOUGHT JUST DIDN'T LAST AND YOU DIDN'T HAVE MONEY TO GET MORE.: NEVER TRUE

## 2022-01-18 SDOH — ECONOMIC STABILITY: FOOD INSECURITY: WITHIN THE PAST 12 MONTHS, YOU WORRIED THAT YOUR FOOD WOULD RUN OUT BEFORE YOU GOT MONEY TO BUY MORE.: NEVER TRUE

## 2022-01-18 ASSESSMENT — PATIENT HEALTH QUESTIONNAIRE - PHQ9
SUM OF ALL RESPONSES TO PHQ QUESTIONS 1-9: 0
2. FEELING DOWN, DEPRESSED OR HOPELESS: 0
SUM OF ALL RESPONSES TO PHQ9 QUESTIONS 1 & 2: 0
SUM OF ALL RESPONSES TO PHQ QUESTIONS 1-9: 0
SUM OF ALL RESPONSES TO PHQ QUESTIONS 1-9: 0
1. LITTLE INTEREST OR PLEASURE IN DOING THINGS: 0
SUM OF ALL RESPONSES TO PHQ QUESTIONS 1-9: 0

## 2022-01-18 ASSESSMENT — SOCIAL DETERMINANTS OF HEALTH (SDOH): HOW HARD IS IT FOR YOU TO PAY FOR THE VERY BASICS LIKE FOOD, HOUSING, MEDICAL CARE, AND HEATING?: NOT HARD AT ALL

## 2022-01-18 NOTE — PROGRESS NOTES
Medicare Annual Wellness Visit  Are Name: Leonora Anna Date: 2022   MRN: [id number] Sex: Female   Age: [de-identified] y.o. Ethnicity: Non- / Non    : 1940 Race: White (non-)      Reynaldo Delatorre is here for Medicare AWV    Screenings for behavioral, psychosocial and functional/safety risks, and cognitive dysfunction are all negative except as indicated below. These results, as well as other patient data from the [address] form, are documented in Flowsheets linked to this Encounter. Has been feeling well. No questions or concerns todya    Allergies   Allergen Reactions    Dofetilide Other (See Comments)     VERTIGO    Dust Mite Extract     Molds & Smuts        Prior to Visit Medications    Medication Sig Taking? Authorizing Provider   sertraline (ZOLOFT) 100 MG tablet TAKE 1 TABLET EVERY DAY Yes NICOLETTE Dominguez Mai, CNP   metoprolol succinate (TOPROL XL) 100 MG extended release tablet Take 1 tablet by mouth daily Yes NICOLETTE Dominguez Mai, CNP   apixaban (ELIQUIS) 2.5 MG TABS tablet Take 1 tablet by mouth 2 times daily Yes Raj Gore, DO   amLODIPine (NORVASC) 10 MG tablet TAKE 1 TABLET EVERY DAY Yes NICOLETTE Dominguez Mai, CNP   PREVIDENT 5000 BOOSTER PLUS 1.1 % PSTE BRUSH AND SPIT AT BEDTIME. NO EATING DRINKING  OR RINSING FOR 30 MINUTES Yes Historical Provider, MD   Magnesium Oxide 250 MG TABS Take by mouth daily Yes Historical Provider, MD   Cetirizine HCl (ZYRTEC ALLERGY) 10 MG CAPS Take 10 mg by mouth daily as needed  Yes Historical Provider, MD   B COMPLEX-C ER PO Take by mouth daily Yes Historical Provider, MD   vitamin D3 (CHOLECALCIFEROL) 400 units TABS tablet Take 400 Units by mouth daily  Yes Historical Provider, MD   Ascorbic Acid (VITAMIN C) 1000 MG tablet Take 1,000 mg by mouth daily  Yes Historical Provider, MD   Cyanocobalamin (B-12 PO) Take 1,000 mg by mouth daily Pt said she splits a 2500mg dissolving tab in half so approximately 1200mg  Daily. Yes Historical Provider, MD       Past Medical History:   Diagnosis Date    Allergic rhinitis     Anxiety     Atrial fibrillation ([address]     Chronic kidney disease     Epistaxis 3/14/2017    Headache     Hypertension     Hypothyroidism     Inflamed seborrheic keratosis        Past Surgical History:   Procedure Laterality Date    HYSTERECTOMY         Family History   Problem Relation Age of Onset    Cancer Sister         colon, brain       CareTeam (Including outside providers/suppliers regularly involved in providing care):   Patient Care Team:  NICOLETTE Soria CNP as PCP - General (Nurse Practitioner)  NICOLETTE Soria CNP as PCP - [facility] Empaneled Provider    Wt Readings from Last 3 Encounters:   11/02/21 136 lb (61.7 kg)   08/12/21 136 lb (61.7 kg)   06/15/21 137 lb (62.1 kg)      Patient-Reported Vitals 1/18/2022   Patient-Reported Weight 130 lb   Patient-Reported Height 5 2   Patient-Reported Systolic 588   Patient-Reported Diastolic 75   Patient-Reported Pulse 97      There is no height or weight on file to calculate BMI. Based upon direct observation of the patient, evaluation of cognition reveals recent and remote memory intact. Patient's complete Health Risk Assessment and screening values have been reviewed and are found in Flowsheets. The following problems were reviewed today and where indicated follow up appointments were made and/or referrals ordered. Positive Risk Factor Screenings with Interventions:            General Health and ACP:  General  In general, how would you say your health is?: Excellent  In the past 7 days, have you experienced any of the following?  New or Increased Pain, New or Increased Fatigue, Loneliness, Social Isolation, Stress or Anger?: None of These  Do you get the social and emotional support that you need?: Yes  Do you have a Living Will?: Yes  Advance Directives     Power of  Living Will ACP-Advance Directive ACP-Power of     Not on File Not on File Not on File Not on File      General Health Risk Interventions:  · will bring copy for us.       Hearing/Vision:  No exam data present  Hearing/Vision  Do you or your family notice any trouble with your hearing that hasn't been managed with hearing aids?: (!) Yes (has hearing aids)  Do you have difficulty driving, watching TV, or doing any of your daily activities because of your eyesight?: (!) Yes (caterats surgery)  Have you had an eye exam within the past year?: Yes  Hearing/Vision Interventions:  · Hearing concerns:  patient declines any further evaluation/treatment for hearing issues  · Vision concerns:  just saw eye dr        Personalized Preventive Plan   Current Health Maintenance Status  Immunization History   Administered Date(s) Administered    COVID-19, Phil Trevizo, Primary or Immunocompromised, PF, 100mcg/0.5mL 01/28/2021, 02/25/2021    DT (pediatric) 05/27/2005    Influenza Vaccine, unspecified formulation 11/01/2008    Influenza Virus Vaccine 11/01/2008    Influenza, Quadv, adjuvanted, 65 yrs +, IM, PF (Fluad) 11/09/2020    Pneumococcal Conjugate 13-valent (Casie Balsam) 11/14/2016    Pneumococcal Polysaccharide ([id number]) 06/01/2007    Td, unspecified formulation 05/27/2005        Health Maintenance   Topic Date Due    Shingles Vaccine (1 of 2) Never done    DTaP/Tdap/Td vaccine (2 - Tdap) 05/27/2015    COVID-19 Vaccine (3 - Booster for Villarreal Santohsh series) 08/25/2021    Annual Wellness Visit (AWV)  12/18/2021    Potassium monitoring  01/14/2022    Creatinine monitoring  01/14/2022    Depression Screen  08/12/2022    Colon cancer screen colonoscopy  08/17/2025    DEXA (modify frequency per FRAX score)  Completed    Flu vaccine  Completed    Pneumococcal 65+ years Vaccine  Completed    Hepatitis A vaccine  Aged Out    Hepatitis B vaccine  Aged Out    Hib vaccine  Aged Out    Meningococcal (ACWY) vaccine  Aged Out     Recommendations for Engiver Due: see orders and patient instructions/AVS.  . Recommended screening schedule for the next 5-10 years is provided to the patient in written form: see Patient Instructions/AVS.    There are no diagnoses linked to this encounter. Beatriz Dunlap is a [de-identified] y.o. female being evaluated by a Virtual Visit (phone) encounter to address concerns as mentioned above. A caregiver was present when appropriate. Due to this being a TeleHealth encounter (During WQUNX-[facility] public [facility] emergency), evaluation of the following organ systems was limited: Vitals/Constitutional/EENT/Resp/CV/GI//MS/Neuro/Skin/Heme-Lymph-Imm. Pursuant to the emergency declaration under the [address] [place] [address], [address] [place] [address] authority and the [employer] Act, this Virtual Visit was conducted with patient's (and/or legal guardian's) consent, to reduce the patient's risk of exposure to COVID-19 and provide necessary medical care. The patient (and/or legal guardian) has also been advised to contact this office for worsening conditions or problems, and seek emergency medical treatment and/or call 911 if deemed necessary. Patient identification was verified at the start of the visit: Yes    Services were provided through phone to substitute for in-person clinic visit. Patient and provider were located at their individual homes. --NICOLETTE Cespedes CNP on 1/18/2022 at 3:02 PM    An electronic signature was used to authenticate this note.

## 2022-01-18 NOTE — PATIENT INSTRUCTIONS
Personalized Preventive Plan for Annelise Randle - 1/18/2022  Medicare offers a range of preventive health benefits. Some of the tests and screenings are paid in full while other may be subject to a deductible, co-insurance, and/or copay. Some of these benefits include a comprehensive review of your medical history including lifestyle, illnesses that may run in your family, and various assessments and screenings as appropriate. After reviewing your medical record and screening and assessments performed today your provider may have ordered immunizations, labs, imaging, and/or referrals for you. A list of these orders (if applicable) as well as your Preventive Care list are included within your After Visit Summary for your review. Other Preventive Recommendations:    · A preventive eye exam performed by an eye specialist is recommended every 1-2 years to screen for glaucoma; cataracts, macular degeneration, and other eye disorders. · A preventive dental visit is recommended every 6 months. · Try to get at least 150 minutes of exercise per week or 10,000 steps per day on a pedometer . · Order or download the FREE \"Exercise & Physical Activity: Your Everyday Guide\" from [employer]. Call 2-585.194.9221 or search [employer] online. · You need 7505-8894 mg of calcium and 5975-6270 IU of vitamin D per day. It is possible to meet your calcium requirement with diet alone, but a vitamin D supplement is usually necessary to meet this goal.  · When exposed to the sun, use a sunscreen that protects against both UVA and UVB radiation with an SPF of 30 or greater. Reapply every 2 to 3 hours or after sweating, drying off with a towel, or swimming. · Always wear a seat belt when traveling in a car. Always wear a helmet when riding a bicycle or motorcycle.

## 2022-02-01 ENCOUNTER — OFFICE VISIT (OUTPATIENT)
Dept: FAMILY MEDICINE CLINIC | Age: 82
End: 2022-02-01
Payer: MEDICARE

## 2022-02-01 VITALS
HEART RATE: 63 BPM | WEIGHT: 138 LBS | BODY MASS INDEX: 25.4 KG/M2 | SYSTOLIC BLOOD PRESSURE: 120 MMHG | DIASTOLIC BLOOD PRESSURE: 82 MMHG | OXYGEN SATURATION: 98 % | TEMPERATURE: 98.5 F | HEIGHT: 62 IN

## 2022-02-01 DIAGNOSIS — L30.9 DERMATITIS: Primary | ICD-10-CM

## 2022-02-01 PROCEDURE — 99213 OFFICE O/P EST LOW 20 MIN: CPT

## 2022-02-01 PROCEDURE — 1123F ACP DISCUSS/DSCN MKR DOCD: CPT

## 2022-02-01 PROCEDURE — G8484 FLU IMMUNIZE NO ADMIN: HCPCS

## 2022-02-01 PROCEDURE — G8417 CALC BMI ABV UP PARAM F/U: HCPCS

## 2022-02-01 PROCEDURE — G8399 PT W/DXA RESULTS DOCUMENT: HCPCS

## 2022-02-01 PROCEDURE — G8427 DOCREV CUR MEDS BY ELIG CLIN: HCPCS

## 2022-02-01 PROCEDURE — 1036F TOBACCO NON-USER: CPT

## 2022-02-01 PROCEDURE — 1090F PRES/ABSN URINE INCON ASSESS: CPT

## 2022-02-01 PROCEDURE — 4040F PNEUMOC VAC/ADMIN/RCVD: CPT

## 2022-02-01 ASSESSMENT — ENCOUNTER SYMPTOMS
EYE ITCHING: 0
SHORTNESS OF BREATH: 0
EYE DISCHARGE: 0
COLOR CHANGE: 0
WHEEZING: 0
EYE PAIN: 0
CHEST TIGHTNESS: 0
COUGH: 0

## 2022-02-01 NOTE — PROGRESS NOTES
[facility] Encounter  CHIEF COMPLAINT       Chief Complaint   Patient presents with    Rash     been about 3 days. Itches some, sensitive when touched. HISTORY OF PRESENT ILLNESS   Terrance Munoz is a [de-identified] y.o. female who presents with:  HPI  Patient reports history of itching rash to bilateral arms starting 3 days ago. Rash is burning  REVIEW OF SYSTEMS     Review of Systems   Constitutional: Negative for chills, fatigue and fever. Eyes: Negative for pain, discharge and itching. Respiratory: Negative for cough, chest tightness, shortness of breath and wheezing. Cardiovascular: Negative for chest pain and palpitations. Endocrine: Negative for cold intolerance, heat intolerance, polydipsia, polyphagia and polyuria. Musculoskeletal: Negative. Skin: Positive for rash. Negative for color change, pallor and wound. Allergic/Immunologic: Positive for environmental allergies. Negative for food allergies and immunocompromised state. Neurological: Negative for dizziness, light-headedness, numbness and headaches. Hematological: Negative for adenopathy. Does not bruise/bleed easily. Psychiatric/Behavioral: Negative for agitation, confusion, dysphoric mood, hallucinations and self-injury. The patient is not nervous/anxious. PAST MEDICAL HISTORY         Diagnosis Date    Allergic rhinitis     Anxiety     Atrial fibrillation ([facility]     Chronic kidney disease     Epistaxis 3/14/2017    Headache     Hypertension     Hypothyroidism     Inflamed seborrheic keratosis      SURGICAL HISTORY     Patient  has a past surgical history that includes Hysterectomy.   CURRENT MEDICATIONS       Previous Medications    AMLODIPINE (NORVASC) 10 MG TABLET    TAKE 1 TABLET EVERY DAY    APIXABAN (ELIQUIS) 2.5 MG TABS TABLET    Take 1 tablet by mouth 2 times daily    ASCORBIC ACID (VITAMIN C) 1000 MG TABLET    Take 1,000 mg by mouth daily     B COMPLEX-C ER PO    Take by mouth daily    CETIRIZINE HCL (ZYRTEC ALLERGY) 10 MG CAPS    Take 10 mg by mouth daily as needed     CYANOCOBALAMIN (B-12 PO)    Take 1,000 mg by mouth daily Pt said she splits a 2500mg dissolving tab in half so approximately 1200mg  Daily. MAGNESIUM OXIDE 250 MG TABS    Take by mouth daily    METOPROLOL SUCCINATE (TOPROL XL) 100 MG EXTENDED RELEASE TABLET    Take 1 tablet by mouth daily    PREVIDENT 5000 BOOSTER PLUS 1.1 % PSTE    BRUSH AND SPIT AT BEDTIME. NO EATING DRINKING  OR RINSING FOR 30 MINUTES    SERTRALINE (ZOLOFT) 100 MG TABLET    TAKE 1 TABLET EVERY DAY    VITAMIN D3 (CHOLECALCIFEROL) 400 UNITS TABS TABLET    Take 400 Units by mouth daily      ALLERGIES     Patient is is allergic to dofetilide, dust mite extract, and molds & smuts. FAMILY HISTORY     Patient'sfamily history includes Cancer in her sister. HISTORY     Patient  reports that she quit smoking about 39 years ago. She has a 0.66 pack-year smoking history. She has never used smokeless tobacco. She reports that she does not drink alcohol and does not use drugs. PHYSICAL EXAM     VITALS  BP: 120/82, Temp: 98.5 °F (36.9 °C), Pulse: 63,  , SpO2: 98 %  Physical Exam  Constitutional:       General: She is not in acute distress. Appearance: Normal appearance. She is not ill-appearing or diaphoretic. HENT:      Head: Normocephalic. Mouth/Throat:      Mouth: Mucous membranes are moist.   Eyes:      General:         Right eye: No discharge. Left eye: No discharge. Conjunctiva/sclera: Conjunctivae normal.   Cardiovascular:      Rate and Rhythm: Normal rate and regular rhythm. Pulmonary:      Effort: Pulmonary effort is normal. No respiratory distress. Breath sounds: Normal breath sounds. Musculoskeletal:         General: Normal range of motion. Skin:     General: Skin is warm. Capillary Refill: Capillary refill takes less than 2 seconds. Coloration: Skin is not jaundiced or pale.       Findings: No bruising, erythema, lesion or rash. Comments: Diffuse papular erythemic dry rash   Neurological:      General: No focal deficit present. Mental Status: She is alert and oriented to person, place, and time. Mental status is at baseline. Gait: Gait normal.   Psychiatric:         Mood and Affect: Mood normal.         Behavior: Behavior normal.       READY CARE COURSE   No orders of the defined types were placed in this encounter. Labs:  No results found for this visit on 02/01/22. IMAGING:  No orders to display     Scheduled Meds:  Continuous Infusions:  PRN Meds:. PROCEDURES:  FINAL IMPRESSION      1. Dermatitis        DISPOSITION/PLAN     HISTORY OF PRESENT ILLNESS   Heather Moss is a [de-identified] y.o. female who presents with reporting itching and burning rash to bilateral arms starting 3 days ago. Patient admits to using Retin-A cream to bilateral arms for 3 days prior to appearance of rash and shaving her arms night prior. Pt is afebrile has nontoxic appearance and VS are stable. There is diffuse papular dry rash to the bilateral arms anteriorly from above the wrist to senior care up the upper arm. Symptoms are consistent with irritant dermatitis. I have a low suspicion for cellulitis there is mild erythema no swelling no spreading of erythremia to other areas. No bruising or discharge. Advised patient to begin using Aquaphor lotion twice daily. Discontinue use of Retin-A cream and any actions that may cause irritation to the skin such as shaving. PATIENT REFERRED TO:  Return if symptoms worsen or fail to improve, for Follow up with PCP. DISCHARGE MEDICATIONS:  New Prescriptions    No medications on file     Cannot display discharge medications since this is not an admission.        Jose M Ozuna, NICOLETTE - CNP

## 2022-04-01 DIAGNOSIS — I10 ESSENTIAL HYPERTENSION: ICD-10-CM

## 2022-04-01 DIAGNOSIS — E03.9 HYPOTHYROIDISM, UNSPECIFIED TYPE: ICD-10-CM

## 2022-04-01 DIAGNOSIS — R53.83 FATIGUE, UNSPECIFIED TYPE: ICD-10-CM

## 2022-04-01 LAB
ALBUMIN SERPL-MCNC: 4.6 G/DL (ref 3.5–4.6)
ALP BLD-CCNC: 70 U/L (ref 40–130)
ALT SERPL-CCNC: 13 U/L (ref 0–33)
ANION GAP SERPL CALCULATED.3IONS-SCNC: 13 MEQ/L (ref 9–15)
AST SERPL-CCNC: 16 U/L (ref 0–35)
BASOPHILS ABSOLUTE: 0.1 K/UL (ref 0–0.2)
BASOPHILS RELATIVE PERCENT: 3 %
BILIRUB SERPL-MCNC: 0.5 MG/DL (ref 0.2–0.7)
BUN BLDV-MCNC: 21 MG/DL (ref 8–23)
CALCIUM SERPL-MCNC: 9.6 MG/DL (ref 8.5–9.9)
CHLORIDE BLD-SCNC: 102 MEQ/L (ref 95–107)
CO2: 26 MEQ/L (ref 20–31)
CREAT SERPL-MCNC: 0.73 MG/DL (ref 0.5–0.9)
EOSINOPHILS ABSOLUTE: 0.1 K/UL (ref 0–0.7)
EOSINOPHILS RELATIVE PERCENT: 2.8 %
GFR AFRICAN AMERICAN: >60
GFR NON-AFRICAN AMERICAN: >60
GLOBULIN: 2.8 G/DL (ref 2.3–3.5)
GLUCOSE BLD-MCNC: 101 MG/DL (ref 70–99)
HCT VFR BLD CALC: 44.3 % (ref 37–47)
HEMOGLOBIN: 14.8 G/DL (ref 12–16)
LYMPHOCYTES ABSOLUTE: 1 K/UL (ref 1–4.8)
LYMPHOCYTES RELATIVE PERCENT: 20.4 %
MCH RBC QN AUTO: 31.2 PG (ref 27–31.3)
MCHC RBC AUTO-ENTMCNC: 33.5 % (ref 33–37)
MCV RBC AUTO: 93.2 FL (ref 82–100)
MONOCYTES ABSOLUTE: 0.6 K/UL (ref 0.2–0.8)
MONOCYTES RELATIVE PERCENT: 12.9 %
NEUTROPHILS ABSOLUTE: 2.9 K/UL (ref 1.4–6.5)
NEUTROPHILS RELATIVE PERCENT: 60.9 %
PDW BLD-RTO: 13.8 % (ref 11.5–14.5)
PLATELET # BLD: 186 K/UL (ref 130–400)
POTASSIUM SERPL-SCNC: 4.1 MEQ/L (ref 3.4–4.9)
RBC # BLD: 4.75 M/UL (ref 4.2–5.4)
SODIUM BLD-SCNC: 141 MEQ/L (ref 135–144)
T4 FREE: 1.05 NG/DL (ref 0.84–1.68)
TOTAL PROTEIN: 7.4 G/DL (ref 6.3–8)
TSH REFLEX: 8.4 UIU/ML (ref 0.44–3.86)
WBC # BLD: 4.8 K/UL (ref 4.8–10.8)

## 2022-04-05 ENCOUNTER — TELEPHONE (OUTPATIENT)
Dept: FAMILY MEDICINE CLINIC | Age: 82
End: 2022-04-05

## 2022-04-05 DIAGNOSIS — E03.9 HYPOTHYROIDISM, UNSPECIFIED TYPE: Primary | ICD-10-CM

## 2022-04-05 RX ORDER — LEVOTHYROXINE SODIUM 0.03 MG/1
25 TABLET ORAL DAILY
Qty: 90 TABLET | Refills: 1 | Status: SHIPPED | OUTPATIENT
Start: 2022-04-05 | End: 2022-08-23

## 2022-04-05 NOTE — TELEPHONE ENCOUNTER
Patient stopped in office. States she changed her mind about they thyroid medication. Would like medication sent to [address] [place] [address]

## 2022-05-02 DIAGNOSIS — I10 ESSENTIAL HYPERTENSION: ICD-10-CM

## 2022-05-02 RX ORDER — AMLODIPINE BESYLATE 10 MG/1
TABLET ORAL
Qty: 90 TABLET | Refills: 1 | Status: SHIPPED | OUTPATIENT
Start: 2022-05-02 | End: 2022-07-19 | Stop reason: SDUPTHER

## 2022-05-11 DIAGNOSIS — I48.19 PERSISTENT ATRIAL FIBRILLATION (HCC): ICD-10-CM

## 2022-05-11 DIAGNOSIS — F41.9 ANXIETY: ICD-10-CM

## 2022-05-12 RX ORDER — SERTRALINE HYDROCHLORIDE 100 MG/1
TABLET, FILM COATED ORAL
Qty: 90 TABLET | Refills: 1 | Status: SHIPPED | OUTPATIENT
Start: 2022-05-12

## 2022-05-12 RX ORDER — APIXABAN 2.5 MG/1
TABLET, FILM COATED ORAL
Qty: 180 TABLET | Refills: 0 | Status: SHIPPED | OUTPATIENT
Start: 2022-05-12

## 2022-05-12 NOTE — TELEPHONE ENCOUNTER
Appointments    Encounter Information    Provider Department Appt Notes   7/19/2022 Linwood Figueroa [facility] Primary [facility] 6 month follow up       Past Visits    Date Provider Specialty Visit Type Primary Dx   02/01/2022 NICOLETTE Marin CNP Family Medicine Office Visit Dermatitis   01/18/2022 NICOLETTE Figueroa CNP Family Medicine Virtual Visit Routine general medical examination at a health care facility   11/02/2021 NICOLETTE Figueroa CNP Family Medicine Office Visit Hip pain   08/12/2021 NICOLETTE Figueroa CNP Family Medicine Office Visit Hip pain   06/15/2021 Colleen Crenshaw MD Otolaryngology Office Visit Conductive hearing loss,

## 2022-05-12 NOTE — TELEPHONE ENCOUNTER
Please approve or deny this refill request. The order is pended. Thank you.     LOV 3/26/2021    Next Visit Date:  Future Appointments   Date Time Provider Taylor Nolasco   7/19/2022 10:00 AM NICOLETTE Celsetin - CNP [facility]

## 2022-07-19 ENCOUNTER — OFFICE VISIT (OUTPATIENT)
Dept: FAMILY MEDICINE CLINIC | Age: 82
End: 2022-07-19
Payer: MEDICARE

## 2022-07-19 VITALS
BODY MASS INDEX: 25.03 KG/M2 | OXYGEN SATURATION: 95 % | DIASTOLIC BLOOD PRESSURE: 72 MMHG | WEIGHT: 136 LBS | SYSTOLIC BLOOD PRESSURE: 110 MMHG | HEART RATE: 81 BPM | HEIGHT: 62 IN

## 2022-07-19 DIAGNOSIS — E03.9 HYPOTHYROIDISM, UNSPECIFIED TYPE: ICD-10-CM

## 2022-07-19 DIAGNOSIS — M79.605 PAIN IN BOTH LOWER EXTREMITIES: ICD-10-CM

## 2022-07-19 DIAGNOSIS — E03.9 HYPOTHYROIDISM, UNSPECIFIED TYPE: Primary | ICD-10-CM

## 2022-07-19 DIAGNOSIS — R53.83 FATIGUE, UNSPECIFIED TYPE: ICD-10-CM

## 2022-07-19 DIAGNOSIS — M79.604 PAIN IN BOTH LOWER EXTREMITIES: ICD-10-CM

## 2022-07-19 DIAGNOSIS — D64.9 ANEMIA, UNSPECIFIED TYPE: ICD-10-CM

## 2022-07-19 DIAGNOSIS — I10 ESSENTIAL HYPERTENSION: ICD-10-CM

## 2022-07-19 DIAGNOSIS — M79.89 LEG SWELLING: ICD-10-CM

## 2022-07-19 LAB
ALBUMIN SERPL-MCNC: 4.5 G/DL (ref 3.5–4.6)
ALP BLD-CCNC: 80 U/L (ref 40–130)
ALT SERPL-CCNC: 17 U/L (ref 0–33)
ANION GAP SERPL CALCULATED.3IONS-SCNC: 11 MEQ/L (ref 9–15)
AST SERPL-CCNC: 22 U/L (ref 0–35)
BASOPHILS ABSOLUTE: 0.1 K/UL (ref 0–0.2)
BASOPHILS RELATIVE PERCENT: 1.9 %
BILIRUB SERPL-MCNC: 0.7 MG/DL (ref 0.2–0.7)
BUN BLDV-MCNC: 16 MG/DL (ref 8–23)
CALCIUM SERPL-MCNC: 9.6 MG/DL (ref 8.5–9.9)
CHLORIDE BLD-SCNC: 102 MEQ/L (ref 95–107)
CO2: 27 MEQ/L (ref 20–31)
CREAT SERPL-MCNC: 0.71 MG/DL (ref 0.5–0.9)
EOSINOPHILS ABSOLUTE: 0.1 K/UL (ref 0–0.7)
EOSINOPHILS RELATIVE PERCENT: 1.5 %
GFR AFRICAN AMERICAN: >60
GFR NON-AFRICAN AMERICAN: >60
GLOBULIN: 2.8 G/DL (ref 2.3–3.5)
GLUCOSE BLD-MCNC: 99 MG/DL (ref 70–99)
HCT VFR BLD CALC: 43.2 % (ref 37–47)
HEMOGLOBIN: 14.4 G/DL (ref 12–16)
LYMPHOCYTES ABSOLUTE: 0.6 K/UL (ref 1–4.8)
LYMPHOCYTES RELATIVE PERCENT: 10.4 %
MAGNESIUM: 2.3 MG/DL (ref 1.7–2.4)
MCH RBC QN AUTO: 31.3 PG (ref 27–31.3)
MCHC RBC AUTO-ENTMCNC: 33.3 % (ref 33–37)
MCV RBC AUTO: 93.9 FL (ref 82–100)
MONOCYTES ABSOLUTE: 0.6 K/UL (ref 0.2–0.8)
MONOCYTES RELATIVE PERCENT: 11.3 %
NEUTROPHILS ABSOLUTE: 4.1 K/UL (ref 1.4–6.5)
NEUTROPHILS RELATIVE PERCENT: 74.9 %
PDW BLD-RTO: 13.8 % (ref 11.5–14.5)
PLATELET # BLD: 212 K/UL (ref 130–400)
POTASSIUM SERPL-SCNC: 4.3 MEQ/L (ref 3.4–4.9)
RBC # BLD: 4.61 M/UL (ref 4.2–5.4)
SODIUM BLD-SCNC: 140 MEQ/L (ref 135–144)
T4 FREE: 1.22 NG/DL (ref 0.84–1.68)
TOTAL PROTEIN: 7.3 G/DL (ref 6.3–8)
TSH REFLEX: 4.24 UIU/ML (ref 0.44–3.86)
WBC # BLD: 5.5 K/UL (ref 4.8–10.8)

## 2022-07-19 PROCEDURE — 1036F TOBACCO NON-USER: CPT | Performed by: NURSE PRACTITIONER

## 2022-07-19 PROCEDURE — G8427 DOCREV CUR MEDS BY ELIG CLIN: HCPCS | Performed by: NURSE PRACTITIONER

## 2022-07-19 PROCEDURE — G8420 CALC BMI NORM PARAMETERS: HCPCS | Performed by: NURSE PRACTITIONER

## 2022-07-19 PROCEDURE — 99214 OFFICE O/P EST MOD 30 MIN: CPT | Performed by: NURSE PRACTITIONER

## 2022-07-19 PROCEDURE — 1090F PRES/ABSN URINE INCON ASSESS: CPT | Performed by: NURSE PRACTITIONER

## 2022-07-19 PROCEDURE — G8399 PT W/DXA RESULTS DOCUMENT: HCPCS | Performed by: NURSE PRACTITIONER

## 2022-07-19 PROCEDURE — 1123F ACP DISCUSS/DSCN MKR DOCD: CPT | Performed by: NURSE PRACTITIONER

## 2022-07-19 RX ORDER — AMLODIPINE BESYLATE 10 MG/1
TABLET ORAL
Qty: 90 TABLET | Refills: 1 | Status: SHIPPED | OUTPATIENT
Start: 2022-07-19

## 2022-07-19 ASSESSMENT — ENCOUNTER SYMPTOMS
SHORTNESS OF BREATH: 0
WHEEZING: 0
COLOR CHANGE: 1

## 2022-07-19 NOTE — PROGRESS NOTES
Subjective  Chief Complaint   Patient presents with    Hypertension     6 month f/u , pt states concern with leg swelling          HPI    Having bilateral leg pain and swelling. Notes some skin changes as well intermittently on her lower legs  Has \"spots\" on legs as well. Notes that this first began approx. 30 years ago but has gotten worse in last few years. Notes that they are very painful. No signs and symptoms of hypothyroidism. HTN- bp has been well controlled with no issues with med. Afib- active but asymptomatic. Tolerating the eliquis without issues. Patient Active Problem List    Diagnosis Date Noted    Conductive hearing loss, bilateral 06/15/2021    Bilateral impacted cerumen 06/15/2021    Sensorineural hearing loss (SNHL) of both ears 06/15/2021    Ataxia 12/20/2018    A-fib ([facility] 12/17/2018    Inflamed seborrheic keratosis     Epistaxis 03/14/2017    Atopic rhinitis 04/08/2016    Calculus of gallbladder 04/08/2016    Calculus of kidney 04/08/2016    Benign essential hypertension 04/08/2016    Atypical migraine 04/08/2016    Muscle pain 04/08/2016    Anxiety state 04/08/2016    Persistent atrial fibrillation ([address] 01/04/2016    Abnormal finding on thyroid function test 07/13/2005    Postmenopausal status 05/27/2005     Past Medical History:   Diagnosis Date    Allergic rhinitis     Anxiety     Atrial fibrillation (HCC)     Chronic kidney disease     Epistaxis 3/14/2017    Headache     Hypertension     Hypothyroidism     Inflamed seborrheic keratosis      Past Surgical History:   Procedure Laterality Date    HYSTERECTOMY (CERVIX STATUS UNKNOWN)       Family History   Problem Relation Age of Onset    Cancer Sister         colon, brain     Social History     Socioeconomic History    Marital status:       Spouse name: None    Number of children: None    Years of education: None    Highest education level: None   Tobacco Use    Smoking status: Former     Packs/day: 0.33     Years: 2.00 Pack years: 0.66     Types: Cigarettes     Quit date: 1982     Years since quittin.1    Smokeless tobacco: Never   Substance and Sexual Activity    Alcohol use: No    Drug use: No     Social Determinants of Health     Financial Resource Strain: Low Risk     Difficulty of Paying Living Expenses: Not hard at all   Food Insecurity: No Food Insecurity    Worried About Running Out of Food in the Last Year: Never true    Ran Out of Food in the Last Year: Never true     Current Outpatient Medications on File Prior to Visit   Medication Sig Dispense Refill    ELIQUIS 2.5 MG TABS tablet TAKE 1 TABLET TWICE DAILY 180 tablet 0    sertraline (ZOLOFT) 100 MG tablet TAKE 1 TABLET EVERY DAY 90 tablet 1    amLODIPine (NORVASC) 10 MG tablet TAKE 1 TABLET EVERY DAY 90 tablet 1    levothyroxine (SYNTHROID) 25 MCG tablet Take 1 tablet by mouth daily 90 tablet 1    metoprolol succinate (TOPROL XL) 100 MG extended release tablet Take 1 tablet by mouth daily 90 tablet 3    PREVIDENT 5000 BOOSTER PLUS 1.1 % PSTE BRUSH AND SPIT AT BEDTIME. NO EATING DRINKING  OR RINSING FOR 30 MINUTES  5    Magnesium Oxide 250 MG TABS Take by mouth daily      Cetirizine HCl 10 MG CAPS Take 10 mg by mouth daily as needed       B COMPLEX-C ER PO Take by mouth daily      vitamin D3 (CHOLECALCIFEROL) 400 units TABS tablet Take 400 Units by mouth daily       Ascorbic Acid (VITAMIN C) 1000 MG tablet Take 1,000 mg by mouth daily       Cyanocobalamin (B-12 PO) Take 1,000 mg by mouth daily Pt said she splits a 2500mg dissolving tab in half so approximately 1200mg  Daily. No current facility-administered medications on file prior to visit. Allergies   Allergen Reactions    Dofetilide Other (See Comments)     VERTIGO    Dust Mite Extract     Molds & Smuts        Review of Systems   Constitutional:  Positive for fatigue (Chronic). Negative for chills and fever. HENT:  Negative for congestion.     Respiratory:  Negative for shortness of breath and wheezing. Cardiovascular:  Positive for leg swelling (Leg swelling and pain). Negative for chest pain and palpitations. Musculoskeletal:  Positive for myalgias (Pain in bilateral legs). Negative for joint swelling. Skin:  Positive for color change (\"spots\" on legs bilaterally). Negative for pallor and wound. Neurological:  Negative for dizziness, weakness and light-headedness. All other systems reviewed and are negative. Objective  Vitals:    07/19/22 0950   BP: 110/72   Pulse: 81   SpO2: 95%   Weight: 136 lb (61.7 kg)   Height: 5' 2\" (1.575 m)     Physical Exam  Vitals and nursing note reviewed. Constitutional:       General: She is not in acute distress. Appearance: Normal appearance. She is normal weight. She is not ill-appearing. HENT:      Head: Normocephalic and atraumatic. Cardiovascular:      Rate and Rhythm: Normal rate. Rhythm irregular. Heart sounds: No murmur heard. No gallop. Comments: Atrial fibrillation    Pulmonary:      Effort: Pulmonary effort is normal. No respiratory distress. Comments: Mildly diminished bases bilaterally     Skin:     General: Skin is warm and dry. Coloration: Skin is not jaundiced or pale. Findings: Erythema (Mild red spots on legs bilaterally) present. No bruising or lesion. Neurological:      General: No focal deficit present. Mental Status: She is alert and oriented to person, place, and time. Mental status is at baseline. Motor: No weakness. Gait: Gait normal.   Psychiatric:         Mood and Affect: Mood normal.         Behavior: Behavior normal.         Thought Content: Thought content normal.         Judgment: Judgment normal.       Assessment & Plan     Diagnosis Orders   1. Hypothyroidism, unspecified type  TSH with Reflex      2. Essential hypertension  amLODIPine (NORVASC) 10 MG tablet    Comprehensive Metabolic Panel      3.  Pain in both lower extremities  Magnesium    [facility] [place], NP, Interventional Radiology, [place]    Vitamin B12 & Folate      4. Fatigue, unspecified type  CBC with Auto Differential    Iron and TIBC      5. Leg swelling  Alexa Mcconnell NP, Interventional Radiology, [place]      6. Anemia, unspecified type  CBC with Auto Differential    Vitamin B12 & Folate          Orders Placed This Encounter   Procedures    TSH with Reflex     Standing Status:   Future     Standing Expiration Date:   7/19/2023    Comprehensive Metabolic Panel     Standing Status:   Future     Standing Expiration Date:   7/19/2023    Magnesium     Standing Status:   Future     Standing Expiration Date:   7/19/2023    CBC with Auto Differential     Standing Status:   Future     Standing Expiration Date:   7/19/2023    Iron and TIBC     Standing Status:   Future     Standing Expiration Date:   7/19/2023     Order Specific Question:   Is Patient Fasting? Answer:   no     Order Specific Question:   No of Hours? Answer:   n/a    Vitamin B12 & Folate     Standing Status:   Future     Standing Expiration Date:   7/19/2023    Ambrocio Judd NP, Interventional Radiology, [place]     Referral Priority:   Routine     Referral Type:   Eval and Treat     Referral Reason:   Specialty Services Required     Referred to Provider:   NICOLETTE Correa - CNP     Requested Specialty:   Nurse Practitioner     Number of Visits Requested:   1       Orders Placed This Encounter   Medications    amLODIPine (NORVASC) 10 MG tablet     Sig: TAKE 1 TABLET EVERY DAY     Dispense:  90 tablet     Refill:  1       Side effects, adverse effects of the medication prescribed today, as well as treatment plan/ rationale and result expectations have been discussed with the patient who expresses understanding and desires to proceed. Close follow up to evaluate treatment results and for coordination of care. I have reviewed the patient's medical history in detail and updated the computerized patient record.      As always, patient is advised that if symptoms worsen in any way they will proceed to the nearest emergency room. F/U 1 mos.     Tk Raphael, NICOLETTE - CNP

## 2022-07-20 LAB
FOLATE: >20 NG/ML
IRON SATURATION: 33 % (ref 20–55)
IRON: 118 UG/DL (ref 37–145)
TOTAL IRON BINDING CAPACITY: 363 UG/DL (ref 250–450)
UNSATURATED IRON BINDING CAPACITY: 245 UG/DL (ref 112–347)
VITAMIN B-12: 1303 PG/ML (ref 232–1245)

## 2022-08-02 ENCOUNTER — TELEPHONE (OUTPATIENT)
Dept: FAMILY MEDICINE CLINIC | Age: 82
End: 2022-08-02

## 2022-08-02 NOTE — TELEPHONE ENCOUNTER
Pt came to office after receiving missed consult letter for interventional radiology. Pt states she did not go because she recently started using copper compression knee socks. And states they work great.      fyi

## 2022-08-18 ENCOUNTER — OFFICE VISIT (OUTPATIENT)
Dept: FAMILY MEDICINE CLINIC | Age: 82
End: 2022-08-18
Payer: MEDICARE

## 2022-08-18 VITALS
WEIGHT: 135 LBS | BODY MASS INDEX: 24.84 KG/M2 | SYSTOLIC BLOOD PRESSURE: 108 MMHG | OXYGEN SATURATION: 95 % | DIASTOLIC BLOOD PRESSURE: 80 MMHG | HEIGHT: 62 IN | HEART RATE: 84 BPM

## 2022-08-18 DIAGNOSIS — M79.604 PAIN IN BOTH LOWER EXTREMITIES: ICD-10-CM

## 2022-08-18 DIAGNOSIS — I48.19 PERSISTENT ATRIAL FIBRILLATION (HCC): ICD-10-CM

## 2022-08-18 DIAGNOSIS — M79.605 PAIN IN BOTH LOWER EXTREMITIES: ICD-10-CM

## 2022-08-18 DIAGNOSIS — I10 ESSENTIAL HYPERTENSION: Primary | ICD-10-CM

## 2022-08-18 DIAGNOSIS — E03.9 HYPOTHYROIDISM, UNSPECIFIED TYPE: ICD-10-CM

## 2022-08-18 PROCEDURE — 1123F ACP DISCUSS/DSCN MKR DOCD: CPT | Performed by: NURSE PRACTITIONER

## 2022-08-18 PROCEDURE — 1090F PRES/ABSN URINE INCON ASSESS: CPT | Performed by: NURSE PRACTITIONER

## 2022-08-18 PROCEDURE — 1036F TOBACCO NON-USER: CPT | Performed by: NURSE PRACTITIONER

## 2022-08-18 PROCEDURE — G8420 CALC BMI NORM PARAMETERS: HCPCS | Performed by: NURSE PRACTITIONER

## 2022-08-18 PROCEDURE — G8399 PT W/DXA RESULTS DOCUMENT: HCPCS | Performed by: NURSE PRACTITIONER

## 2022-08-18 PROCEDURE — 99213 OFFICE O/P EST LOW 20 MIN: CPT | Performed by: NURSE PRACTITIONER

## 2022-08-18 PROCEDURE — G8427 DOCREV CUR MEDS BY ELIG CLIN: HCPCS | Performed by: NURSE PRACTITIONER

## 2022-08-18 ASSESSMENT — ENCOUNTER SYMPTOMS
SHORTNESS OF BREATH: 0
CONSTIPATION: 0
COUGH: 0
DIARRHEA: 0

## 2022-08-18 NOTE — PROGRESS NOTES
Subjective  Chief Complaint   Patient presents with    Hypothyroidism     1 month f/u          HPI    Leg swelling has been much better since last visit. Compression stockings helping a lot. All labs were ok. Lab Results   Component Value Date    WBC 5.5 07/19/2022    HGB 14.4 07/19/2022    HCT 43.2 07/19/2022     07/19/2022    CHOL 177 12/28/2015    TRIG 72 12/28/2015    HDL 53 12/28/2015    ALT 17 07/19/2022    AST 22 07/19/2022     07/19/2022    K 4.3 07/19/2022     07/19/2022    CREATININE 0.71 07/19/2022    BUN 16 07/19/2022    CO2 27 07/19/2022    TSH 0.856 11/30/2016    INR 1.0 09/23/2019         Patient Active Problem List    Diagnosis Date Noted    Conductive hearing loss, bilateral 06/15/2021    Bilateral impacted cerumen 06/15/2021    Sensorineural hearing loss (SNHL) of both ears 06/15/2021    Ataxia 12/20/2018    A-fib ([address] 12/17/2018    Inflamed seborrheic keratosis     Epistaxis 03/14/2017    Atopic rhinitis 04/08/2016    Calculus of gallbladder 04/08/2016    Calculus of kidney 04/08/2016    Benign essential hypertension 04/08/2016    Atypical migraine 04/08/2016    Muscle pain 04/08/2016    Anxiety state 04/08/2016    Persistent atrial fibrillation ([address] 01/04/2016    Abnormal finding on thyroid function test 07/13/2005    Postmenopausal status 05/27/2005     Past Medical History:   Diagnosis Date    Allergic rhinitis     Anxiety     Atrial fibrillation (HCC)     Chronic kidney disease     Epistaxis 3/14/2017    Headache     Hypertension     Hypothyroidism     Inflamed seborrheic keratosis      Past Surgical History:   Procedure Laterality Date    HYSTERECTOMY (CERVIX STATUS UNKNOWN)       Family History   Problem Relation Age of Onset    Cancer Sister         colon, brain     Social History     Socioeconomic History    Marital status:       Spouse name: None    Number of children: None    Years of education: None    Highest education level: None   Tobacco Use Smoking status: Former     Packs/day: 0.33     Years: 2.00     Pack years: 0.66     Types: Cigarettes     Quit date: 1982     Years since quittin.2    Smokeless tobacco: Never   Substance and Sexual Activity    Alcohol use: No    Drug use: No     Social Determinants of Health     Financial Resource Strain: Low Risk     Difficulty of Paying Living Expenses: Not hard at all   Food Insecurity: No Food Insecurity    Worried About Running Out of Food in the Last Year: Never true    Ran Out of Food in the Last Year: Never true     Current Outpatient Medications on File Prior to Visit   Medication Sig Dispense Refill    amLODIPine (NORVASC) 10 MG tablet TAKE 1 TABLET EVERY DAY 90 tablet 1    ELIQUIS 2.5 MG TABS tablet TAKE 1 TABLET TWICE DAILY 180 tablet 0    sertraline (ZOLOFT) 100 MG tablet TAKE 1 TABLET EVERY DAY 90 tablet 1    levothyroxine (SYNTHROID) 25 MCG tablet Take 1 tablet by mouth daily 90 tablet 1    metoprolol succinate (TOPROL XL) 100 MG extended release tablet Take 1 tablet by mouth daily 90 tablet 3    PREVIDENT 5000 BOOSTER PLUS 1.1 % PSTE BRUSH AND SPIT AT BEDTIME. NO EATING DRINKING  OR RINSING FOR 30 MINUTES  5    Magnesium Oxide 250 MG TABS Take by mouth daily      Cetirizine HCl 10 MG CAPS Take 10 mg by mouth daily as needed       B COMPLEX-C ER PO Take by mouth daily      vitamin D3 (CHOLECALCIFEROL) 400 units TABS tablet Take 400 Units by mouth daily       Ascorbic Acid (VITAMIN C) 1000 MG tablet Take 1,000 mg by mouth daily       Cyanocobalamin (B-12 PO) Take 1,000 mg by mouth daily Pt said she splits a 2500mg dissolving tab in half so approximately 1200mg  Daily. No current facility-administered medications on file prior to visit. Allergies   Allergen Reactions    Dofetilide Other (See Comments)     VERTIGO    Dust Mite Extract     Molds & Smuts        Review of Systems   Constitutional:  Negative for fatigue. Respiratory:  Negative for cough and shortness of breath. Cardiovascular:  Negative for chest pain. Gastrointestinal:  Negative for constipation and diarrhea. Objective  Vitals:    08/18/22 0952   BP: 108/80   Pulse: 84   SpO2: 95%   Weight: 135 lb (61.2 kg)   Height: 5' 2\" (1.575 m)     Physical Exam  Vitals and nursing note reviewed. Constitutional:       Appearance: Normal appearance. She is normal weight. HENT:      Head: Normocephalic. Nose: Nose normal.      Mouth/Throat:      Mouth: Mucous membranes are moist.      Pharynx: Oropharynx is clear. Eyes:      Extraocular Movements: Extraocular movements intact. Conjunctiva/sclera: Conjunctivae normal.      Pupils: Pupils are equal, round, and reactive to light. Cardiovascular:      Rate and Rhythm: Normal rate and regular rhythm. Pulses: Normal pulses. Heart sounds: Normal heart sounds. Pulmonary:      Effort: Pulmonary effort is normal.      Breath sounds: Normal breath sounds. Musculoskeletal:      Cervical back: Neck supple. Right lower leg: No edema. Left lower leg: No edema. Skin:     General: Skin is warm. Neurological:      General: No focal deficit present. Mental Status: She is alert and oriented to person, place, and time. Mental status is at baseline. Psychiatric:         Mood and Affect: Mood normal.         Behavior: Behavior normal.         Thought Content: Thought content normal.         Judgment: Judgment normal.         Assessment & Plan     Diagnosis Orders   1. Essential hypertension  Well controlled      2. Hypothyroidism, unspecified type  Borderline. 3. Pain in both lower extremities  Improved       4. Persistent atrial fibrillation (HCC)  Still active. Rate controlled. Side effects, adverse effects of the medication prescribed today, as well as treatment plan/ rationale and result expectations have been discussed with the patient who expresses understanding and desires to proceed.     Close follow up to evaluate treatment results and for coordination of care. I have reviewed the patient's medical history in detail and updated the computerized patient record. As always, patient is advised that if symptoms worsen in any way they will proceed to the nearest emergency room. Return in about 4 months (around 12/18/2022).     NICOLETTE Miller - CNP

## 2022-08-22 DIAGNOSIS — E03.9 HYPOTHYROIDISM, UNSPECIFIED TYPE: ICD-10-CM

## 2022-08-22 DIAGNOSIS — I10 ESSENTIAL HYPERTENSION: ICD-10-CM

## 2022-08-23 RX ORDER — METOPROLOL SUCCINATE 100 MG/1
TABLET, EXTENDED RELEASE ORAL
Qty: 90 TABLET | Refills: 3 | Status: SHIPPED | OUTPATIENT
Start: 2022-08-23

## 2022-08-23 RX ORDER — LEVOTHYROXINE SODIUM 0.03 MG/1
TABLET ORAL
Qty: 90 TABLET | Refills: 1 | Status: SHIPPED | OUTPATIENT
Start: 2022-08-23

## 2022-08-23 NOTE — TELEPHONE ENCOUNTER
Future Appointments    Encounter Information    Provider Department Appt Notes   12/19/2022 NICOLETTE Katz CNP [facility] Primary Care Return in about 4 months (around 12/18/2022).      Past Visits    Date Provider Specialty Visit Type Primary Dx   08/18/2022 NICOLETTE Katz CNP Family Medicine Office Visit Essential hypertension   07/19/2022 NICOLETTE Katz CNP Family Medicine Office Visit Hypothyroidism, unspecified type

## 2022-12-05 DIAGNOSIS — I10 ESSENTIAL HYPERTENSION: ICD-10-CM

## 2022-12-06 RX ORDER — AMLODIPINE BESYLATE 10 MG/1
TABLET ORAL
Qty: 90 TABLET | Refills: 1 | Status: SHIPPED | OUTPATIENT
Start: 2022-12-06

## 2022-12-19 ENCOUNTER — OFFICE VISIT (OUTPATIENT)
Dept: FAMILY MEDICINE CLINIC | Age: 82
End: 2022-12-19
Payer: MEDICARE

## 2022-12-19 VITALS
SYSTOLIC BLOOD PRESSURE: 112 MMHG | HEART RATE: 81 BPM | DIASTOLIC BLOOD PRESSURE: 82 MMHG | BODY MASS INDEX: 25.4 KG/M2 | OXYGEN SATURATION: 94 % | HEIGHT: 62 IN | WEIGHT: 138 LBS

## 2022-12-19 DIAGNOSIS — E03.9 HYPOTHYROIDISM, UNSPECIFIED TYPE: ICD-10-CM

## 2022-12-19 DIAGNOSIS — I48.19 PERSISTENT ATRIAL FIBRILLATION (HCC): ICD-10-CM

## 2022-12-19 DIAGNOSIS — I10 ESSENTIAL HYPERTENSION: ICD-10-CM

## 2022-12-19 DIAGNOSIS — E03.9 HYPOTHYROIDISM, UNSPECIFIED TYPE: Primary | ICD-10-CM

## 2022-12-19 LAB
ALBUMIN SERPL-MCNC: 4.7 G/DL (ref 3.5–4.6)
ALP BLD-CCNC: 68 U/L (ref 40–130)
ALT SERPL-CCNC: 18 U/L (ref 0–33)
ANION GAP SERPL CALCULATED.3IONS-SCNC: 12 MEQ/L (ref 9–15)
AST SERPL-CCNC: 25 U/L (ref 0–35)
BILIRUB SERPL-MCNC: 0.6 MG/DL (ref 0.2–0.7)
BUN BLDV-MCNC: 11 MG/DL (ref 8–23)
CALCIUM SERPL-MCNC: 9.8 MG/DL (ref 8.5–9.9)
CHLORIDE BLD-SCNC: 101 MEQ/L (ref 95–107)
CO2: 25 MEQ/L (ref 20–31)
CREAT SERPL-MCNC: 0.8 MG/DL (ref 0.5–0.9)
GFR SERPL CREATININE-BSD FRML MDRD: >60 ML/MIN/{1.73_M2}
GLOBULIN: 2.9 G/DL (ref 2.3–3.5)
GLUCOSE BLD-MCNC: 116 MG/DL (ref 70–99)
POTASSIUM SERPL-SCNC: 3.7 MEQ/L (ref 3.4–4.9)
SODIUM BLD-SCNC: 138 MEQ/L (ref 135–144)
TOTAL PROTEIN: 7.6 G/DL (ref 6.3–8)
TSH REFLEX: 3.97 UIU/ML (ref 0.44–3.86)

## 2022-12-19 PROCEDURE — 1090F PRES/ABSN URINE INCON ASSESS: CPT | Performed by: NURSE PRACTITIONER

## 2022-12-19 PROCEDURE — G8399 PT W/DXA RESULTS DOCUMENT: HCPCS | Performed by: NURSE PRACTITIONER

## 2022-12-19 PROCEDURE — 1123F ACP DISCUSS/DSCN MKR DOCD: CPT | Performed by: NURSE PRACTITIONER

## 2022-12-19 PROCEDURE — 3078F DIAST BP <80 MM HG: CPT | Performed by: NURSE PRACTITIONER

## 2022-12-19 PROCEDURE — G8417 CALC BMI ABV UP PARAM F/U: HCPCS | Performed by: NURSE PRACTITIONER

## 2022-12-19 PROCEDURE — 1036F TOBACCO NON-USER: CPT | Performed by: NURSE PRACTITIONER

## 2022-12-19 PROCEDURE — 99214 OFFICE O/P EST MOD 30 MIN: CPT | Performed by: NURSE PRACTITIONER

## 2022-12-19 PROCEDURE — 3074F SYST BP LT 130 MM HG: CPT | Performed by: NURSE PRACTITIONER

## 2022-12-19 PROCEDURE — G8484 FLU IMMUNIZE NO ADMIN: HCPCS | Performed by: NURSE PRACTITIONER

## 2022-12-19 PROCEDURE — G8427 DOCREV CUR MEDS BY ELIG CLIN: HCPCS | Performed by: NURSE PRACTITIONER

## 2022-12-19 ASSESSMENT — ENCOUNTER SYMPTOMS
DIARRHEA: 0
SHORTNESS OF BREATH: 0
CONSTIPATION: 0
COUGH: 0

## 2022-12-19 NOTE — PROGRESS NOTES
Subjective  Chief Complaint   Patient presents with    Hypertension     4 month follow up          HPI    Pt states that she called EMS this am.  Had caffeine this AM as well as all her pills without food and she got shaky and bp shot up  She took an extra toprol. Feeling somewhat better. Still a little shaky. Has otherwise felt well. Happy overall. Zoloft doing well. Taking all medications as prescribed without issues. Patient Active Problem List    Diagnosis Date Noted    Conductive hearing loss, bilateral 06/15/2021    Bilateral impacted cerumen 06/15/2021    Sensorineural hearing loss (SNHL) of both ears 06/15/2021    Ataxia 2018    A-fib ([facility] 2018    Inflamed seborrheic keratosis     Epistaxis 2017    Atopic rhinitis 2016    Calculus of gallbladder 2016    Calculus of kidney 2016    Benign essential hypertension 2016    Atypical migraine 2016    Muscle pain 2016    Anxiety state 2016    Persistent atrial fibrillation ([address] 2016    Abnormal finding on thyroid function test 2005    Postmenopausal status 2005     Past Medical History:   Diagnosis Date    Allergic rhinitis     Anxiety     Atrial fibrillation (HCC)     Chronic kidney disease     Epistaxis 3/14/2017    Headache     Hypertension     Hypothyroidism     Inflamed seborrheic keratosis      Past Surgical History:   Procedure Laterality Date    HYSTERECTOMY (CERVIX STATUS UNKNOWN)       Family History   Problem Relation Age of Onset    Cancer Sister         colon, brain     Social History     Socioeconomic History    Marital status:       Spouse name: None    Number of children: None    Years of education: None    Highest education level: None   Tobacco Use    Smoking status: Former     Packs/day: 0.33     Years: 2.00     Pack years: 0.66     Types: Cigarettes     Quit date: 1982     Years since quittin.5    Smokeless tobacco: Never   Substance and Sexual Activity    Alcohol use: No    Drug use: No     Social Determinants of Health     Financial Resource Strain: Low Risk     Difficulty of Paying Living Expenses: Not hard at all   Food Insecurity: No Food Insecurity    Worried About Running Out of Food in the Last Year: Never true    Ran Out of Food in the Last Year: Never true     Current Outpatient Medications on File Prior to Visit   Medication Sig Dispense Refill    amLODIPine (NORVASC) 10 MG tablet TAKE 1 TABLET EVERY DAY 90 tablet 1    levothyroxine (SYNTHROID) 25 MCG tablet TAKE 1 TABLET EVERY DAY 90 tablet 1    metoprolol succinate (TOPROL XL) 100 MG extended release tablet TAKE 1 TABLET EVERY DAY 90 tablet 3    ELIQUIS 2.5 MG TABS tablet TAKE 1 TABLET TWICE DAILY 180 tablet 0    sertraline (ZOLOFT) 100 MG tablet TAKE 1 TABLET EVERY DAY 90 tablet 1    PREVIDENT 5000 BOOSTER PLUS 1.1 % PSTE BRUSH AND SPIT AT BEDTIME. NO EATING DRINKING  OR RINSING FOR 30 MINUTES  5    Magnesium Oxide 250 MG TABS Take by mouth daily      Cetirizine HCl 10 MG CAPS Take 10 mg by mouth daily as needed       B COMPLEX-C ER PO Take by mouth daily      vitamin D3 (CHOLECALCIFEROL) 400 units TABS tablet Take 400 Units by mouth daily       Ascorbic Acid (VITAMIN C) 1000 MG tablet Take 1,000 mg by mouth daily       Cyanocobalamin (B-12 PO) Take 1,000 mg by mouth daily Pt said she splits a 2500mg dissolving tab in half so approximately 1200mg  Daily. No current facility-administered medications on file prior to visit. Allergies   Allergen Reactions    Dofetilide Other (See Comments)     VERTIGO    Dust Mite Extract     Molds & Smuts        Review of Systems   Constitutional:  Negative for fatigue. Respiratory:  Negative for cough and shortness of breath. Cardiovascular:  Negative for chest pain. Gastrointestinal:  Negative for constipation and diarrhea.      Objective  Vitals:    12/19/22 1014   BP: 112/82   Pulse: 81   SpO2: 94%   Weight: 138 lb (62.6 kg) Height: 5' 2\" (1.575 m)     Physical Exam  Vitals and nursing note reviewed. Constitutional:       Appearance: Normal appearance. HENT:      Head: Normocephalic. Nose: Nose normal.      Mouth/Throat:      Mouth: Mucous membranes are moist.      Pharynx: Oropharynx is clear. Eyes:      Extraocular Movements: Extraocular movements intact. Conjunctiva/sclera: Conjunctivae normal.      Pupils: Pupils are equal, round, and reactive to light. Cardiovascular:      Rate and Rhythm: Normal rate and regular rhythm. Pulses: Normal pulses. Heart sounds: Normal heart sounds. Pulmonary:      Effort: Pulmonary effort is normal.      Breath sounds: Normal breath sounds. Musculoskeletal:      Cervical back: Neck supple. Skin:     General: Skin is warm. Neurological:      General: No focal deficit present. Mental Status: She is alert and oriented to person, place, and time. Mental status is at baseline. Psychiatric:         Mood and Affect: Mood normal.         Behavior: Behavior normal.         Thought Content: Thought content normal.         Judgment: Judgment normal.       Assessment & Plan     Diagnosis Orders   1. Hypothyroidism, unspecified type  TSH with Reflex      2. Essential hypertension  Comprehensive Metabolic Panel  Well controlled      3. Persistent atrial fibrillation (HCC)  Rate controlled. On eliquis          Orders Placed This Encounter   Procedures    TSH with Reflex     Standing Status:   Future     Number of Occurrences:   1     Standing Expiration Date:   12/19/2023    Comprehensive Metabolic Panel     Standing Status:   Future     Number of Occurrences:   1     Standing Expiration Date:   12/19/2023       Side effects, adverse effects of the medication prescribed today, as well as treatment plan/ rationale and result expectations have been discussed with the patient who expresses understanding and desires to proceed.     Close follow up to evaluate treatment results and for coordination of care. I have reviewed the patient's medical history in detail and updated the computerized patient record. As always, patient is advised that if symptoms worsen in any way they will proceed to the nearest emergency room. No orders of the defined types were placed in this encounter. There are no discontinued medications. Return in about 4 months (around 4/19/2023) for medicare wellness.        Flora Garsia, NICOLETTE - CNP

## 2023-01-02 DIAGNOSIS — F41.9 ANXIETY: ICD-10-CM

## 2023-01-03 RX ORDER — SERTRALINE HYDROCHLORIDE 100 MG/1
TABLET, FILM COATED ORAL
Qty: 90 TABLET | Refills: 1 | Status: SHIPPED | OUTPATIENT
Start: 2023-01-03

## 2023-01-03 NOTE — TELEPHONE ENCOUNTER
[id number]          Medication Refill  (Newest Message First)  Pernell, Cassier[place]ver In routed conversation to Sonny Chávez Pcp Clinical Staff 19 hours ago (12:31 PM)      Future Appointments    Encounter Information    Provider Department Appt Notes   4/20/2023 [facility] Primary Care Return in about 4 months (around 4/19/2023) for medicare wellness.      Past Visits    Date Provider Specialty Visit Type Primary Dx   12/19/2022 NICOLETTE Yoon - CNP Family Medicine Office Visit Hypothyroidism, unspecified type   08/18/2022 Naty Hawley, NICOLETTE - CNP Family Medicine Office Visit Essential hypertension   07/19/2022 Naty Hawley, NICOLETTE - CNP Family Medicine Office Visit Hypothyroidism, unspecified type   02/01/2022 NICOLETTE Liang CNP Family Medicine Office Visit Dermatitis   01/18/2022 Naty Hawley, NICOLETTE - CNP Family Medicine Virtual Visit Routine general medical examination at a [facility]

## 2023-03-04 ENCOUNTER — TELEPHONE (OUTPATIENT)
Dept: FAMILY MEDICINE CLINIC | Age: 83
End: 2023-03-04

## 2023-03-04 NOTE — TELEPHONE ENCOUNTER
Pt came in asking for a refill on tretinoin cream, usp 0.1%  Generic for Retin- A cream 45mg- apply to affected area daily  Would like it sent through [facility] mail order. I do not see it in her chart. She states she has had it filled here previously. Seemed confused.

## 2023-04-20 ENCOUNTER — OFFICE VISIT (OUTPATIENT)
Dept: FAMILY MEDICINE CLINIC | Age: 83
End: 2023-04-20
Payer: MEDICARE

## 2023-04-20 VITALS
HEART RATE: 78 BPM | DIASTOLIC BLOOD PRESSURE: 86 MMHG | WEIGHT: 141 LBS | OXYGEN SATURATION: 97 % | HEIGHT: 62 IN | SYSTOLIC BLOOD PRESSURE: 132 MMHG | BODY MASS INDEX: 25.95 KG/M2

## 2023-04-20 DIAGNOSIS — Z00.00 MEDICARE ANNUAL WELLNESS VISIT, SUBSEQUENT: Primary | ICD-10-CM

## 2023-04-20 PROCEDURE — 3075F SYST BP GE 130 - 139MM HG: CPT | Performed by: NURSE PRACTITIONER

## 2023-04-20 PROCEDURE — 1123F ACP DISCUSS/DSCN MKR DOCD: CPT | Performed by: NURSE PRACTITIONER

## 2023-04-20 PROCEDURE — G0439 PPPS, SUBSEQ VISIT: HCPCS | Performed by: NURSE PRACTITIONER

## 2023-04-20 PROCEDURE — 3079F DIAST BP 80-89 MM HG: CPT | Performed by: NURSE PRACTITIONER

## 2023-04-20 SDOH — ECONOMIC STABILITY: FOOD INSECURITY: WITHIN THE PAST 12 MONTHS, THE FOOD YOU BOUGHT JUST DIDN'T LAST AND YOU DIDN'T HAVE MONEY TO GET MORE.: NEVER TRUE

## 2023-04-20 SDOH — ECONOMIC STABILITY: INCOME INSECURITY: HOW HARD IS IT FOR YOU TO PAY FOR THE VERY BASICS LIKE FOOD, HOUSING, MEDICAL CARE, AND HEATING?: NOT HARD AT ALL

## 2023-04-20 SDOH — ECONOMIC STABILITY: HOUSING INSECURITY
IN THE LAST 12 MONTHS, WAS THERE A TIME WHEN YOU DID NOT HAVE A STEADY PLACE TO SLEEP OR SLEPT IN A SHELTER (INCLUDING NOW)?: NO

## 2023-04-20 SDOH — ECONOMIC STABILITY: FOOD INSECURITY: WITHIN THE PAST 12 MONTHS, YOU WORRIED THAT YOUR FOOD WOULD RUN OUT BEFORE YOU GOT MONEY TO BUY MORE.: NEVER TRUE

## 2023-04-20 ASSESSMENT — PATIENT HEALTH QUESTIONNAIRE - PHQ9
SUM OF ALL RESPONSES TO PHQ QUESTIONS 1-9: 0
SUM OF ALL RESPONSES TO PHQ QUESTIONS 1-9: 0
2. FEELING DOWN, DEPRESSED OR HOPELESS: 0
SUM OF ALL RESPONSES TO PHQ QUESTIONS 1-9: 0
SUM OF ALL RESPONSES TO PHQ9 QUESTIONS 1 & 2: 0
SUM OF ALL RESPONSES TO PHQ QUESTIONS 1-9: 0
1. LITTLE INTEREST OR PLEASURE IN DOING THINGS: 0

## 2023-04-20 NOTE — PATIENT INSTRUCTIONS
[place] on Aging online. You need 8096-6259 mg of calcium and 2946-0808 IU of vitamin D per day. It is possible to meet your calcium requirement with diet alone, but a vitamin D supplement is usually necessary to meet this goal.  When exposed to the sun, use a sunscreen that protects against both UVA and UVB radiation with an SPF of 30 or greater. Reapply every 2 to 3 hours or after sweating, drying off with a towel, or swimming. Always wear a seat belt when traveling in a car. Always wear a helmet when riding a bicycle or motorcycle.

## 2023-04-20 NOTE — PROGRESS NOTES
(Nurse Practitioner)  NICOLETTE Jansen CNP as PCP - Empaneled Provider     Reviewed and updated this visit:  Tobacco  Allergies  Meds  Med Hx  Surg Hx  Soc Hx  Fam Hx             NICOLETTE Jansen CNP

## 2023-04-26 DIAGNOSIS — E03.9 HYPOTHYROIDISM, UNSPECIFIED TYPE: ICD-10-CM

## 2023-04-27 RX ORDER — LEVOTHYROXINE SODIUM 0.03 MG/1
TABLET ORAL
Qty: 90 TABLET | Refills: 1 | Status: SHIPPED | OUTPATIENT
Start: 2023-04-27

## 2023-04-27 NOTE — TELEPHONE ENCOUNTER
Comments:     Last Office Visit (last PCP visit):   4/20/2023    Next Visit Date:  Future Appointments   Date Time Provider Taylor Nolasco   10/23/2023 10:15 AM NICOLETTE Malhotra CNP [facility] AT [place]       **If hasn't been seen in over a year OR hasn't followed up according to last diabetes/ADHD visit, make appointment for patient before sending refill to provider.     Rx requested:  Requested Prescriptions     Pending Prescriptions Disp Refills    levothyroxine (SYNTHROID) 25 MCG tablet [Pharmacy Med Name: LEVOTHYROXINE SODIUM 25 MCG Tablet] 90 tablet 1     Sig: TAKE 1 TABLET EVERY DAY

## 2023-05-13 DIAGNOSIS — I48.19 PERSISTENT ATRIAL FIBRILLATION (HCC): ICD-10-CM

## 2023-05-13 NOTE — TELEPHONE ENCOUNTER
Comments:     Last Office Visit (last PCP visit):   4/20/2023    Next Visit Date:  Future Appointments   Date Time Provider Taylor Nolasco   10/23/2023 10:15 AM NICOLETTE Barnett CNP [facility] AT [place]       **If hasn't been seen in over a year OR hasn't followed up according to last diabetes/ADHD visit, make appointment for patient before sending refill to provider.     Rx requested:  Requested Prescriptions     Pending Prescriptions Disp Refills    apixaban (ELIQUIS) 2.5 MG TABS tablet 180 tablet 0     Sig: Take 1 tablet by mouth 2 times daily

## 2023-05-30 ENCOUNTER — OFFICE VISIT (OUTPATIENT)
Dept: FAMILY MEDICINE CLINIC | Age: 83
End: 2023-05-30
Payer: MEDICARE

## 2023-05-30 VITALS
OXYGEN SATURATION: 96 % | SYSTOLIC BLOOD PRESSURE: 112 MMHG | WEIGHT: 139 LBS | HEART RATE: 110 BPM | HEIGHT: 62 IN | BODY MASS INDEX: 25.58 KG/M2 | TEMPERATURE: 98.1 F | DIASTOLIC BLOOD PRESSURE: 82 MMHG | RESPIRATION RATE: 14 BRPM

## 2023-05-30 DIAGNOSIS — L23.9 ALLERGIC DERMATITIS: Primary | ICD-10-CM

## 2023-05-30 PROCEDURE — G8399 PT W/DXA RESULTS DOCUMENT: HCPCS | Performed by: NURSE PRACTITIONER

## 2023-05-30 PROCEDURE — G8417 CALC BMI ABV UP PARAM F/U: HCPCS | Performed by: NURSE PRACTITIONER

## 2023-05-30 PROCEDURE — 99213 OFFICE O/P EST LOW 20 MIN: CPT | Performed by: NURSE PRACTITIONER

## 2023-05-30 PROCEDURE — 1123F ACP DISCUSS/DSCN MKR DOCD: CPT | Performed by: NURSE PRACTITIONER

## 2023-05-30 PROCEDURE — 1090F PRES/ABSN URINE INCON ASSESS: CPT | Performed by: NURSE PRACTITIONER

## 2023-05-30 PROCEDURE — 3079F DIAST BP 80-89 MM HG: CPT | Performed by: NURSE PRACTITIONER

## 2023-05-30 PROCEDURE — 3074F SYST BP LT 130 MM HG: CPT | Performed by: NURSE PRACTITIONER

## 2023-05-30 PROCEDURE — G8427 DOCREV CUR MEDS BY ELIG CLIN: HCPCS | Performed by: NURSE PRACTITIONER

## 2023-05-30 PROCEDURE — 1036F TOBACCO NON-USER: CPT | Performed by: NURSE PRACTITIONER

## 2023-05-30 ASSESSMENT — ENCOUNTER SYMPTOMS
SHORTNESS OF BREATH: 0
DIARRHEA: 0
SORE THROAT: 0
CONSTIPATION: 0
VOMITING: 0
TROUBLE SWALLOWING: 0
EYE PAIN: 0
EYE ITCHING: 0
EYE REDNESS: 0
COLOR CHANGE: 0
NAUSEA: 0
COUGH: 0
ABDOMINAL PAIN: 0
CHEST TIGHTNESS: 0
ABDOMINAL DISTENTION: 0
WHEEZING: 0
SINUS PAIN: 0
RHINORRHEA: 0
APNEA: 0

## 2023-05-30 NOTE — PROGRESS NOTES
Left cervical: No superficial cervical adenopathy. Skin:     General: Skin is warm and dry. Capillary Refill: Capillary refill takes less than 2 seconds. Findings: Rash present. No bruising. Rash is macular and papular. Neurological:      General: No focal deficit present. Mental Status: She is alert and oriented to person, place, and time. Mental status is at baseline. Motor: No weakness. Coordination: Coordination normal.   Psychiatric:         Attention and Perception: Attention and perception normal.         Mood and Affect: Mood and affect normal.         Speech: Speech normal.         Behavior: Behavior normal. Behavior is cooperative. Thought Content: Thought content normal.         Judgment: Judgment normal.       Assessment:       Diagnosis Orders   1. Allergic dermatitis  triamcinolone (KENALOG) 0.1 % ointment            Plan:      No orders of the defined types were placed in this encounter. Orders Placed This Encounter   Medications    triamcinolone (KENALOG) 0.1 % ointment     Sig: Apply topically 2 times daily for 7 days     Dispense:  1 each     Refill:  1     Pt left the [facility] today in stable condition. Pt aware that if rash spreads or worsens to reach out as I can send her an oral steriod. Pt shows no distress and no s/s of infection. Pt aware to apply a thin layer to clean and dry skin and to take cooler to cold showers. Discussed signs and symptoms which require immediate follow-up in ED/call to 911. Patient verbalized understanding. Return if symptoms worsen or fail to improve. Reviewed with the patient: current clinical status, medications, activities and diet. Side effects, adverse effects of the medication prescribed today, as well as treatment plan and result expectations have been discussed with the patient who expresses understanding and desires to proceed.     Close follow up to evaluate treatment results and for coordination of

## 2023-07-26 DIAGNOSIS — I10 ESSENTIAL HYPERTENSION: ICD-10-CM

## 2023-07-27 RX ORDER — AMLODIPINE BESYLATE 10 MG/1
TABLET ORAL
Qty: 90 TABLET | Refills: 1 | Status: SHIPPED | OUTPATIENT
Start: 2023-07-27

## 2023-10-06 DIAGNOSIS — F41.9 ANXIETY: ICD-10-CM

## 2023-10-06 NOTE — TELEPHONE ENCOUNTER
Future Appointments    Encounter Information    Provider Department Appt Notes   10/23/2023 Jacob Ballard, [address] Primary Care 6 month follow up     Past Visits    Date Provider Specialty Visit Type Primary Dx   05/30/2023 Alan Sandhoff, APRN - CNP Family Medicine Office Visit Allergic dermatitis   04/20/2023 NICOLETTE Garcia CNP Family Medicine Office Visit Medicare annual wellness visit, subsequent

## 2023-10-08 RX ORDER — SERTRALINE HYDROCHLORIDE 100 MG/1
TABLET, FILM COATED ORAL
Qty: 90 TABLET | Refills: 10 | Status: SHIPPED | OUTPATIENT
Start: 2023-10-08

## 2023-10-23 ENCOUNTER — OFFICE VISIT (OUTPATIENT)
Dept: FAMILY MEDICINE CLINIC | Age: 83
End: 2023-10-23
Payer: MEDICARE

## 2023-10-23 VITALS
DIASTOLIC BLOOD PRESSURE: 84 MMHG | OXYGEN SATURATION: 95 % | SYSTOLIC BLOOD PRESSURE: 128 MMHG | WEIGHT: 138 LBS | HEART RATE: 85 BPM | HEIGHT: 62 IN | BODY MASS INDEX: 25.4 KG/M2

## 2023-10-23 DIAGNOSIS — R53.83 FATIGUE, UNSPECIFIED TYPE: ICD-10-CM

## 2023-10-23 DIAGNOSIS — I48.19 PERSISTENT ATRIAL FIBRILLATION (HCC): ICD-10-CM

## 2023-10-23 DIAGNOSIS — F41.9 ANXIETY: ICD-10-CM

## 2023-10-23 DIAGNOSIS — Z23 FLU VACCINE NEED: ICD-10-CM

## 2023-10-23 DIAGNOSIS — E03.9 HYPOTHYROIDISM, UNSPECIFIED TYPE: ICD-10-CM

## 2023-10-23 DIAGNOSIS — I10 ESSENTIAL HYPERTENSION: Primary | ICD-10-CM

## 2023-10-23 PROCEDURE — 1036F TOBACCO NON-USER: CPT | Performed by: NURSE PRACTITIONER

## 2023-10-23 PROCEDURE — G8484 FLU IMMUNIZE NO ADMIN: HCPCS | Performed by: NURSE PRACTITIONER

## 2023-10-23 PROCEDURE — 1123F ACP DISCUSS/DSCN MKR DOCD: CPT | Performed by: NURSE PRACTITIONER

## 2023-10-23 PROCEDURE — G8427 DOCREV CUR MEDS BY ELIG CLIN: HCPCS | Performed by: NURSE PRACTITIONER

## 2023-10-23 PROCEDURE — 99214 OFFICE O/P EST MOD 30 MIN: CPT | Performed by: NURSE PRACTITIONER

## 2023-10-23 PROCEDURE — G0008 ADMIN INFLUENZA VIRUS VAC: HCPCS | Performed by: NURSE PRACTITIONER

## 2023-10-23 PROCEDURE — 1090F PRES/ABSN URINE INCON ASSESS: CPT | Performed by: NURSE PRACTITIONER

## 2023-10-23 PROCEDURE — 3074F SYST BP LT 130 MM HG: CPT | Performed by: NURSE PRACTITIONER

## 2023-10-23 PROCEDURE — 90694 VACC AIIV4 NO PRSRV 0.5ML IM: CPT | Performed by: NURSE PRACTITIONER

## 2023-10-23 PROCEDURE — G8399 PT W/DXA RESULTS DOCUMENT: HCPCS | Performed by: NURSE PRACTITIONER

## 2023-10-23 PROCEDURE — 3079F DIAST BP 80-89 MM HG: CPT | Performed by: NURSE PRACTITIONER

## 2023-10-23 PROCEDURE — G8417 CALC BMI ABV UP PARAM F/U: HCPCS | Performed by: NURSE PRACTITIONER

## 2023-10-23 ASSESSMENT — ENCOUNTER SYMPTOMS
DIARRHEA: 0
TROUBLE SWALLOWING: 0
COLOR CHANGE: 0
VOICE CHANGE: 0
VOMITING: 0
NAUSEA: 0
CONSTIPATION: 0
SHORTNESS OF BREATH: 0
PHOTOPHOBIA: 0
EYE PAIN: 0
WHEEZING: 0
CHEST TIGHTNESS: 0
COUGH: 0

## 2023-10-23 NOTE — PROGRESS NOTES
After obtaining consent, and per orders of Dr. Kenneth Siddiqui, injection of Flu given in Left deltoid by Da Au MA. Patient instructed to remain in clinic for 20 minutes afterwards, and to report any adverse reaction to me immediately. Vaccine Information Sheet, \"Influenza - Inactivated\"  given to Charly Callahan, or parent/legal guardian of  Charly Callahan and verbalized understanding. Patient responses:    Have you ever had a reaction to a flu vaccine? No  Are you able to eat eggs without adverse effects? Yes  Do you have any current illness? No  Have you ever had Guillian Hastings Syndrome? No    Flu vaccine given per order. Please see immunization tab.         Tolerated well
Left Ear: External ear normal.      Nose: Nose normal. No congestion or rhinorrhea. Mouth/Throat:      Mouth: Mucous membranes are moist.      Pharynx: No oropharyngeal exudate or posterior oropharyngeal erythema. Eyes:      General:         Right eye: No discharge. Left eye: No discharge. Extraocular Movements: Extraocular movements intact. Conjunctiva/sclera: Conjunctivae normal.      Pupils: Pupils are equal, round, and reactive to light. Cardiovascular:      Rate and Rhythm: Normal rate. Rhythm irregular. Pulses: Normal pulses. Heart sounds: Normal heart sounds. No murmur heard. Pulmonary:      Effort: Pulmonary effort is normal. No respiratory distress. Breath sounds: Normal breath sounds. No wheezing. Abdominal:      General: Abdomen is flat. Palpations: Abdomen is soft. Tenderness: There is no abdominal tenderness. There is no guarding. Musculoskeletal:         General: No swelling or tenderness. Normal range of motion. Cervical back: Normal range of motion and neck supple. No rigidity or tenderness. Lymphadenopathy:      Cervical: No cervical adenopathy. Skin:     General: Skin is warm and dry. Coloration: Skin is not jaundiced or pale. Findings: No erythema. Neurological:      General: No focal deficit present. Mental Status: She is alert and oriented to person, place, and time. Mental status is at baseline. Cranial Nerves: No cranial nerve deficit. Sensory: No sensory deficit. Motor: No weakness. Gait: Gait normal.   Psychiatric:         Mood and Affect: Mood normal.         Behavior: Behavior normal.         Thought Content: Thought content normal.         Judgment: Judgment normal.         Assessment & Plan     Diagnosis Orders   1. Essential hypertension  Comprehensive Metabolic Panel      2. Flu vaccine need  Influenza, FLUAD, (age 72 y+), IM, Preservative Free, 0.5 mL      3.  Hypothyroidism,

## 2023-11-01 DIAGNOSIS — I10 ESSENTIAL HYPERTENSION: ICD-10-CM

## 2023-11-01 RX ORDER — METOPROLOL SUCCINATE 100 MG/1
TABLET, EXTENDED RELEASE ORAL
Qty: 90 TABLET | Refills: 10 | Status: SHIPPED | OUTPATIENT
Start: 2023-11-01

## 2023-11-01 NOTE — TELEPHONE ENCOUNTER
Future Appointments    Encounter Information    Provider Department Appt Notes   4/23/2024 Halley Tello, [address] Primary Care Return in about 6 months (around 4/23/2024) for medicare wellness.      Past Visits    Date Provider Specialty Visit Type Primary Dx   10/23/2023 NICOLETTE Vidal - CNP Family Medicine Office Visit Essential hypertension   05/30/2023 NICOLETTE Pierce CNP Family Medicine Office Visit Allergic dermatitis   04/20/2023 NICOLETTE Vidal - CNP Family Medicine Office Visit Medicare annual wellness visit, subsequent

## 2023-12-01 DIAGNOSIS — I48.19 PERSISTENT ATRIAL FIBRILLATION (HCC): ICD-10-CM

## 2023-12-01 NOTE — TELEPHONE ENCOUNTER
Requesting medication refill. Please approve or deny this request.    Rx requested:  Requested Prescriptions     Pending Prescriptions Disp Refills    apixaban (ELIQUIS) 2.5 MG TABS tablet 180 tablet 0     Sig: Take 1 tablet by mouth 2 times daily         Last Office Visit:   3/26/2021      Next Visit Date:  Future Appointments   Date Time Provider [address] [place] [address]   12/15/2023 11:00 AM Holiday, Asha Rivas, DO [facility]   4/23/2024 10:30 AM NICOLETTE Wolff - CNP [facility] EMERGENCY [facility] AT [place]               Last refill 5/14/23. Please approve or deny.

## 2023-12-15 ENCOUNTER — OFFICE VISIT (OUTPATIENT)
Dept: CARDIOLOGY CLINIC | Age: 83
End: 2023-12-15
Payer: MEDICARE

## 2023-12-15 VITALS
WEIGHT: 140.6 LBS | HEART RATE: 84 BPM | HEIGHT: 62 IN | OXYGEN SATURATION: 93 % | BODY MASS INDEX: 25.88 KG/M2 | SYSTOLIC BLOOD PRESSURE: 162 MMHG | DIASTOLIC BLOOD PRESSURE: 100 MMHG

## 2023-12-15 DIAGNOSIS — I10 BENIGN ESSENTIAL HYPERTENSION: ICD-10-CM

## 2023-12-15 DIAGNOSIS — I48.19 PERSISTENT ATRIAL FIBRILLATION (HCC): Primary | ICD-10-CM

## 2023-12-15 PROCEDURE — 3080F DIAST BP >= 90 MM HG: CPT | Performed by: INTERNAL MEDICINE

## 2023-12-15 PROCEDURE — 1090F PRES/ABSN URINE INCON ASSESS: CPT | Performed by: INTERNAL MEDICINE

## 2023-12-15 PROCEDURE — G8399 PT W/DXA RESULTS DOCUMENT: HCPCS | Performed by: INTERNAL MEDICINE

## 2023-12-15 PROCEDURE — 1123F ACP DISCUSS/DSCN MKR DOCD: CPT | Performed by: INTERNAL MEDICINE

## 2023-12-15 PROCEDURE — 3077F SYST BP >= 140 MM HG: CPT | Performed by: INTERNAL MEDICINE

## 2023-12-15 PROCEDURE — 93000 ELECTROCARDIOGRAM COMPLETE: CPT | Performed by: INTERNAL MEDICINE

## 2023-12-15 PROCEDURE — G8427 DOCREV CUR MEDS BY ELIG CLIN: HCPCS | Performed by: INTERNAL MEDICINE

## 2023-12-15 PROCEDURE — 1036F TOBACCO NON-USER: CPT | Performed by: INTERNAL MEDICINE

## 2023-12-15 PROCEDURE — G8417 CALC BMI ABV UP PARAM F/U: HCPCS | Performed by: INTERNAL MEDICINE

## 2023-12-15 PROCEDURE — G8484 FLU IMMUNIZE NO ADMIN: HCPCS | Performed by: INTERNAL MEDICINE

## 2023-12-15 PROCEDURE — 99213 OFFICE O/P EST LOW 20 MIN: CPT | Performed by: INTERNAL MEDICINE

## 2024-02-29 ENCOUNTER — OFFICE VISIT (OUTPATIENT)
Dept: FAMILY MEDICINE CLINIC | Age: 84
End: 2024-02-29
Payer: MEDICARE

## 2024-02-29 VITALS
HEIGHT: 62 IN | BODY MASS INDEX: 26.13 KG/M2 | HEART RATE: 97 BPM | SYSTOLIC BLOOD PRESSURE: 130 MMHG | DIASTOLIC BLOOD PRESSURE: 70 MMHG | OXYGEN SATURATION: 95 % | WEIGHT: 142 LBS

## 2024-02-29 DIAGNOSIS — H66.002 NON-RECURRENT ACUTE SUPPURATIVE OTITIS MEDIA OF LEFT EAR WITHOUT SPONTANEOUS RUPTURE OF TYMPANIC MEMBRANE: Primary | ICD-10-CM

## 2024-02-29 DIAGNOSIS — H61.22 IMPACTED CERUMEN OF LEFT EAR: ICD-10-CM

## 2024-02-29 DIAGNOSIS — H66.002 NON-RECURRENT ACUTE SUPPURATIVE OTITIS MEDIA OF LEFT EAR WITHOUT SPONTANEOUS RUPTURE OF TYMPANIC MEMBRANE: ICD-10-CM

## 2024-02-29 PROCEDURE — G8484 FLU IMMUNIZE NO ADMIN: HCPCS | Performed by: FAMILY MEDICINE

## 2024-02-29 PROCEDURE — 3075F SYST BP GE 130 - 139MM HG: CPT | Performed by: FAMILY MEDICINE

## 2024-02-29 PROCEDURE — G8417 CALC BMI ABV UP PARAM F/U: HCPCS | Performed by: FAMILY MEDICINE

## 2024-02-29 PROCEDURE — G8427 DOCREV CUR MEDS BY ELIG CLIN: HCPCS | Performed by: FAMILY MEDICINE

## 2024-02-29 PROCEDURE — 1036F TOBACCO NON-USER: CPT | Performed by: FAMILY MEDICINE

## 2024-02-29 PROCEDURE — 1123F ACP DISCUSS/DSCN MKR DOCD: CPT | Performed by: FAMILY MEDICINE

## 2024-02-29 PROCEDURE — 3078F DIAST BP <80 MM HG: CPT | Performed by: FAMILY MEDICINE

## 2024-02-29 PROCEDURE — 69210 REMOVE IMPACTED EAR WAX UNI: CPT | Performed by: FAMILY MEDICINE

## 2024-02-29 PROCEDURE — G8399 PT W/DXA RESULTS DOCUMENT: HCPCS | Performed by: FAMILY MEDICINE

## 2024-02-29 PROCEDURE — 1090F PRES/ABSN URINE INCON ASSESS: CPT | Performed by: FAMILY MEDICINE

## 2024-02-29 PROCEDURE — 99213 OFFICE O/P EST LOW 20 MIN: CPT | Performed by: FAMILY MEDICINE

## 2024-02-29 RX ORDER — AMOXICILLIN AND CLAVULANATE POTASSIUM 875; 125 MG/1; MG/1
1 TABLET, FILM COATED ORAL 2 TIMES DAILY
Qty: 20 TABLET | Refills: 0 | Status: SHIPPED | OUTPATIENT
Start: 2024-02-29 | End: 2024-03-10

## 2024-02-29 RX ORDER — AMOXICILLIN AND CLAVULANATE POTASSIUM 875; 125 MG/1; MG/1
1 TABLET, FILM COATED ORAL 2 TIMES DAILY
Qty: 20 TABLET | Refills: 0 | Status: SHIPPED | OUTPATIENT
Start: 2024-02-29 | End: 2024-02-29 | Stop reason: SDUPTHER

## 2024-02-29 ASSESSMENT — ENCOUNTER SYMPTOMS
VOMITING: 0
SHORTNESS OF BREATH: 0
EYE DISCHARGE: 0
ABDOMINAL PAIN: 0
RHINORRHEA: 0
NAUSEA: 0
EYE REDNESS: 0
DIARRHEA: 0
COUGH: 0
SORE THROAT: 0

## 2024-02-29 ASSESSMENT — PATIENT HEALTH QUESTIONNAIRE - PHQ9
SUM OF ALL RESPONSES TO PHQ9 QUESTIONS 1 & 2: 0
SUM OF ALL RESPONSES TO PHQ QUESTIONS 1-9: 0
2. FEELING DOWN, DEPRESSED OR HOPELESS: 0
SUM OF ALL RESPONSES TO PHQ QUESTIONS 1-9: 0
1. LITTLE INTEREST OR PLEASURE IN DOING THINGS: 0
SUM OF ALL RESPONSES TO PHQ QUESTIONS 1-9: 0

## 2024-02-29 NOTE — PROGRESS NOTES
[facility] SPECIALTY/PRIMARY CARE  [address], [address]  [facility] [address]  Dept: [phone]  Dept Fax: [phone]  Loc: [phone]     2/29/2024    Visit type: Follow up    Reason for Visit: Ear Drainage (Pt c/o constant yellow drainage on her Lt ear that started 2 days. Denies any pain, ringing, bleeding. ) and Health Maintenance (AWV scheduled 04/23/2024, declined Tdap.)         Patient: Inés Pineda is a 84 y.o. female   HPI: 84-year-old female presents with left ear discharge/drainage.  Patient states she does not have full hearing of that ear at baseline.  Patient denies any pain or ringing though has noticed this creamy discharge which caused her concern.  Patient denies any fever or chills.    ASSESSMENT/PLAN   1. Non-recurrent acute suppurative otitis media of left ear without spontaneous rupture of tympanic membrane  -     amoxicillin-clavulanate (AUGMENTIN) 875-125 MG per tablet; Take 1 tablet by mouth 2 times daily for 10 days, Disp-20 tablet, R-0Normal  2. Impacted cerumen of left ear  -     16768 - OR REMOVE IMPACTED EAR WAX  -Patient had significant cerumen impaction causing decreased hearing and ear discomfort.  Discussed consideration for irrigation/lavage and patient agreed, verbal consent was given.  No complications or concerns during the procedure.  After the procedure the tympanic membrane could be visualized and patient had improvement with hearing and discomfort. Discussed use of nsaid or acetaminophen as needed for pain.     -Adverse effects of medications prescribed were discussed, patient acknowledged understanding.  -Patient was advised to let me know if there is no improvement in the next 7 to 10 days. Discussed red flags warranting decision to go to the emergency department and patient understood.     Orders Placed This Encounter   Medications    amoxicillin-clavulanate (AUGMENTIN) 875-125 MG per tablet     Sig: Take 1 tablet by

## 2024-03-29 DIAGNOSIS — I48.19 PERSISTENT ATRIAL FIBRILLATION (HCC): ICD-10-CM

## 2024-04-01 RX ORDER — APIXABAN 2.5 MG/1
TABLET, FILM COATED ORAL
Qty: 180 TABLET | Refills: 3 | OUTPATIENT
Start: 2024-04-01

## 2024-04-06 DIAGNOSIS — I48.19 PERSISTENT ATRIAL FIBRILLATION (HCC): ICD-10-CM

## 2024-04-09 ENCOUNTER — TELEPHONE (OUTPATIENT)
Dept: CARDIOLOGY CLINIC | Age: 84
End: 2024-04-09

## 2024-04-09 DIAGNOSIS — I48.19 PERSISTENT ATRIAL FIBRILLATION (HCC): ICD-10-CM

## 2024-04-09 NOTE — TELEPHONE ENCOUNTER
Pt came in office for samples of Eliquis 2.5 MG  Pt given 4 boxes of Eliquis 2.5 MG.    LOT: [id number]  EXP: APR 2025

## 2024-04-23 ENCOUNTER — OFFICE VISIT (OUTPATIENT)
Dept: FAMILY MEDICINE CLINIC | Age: 84
End: 2024-04-23
Payer: MEDICARE

## 2024-04-23 VITALS
HEIGHT: 62 IN | OXYGEN SATURATION: 96 % | BODY MASS INDEX: 25.62 KG/M2 | WEIGHT: 139.2 LBS | HEART RATE: 74 BPM | DIASTOLIC BLOOD PRESSURE: 86 MMHG | TEMPERATURE: 98.2 F | SYSTOLIC BLOOD PRESSURE: 132 MMHG

## 2024-04-23 DIAGNOSIS — R53.83 FATIGUE, UNSPECIFIED TYPE: ICD-10-CM

## 2024-04-23 DIAGNOSIS — I10 ESSENTIAL HYPERTENSION: ICD-10-CM

## 2024-04-23 DIAGNOSIS — E03.9 HYPOTHYROIDISM, UNSPECIFIED TYPE: ICD-10-CM

## 2024-04-23 DIAGNOSIS — E55.9 VITAMIN D DEFICIENCY: ICD-10-CM

## 2024-04-23 DIAGNOSIS — Z00.00 MEDICARE ANNUAL WELLNESS VISIT, SUBSEQUENT: Primary | ICD-10-CM

## 2024-04-23 LAB
ALBUMIN SERPL-MCNC: 4.8 G/DL (ref 3.5–4.6)
ALP SERPL-CCNC: 67 U/L (ref 40–130)
ALT SERPL-CCNC: 15 U/L (ref 0–33)
ANION GAP SERPL CALCULATED.3IONS-SCNC: 9 MEQ/L (ref 9–15)
AST SERPL-CCNC: 23 U/L (ref 0–35)
BILIRUB SERPL-MCNC: 0.6 MG/DL (ref 0.2–0.7)
BUN SERPL-MCNC: 22 MG/DL (ref 8–23)
CALCIUM SERPL-MCNC: 10 MG/DL (ref 8.5–9.9)
CHLORIDE SERPL-SCNC: 104 MEQ/L (ref 95–107)
CO2 SERPL-SCNC: 28 MEQ/L (ref 20–31)
CREAT SERPL-MCNC: 0.72 MG/DL (ref 0.5–0.9)
ERYTHROCYTE [DISTWIDTH] IN BLOOD BY AUTOMATED COUNT: 12.8 % (ref 11.5–14.5)
GLOBULIN SER CALC-MCNC: 3 G/DL (ref 2.3–3.5)
GLUCOSE SERPL-MCNC: 91 MG/DL (ref 70–99)
HCT VFR BLD AUTO: 49.2 % (ref 37–47)
HGB BLD-MCNC: 16.3 G/DL (ref 12–16)
MCH RBC QN AUTO: 31 PG (ref 27–31.3)
MCHC RBC AUTO-ENTMCNC: 33.1 % (ref 33–37)
MCV RBC AUTO: 93.5 FL (ref 79.4–94.8)
PLATELET # BLD AUTO: 234 K/UL (ref 130–400)
POTASSIUM SERPL-SCNC: 5 MEQ/L (ref 3.4–4.9)
PROT SERPL-MCNC: 7.8 G/DL (ref 6.3–8)
RBC # BLD AUTO: 5.26 M/UL (ref 4.2–5.4)
SODIUM SERPL-SCNC: 141 MEQ/L (ref 135–144)
TSH REFLEX: 3.14 UIU/ML (ref 0.44–3.86)
WBC # BLD AUTO: 5.5 K/UL (ref 4.8–10.8)

## 2024-04-23 PROCEDURE — 3079F DIAST BP 80-89 MM HG: CPT | Performed by: NURSE PRACTITIONER

## 2024-04-23 PROCEDURE — 3075F SYST BP GE 130 - 139MM HG: CPT | Performed by: NURSE PRACTITIONER

## 2024-04-23 PROCEDURE — 1123F ACP DISCUSS/DSCN MKR DOCD: CPT | Performed by: NURSE PRACTITIONER

## 2024-04-23 PROCEDURE — G0439 PPPS, SUBSEQ VISIT: HCPCS | Performed by: NURSE PRACTITIONER

## 2024-04-23 SDOH — ECONOMIC STABILITY: FOOD INSECURITY: WITHIN THE PAST 12 MONTHS, YOU WORRIED THAT YOUR FOOD WOULD RUN OUT BEFORE YOU GOT MONEY TO BUY MORE.: NEVER TRUE

## 2024-04-23 SDOH — ECONOMIC STABILITY: INCOME INSECURITY: HOW HARD IS IT FOR YOU TO PAY FOR THE VERY BASICS LIKE FOOD, HOUSING, MEDICAL CARE, AND HEATING?: NOT HARD AT ALL

## 2024-04-23 SDOH — ECONOMIC STABILITY: FOOD INSECURITY: WITHIN THE PAST 12 MONTHS, THE FOOD YOU BOUGHT JUST DIDN'T LAST AND YOU DIDN'T HAVE MONEY TO GET MORE.: NEVER TRUE

## 2024-04-23 ASSESSMENT — PATIENT HEALTH QUESTIONNAIRE - PHQ9
1. LITTLE INTEREST OR PLEASURE IN DOING THINGS: NOT AT ALL
2. FEELING DOWN, DEPRESSED OR HOPELESS: NOT AT ALL
SUM OF ALL RESPONSES TO PHQ QUESTIONS 1-9: 0
SUM OF ALL RESPONSES TO PHQ9 QUESTIONS 1 & 2: 0
SUM OF ALL RESPONSES TO PHQ QUESTIONS 1-9: 0

## 2024-04-23 ASSESSMENT — LIFESTYLE VARIABLES
HOW OFTEN DO YOU HAVE A DRINK CONTAINING ALCOHOL: NEVER
HOW MANY STANDARD DRINKS CONTAINING ALCOHOL DO YOU HAVE ON A TYPICAL DAY: PATIENT DOES NOT DRINK

## 2024-04-23 NOTE — PROGRESS NOTES
Medicare Annual Wellness Visit    Inés Pineda is here for Medicare AWV (No concerns)    Assessment & Plan   Medicare annual wellness visit, subsequent  Hypothyroidism, unspecified type  -     TSH with Reflex; Future  Essential hypertension  -     Comprehensive Metabolic Panel; Future  Vitamin D deficiency  -     Vitamin D 25 Hydroxy; Future  Fatigue, unspecified type  -     CBC; Future    Recommendations for Preventive Services Due: see orders and patient instructions/AVS.  Recommended screening schedule for the next 5-10 years is provided to the patient in written form: see Patient Instructions/AVS.     No follow-ups on file.     Subjective   The following acute and/or chronic problems were also addressed today:  Has noticed that nails are splitting and cracking. This is the only concern she has  Overall pt notes that she has been feeling \"great\" and is \"very fortunate\"    Patient's complete Health Risk Assessment and screening values have been reviewed and are found in Flowsheets. The following problems were reviewed today and where indicated follow up appointments were made and/or referrals ordered.                  Hearing Screen:  Do you or your family notice any trouble with your hearing that hasn't been managed with hearing aids?: (!) Yes (hearing aids)    Interventions:  Patient comments: wears hearing aides                Objective   Vitals:    04/23/24 1022   BP: 132/86   Pulse: 74   Temp: 98.2 °F (36.8 °C)   SpO2: 96%   Weight: 63.1 kg (139 lb 3.2 oz)   Height: 1.575 m (5' 2\")      Body mass index is 25.46 kg/m².      General Appearance: alert and oriented to person, place and time, well developed and well- nourished, in no acute distress  Skin: warm and dry, no rash or erythema  Head: normocephalic and atraumatic  Eyes: pupils equal, round, and reactive to light, extraocular eye movements intact, conjunctivae normal  Neck: supple and non-tender without mass, no thyromegaly or thyroid nodules, no

## 2024-04-24 LAB — VITAMIN D 25-HYDROXY: 32.8 NG/ML (ref 30–100)

## 2024-06-08 ENCOUNTER — HOSPITAL ENCOUNTER (EMERGENCY)
Age: 84
Discharge: HOME | End: 2024-06-08
Payer: OTHER MISCELLANEOUS

## 2024-06-08 ENCOUNTER — APPOINTMENT (OUTPATIENT)
Dept: CT IMAGING | Age: 84
End: 2024-06-08
Payer: OTHER MISCELLANEOUS

## 2024-06-08 VITALS
DIASTOLIC BLOOD PRESSURE: 55 MMHG | OXYGEN SATURATION: 95 % | BODY MASS INDEX: 25.76 KG/M2 | SYSTOLIC BLOOD PRESSURE: 116 MMHG | HEIGHT: 62 IN | HEART RATE: 82 BPM | TEMPERATURE: 97.9 F | RESPIRATION RATE: 20 BRPM | WEIGHT: 140 LBS

## 2024-06-08 DIAGNOSIS — S20.219A CONTUSION OF CHEST WALL, UNSPECIFIED LATERALITY, INITIAL ENCOUNTER: ICD-10-CM
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
## 2024-06-08 DIAGNOSIS — S39.012A STRAIN OF LUMBAR REGION, INITIAL ENCOUNTER: ICD-10-CM

## 2024-06-08 DIAGNOSIS — S16.1XXA ACUTE STRAIN OF NECK MUSCLE, INITIAL ENCOUNTER: ICD-10-CM

## 2024-06-08 LAB
ABO + RH BLD: NORMAL
ALBUMIN SERPL-MCNC: 4.5 G/DL (ref 3.5–4.6)
ALP SERPL-CCNC: 76 U/L (ref 40–130)
ALT SERPL-CCNC: 24 U/L (ref 0–33)
ANION GAP SERPL CALCULATED.3IONS-SCNC: 9 MEQ/L (ref 9–15)
APTT PPP: 30.6 SEC (ref 24.4–36.8)
AST SERPL-CCNC: 29 U/L (ref 0–35)
BASOPHILS # BLD: 0.1 K/UL (ref 0–0.2)
BASOPHILS NFR BLD: 2 %
BILIRUB SERPL-MCNC: 0.7 MG/DL (ref 0.2–0.7)
BLD GP AB SCN SERPL QL: NORMAL
BUN SERPL-MCNC: 23 MG/DL (ref 8–23)
CALCIUM SERPL-MCNC: 9.5 MG/DL (ref 8.5–9.9)
CHLORIDE SERPL-SCNC: 104 MEQ/L (ref 95–107)
CO2 SERPL-SCNC: 26 MEQ/L (ref 20–31)
CREAT SERPL-MCNC: 0.58 MG/DL (ref 0.5–0.9)
EOSINOPHIL # BLD: 0.1 K/UL (ref 0–0.7)
EOSINOPHIL NFR BLD: 1.5 %
ERYTHROCYTE [DISTWIDTH] IN BLOOD BY AUTOMATED COUNT: 12.9 % (ref 11.5–14.5)
ETHANOL PERCENT: NORMAL G/DL
ETHANOLAMINE SERPL-MCNC: <10 MG/DL (ref 0–0.08)
GLOBULIN SER CALC-MCNC: 2.6 G/DL (ref 2.3–3.5)
GLUCOSE SERPL-MCNC: 142 MG/DL (ref 70–99)
HCT VFR BLD AUTO: 43.4 % (ref 37–47)
HGB BLD-MCNC: 14.7 G/DL (ref 12–16)
INR PPP: 1.1
LYMPHOCYTES # BLD: 0.7 K/UL (ref 1–4.8)
LYMPHOCYTES NFR BLD: 13.5 %
MCH RBC QN AUTO: 31.5 PG (ref 27–31.3)
MCHC RBC AUTO-ENTMCNC: 33.9 % (ref 33–37)
MCV RBC AUTO: 92.9 FL (ref 79.4–94.8)
MONOCYTES # BLD: 0.7 K/UL (ref 0.2–0.8)
MONOCYTES NFR BLD: 12 %
NEUTROPHILS # BLD: 3.9 K/UL (ref 1.4–6.5)
NEUTS SEG NFR BLD: 70.1 %
PLATELET # BLD AUTO: 189 K/UL (ref 130–400)
POTASSIUM SERPL-SCNC: 4 MEQ/L (ref 3.4–4.9)
PROT SERPL-MCNC: 7.1 G/DL (ref 6.3–8)
PROTHROMBIN TIME: 14.5 SEC (ref 12.3–14.9)
RBC # BLD AUTO: 4.67 M/UL (ref 4.2–5.4)
SODIUM SERPL-SCNC: 139 MEQ/L (ref 135–144)
WBC # BLD AUTO: 5.5 K/UL (ref 4.8–10.8)

## 2024-06-08 PROCEDURE — 80053 COMPREHEN METABOLIC PANEL: CPT

## 2024-06-08 PROCEDURE — 86901 BLOOD TYPING SEROLOGIC RH(D): CPT

## 2024-06-08 PROCEDURE — 74177 CT ABD & PELVIS W/CONTRAST: CPT

## 2024-06-08 PROCEDURE — 85730 THROMBOPLASTIN TIME PARTIAL: CPT

## 2024-06-08 PROCEDURE — 72125 CT NECK SPINE W/O DYE: CPT

## 2024-06-08 PROCEDURE — 72131 CT LUMBAR SPINE W/O DYE: CPT

## 2024-06-08 PROCEDURE — 85610 PROTHROMBIN TIME: CPT

## 2024-06-08 PROCEDURE — 6360000004 HC RX CONTRAST MEDICATION

## 2024-06-08 PROCEDURE — 72128 CT CHEST SPINE W/O DYE: CPT

## 2024-06-08 PROCEDURE — 36415 COLL VENOUS BLD VENIPUNCTURE: CPT

## 2024-06-08 PROCEDURE — 6830039000 HC L3 TRAUMA ALERT

## 2024-06-08 PROCEDURE — 82077 ASSAY SPEC XCP UR&BREATH IA: CPT

## 2024-06-08 PROCEDURE — 70450 CT HEAD/BRAIN W/O DYE: CPT

## 2024-06-08 PROCEDURE — 71260 CT THORAX DX C+: CPT

## 2024-06-08 PROCEDURE — 99285 EMERGENCY DEPT VISIT HI MDM: CPT

## 2024-06-08 PROCEDURE — 85025 COMPLETE CBC W/AUTO DIFF WBC: CPT

## 2024-06-08 PROCEDURE — 86850 RBC ANTIBODY SCREEN: CPT

## 2024-06-08 PROCEDURE — 86900 BLOOD TYPING SEROLOGIC ABO: CPT

## 2024-06-08 RX ADMIN — IOPAMIDOL 75 ML: 755 INJECTION, SOLUTION INTRAVENOUS at 13:41

## 2024-06-08 ASSESSMENT — ENCOUNTER SYMPTOMS
ABDOMINAL PAIN: 0
ABDOMINAL DISTENTION: 0
SORE THROAT: 0
EYE DISCHARGE: 0
CONSTIPATION: 0
RHINORRHEA: 0
COLOR CHANGE: 0
BACK PAIN: 1
SHORTNESS OF BREATH: 0

## 2024-06-08 ASSESSMENT — LIFESTYLE VARIABLES
HOW MANY STANDARD DRINKS CONTAINING ALCOHOL DO YOU HAVE ON A TYPICAL DAY: PATIENT DOES NOT DRINK
HOW OFTEN DO YOU HAVE A DRINK CONTAINING ALCOHOL: NEVER

## 2024-06-08 ASSESSMENT — PAIN DESCRIPTION - LOCATION: LOCATION: NECK;BACK

## 2024-06-08 ASSESSMENT — PAIN SCALES - GENERAL
PAINLEVEL_OUTOF10: 7
PAINLEVEL_OUTOF10: 3

## 2024-06-08 ASSESSMENT — PAIN - FUNCTIONAL ASSESSMENT: PAIN_FUNCTIONAL_ASSESSMENT: 0-10
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
## 2024-06-08 NOTE — ED NOTES
Pt lying quietly c-collar on and ct called   I stat creat 0.6   Pt does not really c/o pain she states sore and unable to be specific

## 2024-06-08 NOTE — ED NOTES
Pt placed on bedpan voided without difficulty.  Pt voided 300ml of clear yellow urine.  Family at bedside and family member was given her purse.
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
## 2024-06-09 LAB
PERFORMED ON: NORMAL
POC CREATININE: 0.6 MG/DL (ref 0.6–1.2)
POC SAMPLE TYPE: NORMAL

## 2024-06-11 ENCOUNTER — OFFICE VISIT (OUTPATIENT)
Dept: FAMILY MEDICINE CLINIC | Age: 84
End: 2024-06-11
Payer: MEDICARE

## 2024-06-11 VITALS
HEART RATE: 94 BPM | OXYGEN SATURATION: 93 % | HEIGHT: 62 IN | WEIGHT: 137 LBS | SYSTOLIC BLOOD PRESSURE: 130 MMHG | DIASTOLIC BLOOD PRESSURE: 76 MMHG | BODY MASS INDEX: 25.21 KG/M2

## 2024-06-11 DIAGNOSIS — M25.571 ACUTE RIGHT ANKLE PAIN: ICD-10-CM

## 2024-06-11 DIAGNOSIS — R09.89 PULMONARY VASCULAR CONGESTION: ICD-10-CM
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
## 2024-06-11 PROCEDURE — 1090F PRES/ABSN URINE INCON ASSESS: CPT | Performed by: FAMILY MEDICINE

## 2024-06-11 PROCEDURE — 3078F DIAST BP <80 MM HG: CPT | Performed by: FAMILY MEDICINE

## 2024-06-11 PROCEDURE — 1123F ACP DISCUSS/DSCN MKR DOCD: CPT | Performed by: FAMILY MEDICINE

## 2024-06-11 PROCEDURE — 99215 OFFICE O/P EST HI 40 MIN: CPT | Performed by: FAMILY MEDICINE

## 2024-06-11 PROCEDURE — G8417 CALC BMI ABV UP PARAM F/U: HCPCS | Performed by: FAMILY MEDICINE

## 2024-06-11 PROCEDURE — 1036F TOBACCO NON-USER: CPT | Performed by: FAMILY MEDICINE

## 2024-06-11 PROCEDURE — G8427 DOCREV CUR MEDS BY ELIG CLIN: HCPCS | Performed by: FAMILY MEDICINE

## 2024-06-11 PROCEDURE — 3075F SYST BP GE 130 - 139MM HG: CPT | Performed by: FAMILY MEDICINE

## 2024-06-11 PROCEDURE — G8399 PT W/DXA RESULTS DOCUMENT: HCPCS | Performed by: FAMILY MEDICINE

## 2024-06-11 RX ORDER — FUROSEMIDE 20 MG/1
20 TABLET ORAL DAILY
Qty: 7 TABLET | Refills: 0 | Status: SHIPPED | OUTPATIENT
Start: 2024-06-11 | End: 2024-06-18

## 2024-06-11 ASSESSMENT — ENCOUNTER SYMPTOMS
SORE THROAT: 0
EYE PAIN: 0
COUGH: 0
VOMITING: 0
ABDOMINAL PAIN: 0
RHINORRHEA: 0
SHORTNESS OF BREATH: 0
CHEST TIGHTNESS: 0
DIARRHEA: 0
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
## 2024-06-19 ENCOUNTER — OFFICE VISIT (OUTPATIENT)
Dept: FAMILY MEDICINE CLINIC | Age: 84
End: 2024-06-19

## 2024-06-19 VITALS
HEART RATE: 67 BPM | HEIGHT: 62 IN | OXYGEN SATURATION: 93 % | SYSTOLIC BLOOD PRESSURE: 126 MMHG | WEIGHT: 134.4 LBS | TEMPERATURE: 98.1 F | DIASTOLIC BLOOD PRESSURE: 70 MMHG | BODY MASS INDEX: 24.73 KG/M2

## 2024-06-19 DIAGNOSIS — S20.212S CONTUSION OF LEFT CHEST WALL, SEQUELA: ICD-10-CM

## 2024-06-19 DIAGNOSIS — M79.89 RIGHT LEG SWELLING: ICD-10-CM

## 2024-06-19 DIAGNOSIS — R22.1 NECK SWELLING: ICD-10-CM
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
## 2024-06-19 DIAGNOSIS — M54.2 NECK PAIN: ICD-10-CM

## 2024-06-19 ASSESSMENT — ENCOUNTER SYMPTOMS
SHORTNESS OF BREATH: 0
COUGH: 0
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
## 2024-06-20 ENCOUNTER — HOSPITAL ENCOUNTER (OUTPATIENT)
Dept: ULTRASOUND IMAGING | Age: 84
Discharge: HOME | End: 2024-06-22
Payer: OTHER MISCELLANEOUS

## 2024-06-20 DIAGNOSIS — R22.1 NECK SWELLING: ICD-10-CM

## 2024-06-20 PROCEDURE — 76999 ECHO EXAMINATION PROCEDURE: CPT

## 2024-07-03 ENCOUNTER — OFFICE VISIT (OUTPATIENT)
Dept: FAMILY MEDICINE CLINIC | Age: 84
End: 2024-07-03

## 2024-07-03 VITALS
OXYGEN SATURATION: 100 % | BODY MASS INDEX: 24.4 KG/M2 | SYSTOLIC BLOOD PRESSURE: 128 MMHG | WEIGHT: 132.6 LBS | HEIGHT: 62 IN | TEMPERATURE: 98.7 F | HEART RATE: 80 BPM | DIASTOLIC BLOOD PRESSURE: 90 MMHG

## 2024-07-03 DIAGNOSIS — S20.212S CONTUSION OF LEFT CHEST WALL, SEQUELA: ICD-10-CM
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
## 2024-07-03 DIAGNOSIS — R22.1 NECK SWELLING: Primary | ICD-10-CM

## 2024-07-03 DIAGNOSIS — M54.2 NECK PAIN: ICD-10-CM

## 2024-07-03 PROCEDURE — 99213 OFFICE O/P EST LOW 20 MIN: CPT | Performed by: NURSE PRACTITIONER

## 2024-07-03 PROCEDURE — 3074F SYST BP LT 130 MM HG: CPT | Performed by: NURSE PRACTITIONER

## 2024-07-03 PROCEDURE — 1123F ACP DISCUSS/DSCN MKR DOCD: CPT | Performed by: NURSE PRACTITIONER

## 2024-07-03 PROCEDURE — 3080F DIAST BP >= 90 MM HG: CPT | Performed by: NURSE PRACTITIONER

## 2024-07-03 ASSESSMENT — ENCOUNTER SYMPTOMS
CONSTIPATION: 0
DIARRHEA: 0
COUGH: 0
SHORTNESS OF BREATH: 0
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
## 2024-07-08 DIAGNOSIS — E03.9 HYPOTHYROIDISM, UNSPECIFIED TYPE: ICD-10-CM

## 2024-07-08 RX ORDER — LEVOTHYROXINE SODIUM 0.03 MG/1
25 TABLET ORAL DAILY
Qty: 90 TABLET | Refills: 1 | Status: SHIPPED | OUTPATIENT
Start: 2024-07-08

## 2024-09-10 ENCOUNTER — OFFICE VISIT (OUTPATIENT)
Dept: FAMILY MEDICINE CLINIC | Age: 84
End: 2024-09-10
Payer: MEDICARE

## 2024-09-10 VITALS
HEIGHT: 62 IN | OXYGEN SATURATION: 94 % | BODY MASS INDEX: 24.69 KG/M2 | TEMPERATURE: 97.9 F | WEIGHT: 134.2 LBS | HEART RATE: 79 BPM | SYSTOLIC BLOOD PRESSURE: 126 MMHG | DIASTOLIC BLOOD PRESSURE: 84 MMHG

## 2024-09-10 DIAGNOSIS — I48.19 PERSISTENT ATRIAL FIBRILLATION (HCC): ICD-10-CM

## 2024-09-10 DIAGNOSIS — T14.8XXA HEMATOMA: Primary | ICD-10-CM

## 2024-09-10 DIAGNOSIS — I10 ESSENTIAL HYPERTENSION: ICD-10-CM

## 2024-09-10 DIAGNOSIS — L98.8 AGE-RELATED FACIAL WRINKLES: ICD-10-CM

## 2024-09-10 PROCEDURE — 3074F SYST BP LT 130 MM HG: CPT | Performed by: NURSE PRACTITIONER

## 2024-09-10 PROCEDURE — 1123F ACP DISCUSS/DSCN MKR DOCD: CPT | Performed by: NURSE PRACTITIONER

## 2024-09-10 PROCEDURE — 99213 OFFICE O/P EST LOW 20 MIN: CPT | Performed by: NURSE PRACTITIONER

## 2024-09-10 PROCEDURE — G8420 CALC BMI NORM PARAMETERS: HCPCS | Performed by: NURSE PRACTITIONER

## 2024-09-10 PROCEDURE — 3079F DIAST BP 80-89 MM HG: CPT | Performed by: NURSE PRACTITIONER

## 2024-09-10 PROCEDURE — 1090F PRES/ABSN URINE INCON ASSESS: CPT | Performed by: NURSE PRACTITIONER

## 2024-09-10 PROCEDURE — G8399 PT W/DXA RESULTS DOCUMENT: HCPCS | Performed by: NURSE PRACTITIONER

## 2024-09-10 PROCEDURE — 1036F TOBACCO NON-USER: CPT | Performed by: NURSE PRACTITIONER

## 2024-09-10 PROCEDURE — G8427 DOCREV CUR MEDS BY ELIG CLIN: HCPCS | Performed by: NURSE PRACTITIONER

## 2024-09-10 RX ORDER — TRETINOIN 1 MG/G
CREAM TOPICAL NIGHTLY
Qty: 45 G | Refills: 5 | Status: SHIPPED | OUTPATIENT
Start: 2024-09-10

## 2024-09-10 RX ORDER — AMLODIPINE BESYLATE 10 MG/1
TABLET ORAL
Qty: 90 TABLET | Refills: 1 | Status: SHIPPED | OUTPATIENT
Start: 2024-09-10

## 2024-09-10 RX ORDER — TRETINOIN 1 MG/G
CREAM TOPICAL NIGHTLY
COMMUNITY
End: 2024-09-10 | Stop reason: SDUPTHER

## 2024-09-10 ASSESSMENT — ENCOUNTER SYMPTOMS
SHORTNESS OF BREATH: 0
COUGH: 0

## 2024-09-11 ENCOUNTER — TELEPHONE (OUTPATIENT)
Dept: FAMILY MEDICINE CLINIC | Age: 84
End: 2024-09-11

## 2024-10-10 ENCOUNTER — OFFICE VISIT (OUTPATIENT)
Dept: FAMILY MEDICINE CLINIC | Age: 84
End: 2024-10-10

## 2024-10-10 VITALS
TEMPERATURE: 97.9 F | WEIGHT: 132.8 LBS | HEART RATE: 78 BPM | SYSTOLIC BLOOD PRESSURE: 110 MMHG | OXYGEN SATURATION: 94 % | BODY MASS INDEX: 24.44 KG/M2 | DIASTOLIC BLOOD PRESSURE: 78 MMHG | HEIGHT: 62 IN

## 2024-10-10 DIAGNOSIS — E03.9 HYPOTHYROIDISM, UNSPECIFIED TYPE: ICD-10-CM

## 2024-10-10 DIAGNOSIS — H60.502 ACUTE OTITIS EXTERNA OF LEFT EAR, UNSPECIFIED TYPE: Primary | ICD-10-CM

## 2024-10-10 DIAGNOSIS — D64.9 ANEMIA, UNSPECIFIED TYPE: ICD-10-CM

## 2024-10-10 DIAGNOSIS — I10 ESSENTIAL HYPERTENSION: ICD-10-CM

## 2024-10-10 DIAGNOSIS — Z23 NEED FOR INFLUENZA VACCINATION: ICD-10-CM

## 2024-10-10 LAB
ALBUMIN SERPL-MCNC: 4.5 G/DL (ref 3.5–4.6)
ALP SERPL-CCNC: 83 U/L (ref 40–130)
ALT SERPL-CCNC: 15 U/L (ref 0–33)
ANION GAP SERPL CALCULATED.3IONS-SCNC: 13 MEQ/L (ref 9–15)
AST SERPL-CCNC: 19 U/L (ref 0–35)
BILIRUB SERPL-MCNC: 0.7 MG/DL (ref 0.2–0.7)
BUN SERPL-MCNC: 15 MG/DL (ref 8–23)
CALCIUM SERPL-MCNC: 9.8 MG/DL (ref 8.5–9.9)
CHLORIDE SERPL-SCNC: 99 MEQ/L (ref 95–107)
CO2 SERPL-SCNC: 30 MEQ/L (ref 20–31)
CREAT SERPL-MCNC: 0.67 MG/DL (ref 0.5–0.9)
ERYTHROCYTE [DISTWIDTH] IN BLOOD BY AUTOMATED COUNT: 12.9 % (ref 11.5–14.5)
GLOBULIN SER CALC-MCNC: 3.1 G/DL (ref 2.3–3.5)
GLUCOSE SERPL-MCNC: 91 MG/DL (ref 70–99)
HCT VFR BLD AUTO: 46.5 % (ref 37–47)
HGB BLD-MCNC: 15.2 G/DL (ref 12–16)
MCH RBC QN AUTO: 30.4 PG (ref 27–31.3)
MCHC RBC AUTO-ENTMCNC: 32.7 % (ref 33–37)
MCV RBC AUTO: 93 FL (ref 79.4–94.8)
PLATELET # BLD AUTO: 213 K/UL (ref 130–400)
POTASSIUM SERPL-SCNC: 4.1 MEQ/L (ref 3.4–4.9)
PROT SERPL-MCNC: 7.6 G/DL (ref 6.3–8)
RBC # BLD AUTO: 5 M/UL (ref 4.2–5.4)
SODIUM SERPL-SCNC: 142 MEQ/L (ref 135–144)
TSH REFLEX: 3.93 UIU/ML (ref 0.44–3.86)
WBC # BLD AUTO: 5.5 K/UL (ref 4.8–10.8)

## 2024-10-10 RX ORDER — CIPROFLOXACIN AND DEXAMETHASONE 3; 1 MG/ML; MG/ML
4 SUSPENSION/ DROPS AURICULAR (OTIC) 2 TIMES DAILY
Qty: 7.5 ML | Refills: 0 | Status: SHIPPED | OUTPATIENT
Start: 2024-10-10

## 2024-10-10 ASSESSMENT — ENCOUNTER SYMPTOMS
COUGH: 0
SHORTNESS OF BREATH: 0

## 2024-10-10 NOTE — PROGRESS NOTES
After obtaining consent, and per orders of Dr. Jaquelin YOUNG, injection of FLU given in Left deltoid by Tory Xiong MA. Patient instructed to remain in clinic for 20 minutes afterwards, and to report any adverse reaction to me immediately.     Vaccine Information Sheet, \"Influenza - Inactivated\"  given to Inés Pineda, or parent/legal guardian of  Inés Pineda and verbalized understanding.    Patient responses:    Have you ever had a reaction to a flu vaccine? No  Are you able to eat eggs without adverse effects?  No  Do you have any current illness?  No  Have you ever had Guillian Afton Syndrome?  No    Flu vaccine given per order. Please see immunization tab.           
        Assessment & Plan     Diagnosis Orders   1. Acute otitis externa of left ear, unspecified type  ciprofloxacin-dexAMETHasone (CIPRODEX) 0.3-0.1 % otic suspension      2. Need for influenza vaccination  Influenza, FLUAD Trivalent, (age 65 y+), IM, Preservative Free, 0.5mL      3. Essential hypertension  Comprehensive Metabolic Panel      4. Hypothyroidism, unspecified type  TSH with Reflex      5. Anemia, unspecified type  CBC          Orders Placed This Encounter   Procedures    Influenza, FLUAD Trivalent, (age 65 y+), IM, Preservative Free, 0.5mL    TSH with Reflex     Standing Status:   Future     Standing Expiration Date:   10/10/2025    Comprehensive Metabolic Panel     Standing Status:   Future     Standing Expiration Date:   10/10/2025    CBC     Standing Status:   Future     Standing Expiration Date:   10/10/2025       Orders Placed This Encounter   Medications    ciprofloxacin-dexAMETHasone (CIPRODEX) 0.3-0.1 % otic suspension     Sig: Place 4 drops into the left ear 2 times daily     Dispense:  7.5 mL     Refill:  0       Medications Discontinued During This Encounter   Medication Reason    PREVIDENT 5000 BOOSTER PLUS 1.1 % PSTE     tretinoin (RETIN-A) 0.1 % cream         Return in about 4 months (around 2/10/2025).  Side effects, adverse effects of the medication prescribed today, as well as treatment plan/ rationale and result expectations have been discussed with the patient who expresses understanding and desires to proceed.    Close follow up to evaluate treatment results and for coordination of care.  I have reviewed the patient's medical history in detail and updated the computerized patient record.    As always, patient is advised that if symptoms worsen in any way they will proceed to the nearest emergency room.     FU in 3-4 mos.      Sandra Hammer, APRN - CNP

## 2024-10-18 ENCOUNTER — TELEPHONE (OUTPATIENT)
Dept: FAMILY MEDICINE CLINIC | Age: 84
End: 2024-10-18

## 2024-10-18 NOTE — TELEPHONE ENCOUNTER
Called [facility], informed that we have faxed over multiple times to Medical records in regards to their request.      Medical records fax number and billing phone number were given to the [facility] rep.

## 2024-10-21 DIAGNOSIS — F41.9 ANXIETY: ICD-10-CM

## 2024-10-21 RX ORDER — SERTRALINE HYDROCHLORIDE 100 MG/1
TABLET, FILM COATED ORAL
Qty: 90 TABLET | Refills: 3 | Status: SHIPPED | OUTPATIENT
Start: 2024-10-21

## 2024-10-21 NOTE — TELEPHONE ENCOUNTER
Comments:     Last Office Visit (last PCP visit):   10/10/2024    Next Visit Date:  Future Appointments   Date Time Provider Department Center   11/15/2024  9:15 AM Holiday, DO [facility]   2/10/2025 10:00 AM Sandra Hammer APRN - CNP [facility]   4/24/2025 11:30 AM Sandra Hammer APRN - CNP [facility]       **If hasn't been seen in over a year OR hasn't followed up according to last diabetes/ADHD visit, make appointment for patient before sending refill to provider.    Rx requested:  Requested Prescriptions     Pending Prescriptions Disp Refills    sertraline (ZOLOFT) 100 MG tablet [Pharmacy Med Name: Sertraline HCl Oral Tablet 100 MG] 90 tablet 3     Sig: TAKE 1 TABLET EVERY DAY

## 2024-11-15 ENCOUNTER — OFFICE VISIT (OUTPATIENT)
Dept: CARDIOLOGY CLINIC | Age: 84
End: 2024-11-15
Payer: MEDICARE

## 2024-11-15 VITALS
DIASTOLIC BLOOD PRESSURE: 86 MMHG | BODY MASS INDEX: 24.69 KG/M2 | WEIGHT: 135 LBS | SYSTOLIC BLOOD PRESSURE: 134 MMHG | HEART RATE: 90 BPM

## 2024-11-15 DIAGNOSIS — I48.19 PERSISTENT ATRIAL FIBRILLATION (HCC): Primary | ICD-10-CM

## 2024-11-15 DIAGNOSIS — I10 BENIGN ESSENTIAL HYPERTENSION: ICD-10-CM

## 2024-11-15 DIAGNOSIS — I10 ESSENTIAL HYPERTENSION: ICD-10-CM

## 2024-11-15 PROBLEM — H61.23 BILATERAL IMPACTED CERUMEN: Status: RESOLVED | Noted: 2021-06-15 | Resolved: 2024-11-15

## 2024-11-15 PROBLEM — R04.0 EPISTAXIS: Status: RESOLVED | Noted: 2017-03-14 | Resolved: 2024-11-15

## 2024-11-15 PROCEDURE — G8482 FLU IMMUNIZE ORDER/ADMIN: HCPCS | Performed by: INTERNAL MEDICINE

## 2024-11-15 PROCEDURE — G8420 CALC BMI NORM PARAMETERS: HCPCS | Performed by: INTERNAL MEDICINE

## 2024-11-15 PROCEDURE — 1123F ACP DISCUSS/DSCN MKR DOCD: CPT | Performed by: INTERNAL MEDICINE

## 2024-11-15 PROCEDURE — 1090F PRES/ABSN URINE INCON ASSESS: CPT | Performed by: INTERNAL MEDICINE

## 2024-11-15 PROCEDURE — 3075F SYST BP GE 130 - 139MM HG: CPT | Performed by: INTERNAL MEDICINE

## 2024-11-15 PROCEDURE — G8427 DOCREV CUR MEDS BY ELIG CLIN: HCPCS | Performed by: INTERNAL MEDICINE

## 2024-11-15 PROCEDURE — 93000 ELECTROCARDIOGRAM COMPLETE: CPT | Performed by: INTERNAL MEDICINE

## 2024-11-15 PROCEDURE — 99214 OFFICE O/P EST MOD 30 MIN: CPT | Performed by: INTERNAL MEDICINE

## 2024-11-15 PROCEDURE — 3079F DIAST BP 80-89 MM HG: CPT | Performed by: INTERNAL MEDICINE

## 2024-11-15 PROCEDURE — 1036F TOBACCO NON-USER: CPT | Performed by: INTERNAL MEDICINE

## 2024-11-15 PROCEDURE — G8399 PT W/DXA RESULTS DOCUMENT: HCPCS | Performed by: INTERNAL MEDICINE

## 2024-11-15 RX ORDER — AMLODIPINE BESYLATE 10 MG/1
TABLET ORAL
Qty: 90 TABLET | Refills: 3 | Status: SHIPPED | OUTPATIENT
Start: 2024-11-15

## 2024-11-15 RX ORDER — METOPROLOL SUCCINATE 100 MG/1
100 TABLET, EXTENDED RELEASE ORAL DAILY
Qty: 90 TABLET | Refills: 3 | Status: SHIPPED | OUTPATIENT
Start: 2024-11-15

## 2024-11-15 NOTE — PROGRESS NOTES
Chief Complaint   Patient presents with    Atrial Fibrillation    Hypertension    1 Year Follow Up       Patient presents for initial medical evaluation. Patient is followed on a regular basis by Dr. Hammer, Sandra WOODARD, APRN - CNP. Would like to switch cardiologist. Has hx of Afibb since 2016. On sotalol 120mg BID and not on any NOAC's at this time. EKG yesterday shows afibb with CVR. Does not really feel that she is in afibb.  Pt denies chest pain, dyspnea, dyspnea on exertion, change in exercise capacity, fatigue,  nausea, vomiting, diarrhea, constipation, motor weakness, insomnia, weight loss, syncope, dizziness, lightheadedness,  PND, orthopnea, or claudication. No nitro use. BP and hr are good.No LE discoloration or ulcers. No LE edema. No CHF type symptoms. Lipid profile is normal. No recent hospitalization. No change in meds. No hx of CVN.     ECHO in 2016 EF of 50%, mod LAE, mild TR, PHTN 41mMHg.   Nuc stress test in 2015 was normal.        11-30-18: EKG with Afibb at 103bpm. On Eliquis now for 6 weeks. No nose bleeds so far.  Does not really feel that she is in afibb.  Pt denies chest pain, dyspnea, dyspnea on exertion, change in exercise capacity, fatigue,  nausea, vomiting, diarrhea, constipation, motor weakness, insomnia, weight loss, syncope, dizziness, lightheadedness,  PND, orthopnea, or claudication. No nitro use. BP and hr are good.No LE discoloration or ulcers. No LE edema. No CHF type symptoms. Lipid profile is normal. No recent hospitalization. No change in meds. No hx of CVN.       12-13-18: s/p successful CVN but EKG today shows afibb with CVR. She felt it yesterday. She has been on sotalol 120mg BID, normal QTC. Pt denies chest pain, dyspnea, dyspnea on exertion, change in exercise capacity, fatigue,  nausea, vomiting, diarrhea, constipation, motor weakness, insomnia, weight loss, syncope, dizziness, lightheadedness, palpitations, PND, orthopnea, or claudication. No nitro use. BP and hr are good.

## 2024-11-20 ENCOUNTER — TELEPHONE (OUTPATIENT)
Dept: CT IMAGING | Age: 84
End: 2024-11-20

## 2024-11-20 DIAGNOSIS — I10 ESSENTIAL HYPERTENSION: ICD-10-CM

## 2024-11-20 RX ORDER — METOPROLOL SUCCINATE 100 MG/1
100 TABLET, EXTENDED RELEASE ORAL DAILY
Qty: 14 TABLET | Refills: 0 | Status: SHIPPED | OUTPATIENT
Start: 2024-11-20 | End: 2024-11-20 | Stop reason: SDUPTHER

## 2024-11-20 NOTE — TELEPHONE ENCOUNTER
Pt stopped in she saw cardiolgy yesterday they ordered her Toprol but it was sent to mail away. She is out of this and her BP is high asking for a short script to be called into [facility]

## 2024-12-09 DIAGNOSIS — E03.9 HYPOTHYROIDISM, UNSPECIFIED TYPE: ICD-10-CM

## 2024-12-09 RX ORDER — LEVOTHYROXINE SODIUM 25 UG/1
25 TABLET ORAL DAILY
Qty: 90 TABLET | Refills: 3 | Status: SHIPPED | OUTPATIENT
Start: 2024-12-09

## 2024-12-09 NOTE — TELEPHONE ENCOUNTER
Comments:     Last Office Visit (last PCP visit):   10/10/2024    Next Visit Date:  Future Appointments   Date Time Provider Department Center   2/10/2025 10:00 AM Sadnra Hammer APRN - CNP [facility]   4/24/2025 11:30 AM Sandra Hammer APRN - CNP [facility]   11/14/2025 10:45 AM Holiday, DO [facility]       **If hasn't been seen in over a year OR hasn't followed up according to last diabetes/ADHD visit, make appointment for patient before sending refill to provider.    Rx requested:  Requested Prescriptions     Pending Prescriptions Disp Refills    levothyroxine (SYNTHROID) 25 MCG tablet [Pharmacy Med Name: Levothyroxine Sodium Oral Tablet 25 MCG] 90 tablet 3     Sig: TAKE 1 TABLET EVERY DAY

## 2025-02-10 ENCOUNTER — OFFICE VISIT (OUTPATIENT)
Dept: FAMILY MEDICINE CLINIC | Age: 85
End: 2025-02-10
Payer: MEDICARE

## 2025-02-10 VITALS
TEMPERATURE: 97.2 F | WEIGHT: 135.6 LBS | DIASTOLIC BLOOD PRESSURE: 80 MMHG | OXYGEN SATURATION: 97 % | HEART RATE: 94 BPM | HEIGHT: 62 IN | SYSTOLIC BLOOD PRESSURE: 136 MMHG | BODY MASS INDEX: 24.95 KG/M2

## 2025-02-10 DIAGNOSIS — I48.19 PERSISTENT ATRIAL FIBRILLATION (HCC): Primary | ICD-10-CM

## 2025-02-10 DIAGNOSIS — R09.82 POST-NASAL DRIP: ICD-10-CM

## 2025-02-10 DIAGNOSIS — I10 ESSENTIAL HYPERTENSION: ICD-10-CM

## 2025-02-10 DIAGNOSIS — E03.9 HYPOTHYROIDISM, UNSPECIFIED TYPE: ICD-10-CM

## 2025-02-10 DIAGNOSIS — F41.9 ANXIETY: ICD-10-CM

## 2025-02-10 DIAGNOSIS — L98.8 AGE-RELATED FACIAL WRINKLES: ICD-10-CM

## 2025-02-10 PROCEDURE — 1090F PRES/ABSN URINE INCON ASSESS: CPT | Performed by: NURSE PRACTITIONER

## 2025-02-10 PROCEDURE — G8427 DOCREV CUR MEDS BY ELIG CLIN: HCPCS | Performed by: NURSE PRACTITIONER

## 2025-02-10 PROCEDURE — G8420 CALC BMI NORM PARAMETERS: HCPCS | Performed by: NURSE PRACTITIONER

## 2025-02-10 PROCEDURE — 99214 OFFICE O/P EST MOD 30 MIN: CPT | Performed by: NURSE PRACTITIONER

## 2025-02-10 PROCEDURE — 1123F ACP DISCUSS/DSCN MKR DOCD: CPT | Performed by: NURSE PRACTITIONER

## 2025-02-10 PROCEDURE — G8399 PT W/DXA RESULTS DOCUMENT: HCPCS | Performed by: NURSE PRACTITIONER

## 2025-02-10 PROCEDURE — 3075F SYST BP GE 130 - 139MM HG: CPT | Performed by: NURSE PRACTITIONER

## 2025-02-10 PROCEDURE — 1036F TOBACCO NON-USER: CPT | Performed by: NURSE PRACTITIONER

## 2025-02-10 PROCEDURE — 3079F DIAST BP 80-89 MM HG: CPT | Performed by: NURSE PRACTITIONER

## 2025-02-10 RX ORDER — FLUTICASONE PROPIONATE 50 MCG
1 SPRAY, SUSPENSION (ML) NASAL DAILY
Qty: 16 G | Refills: 2 | Status: SHIPPED | OUTPATIENT
Start: 2025-02-10

## 2025-02-10 RX ORDER — TRETINOIN 1 MG/G
CREAM TOPICAL
Qty: 45 G | Refills: 5 | Status: SHIPPED | OUTPATIENT
Start: 2025-02-10

## 2025-02-10 SDOH — ECONOMIC STABILITY: FOOD INSECURITY: WITHIN THE PAST 12 MONTHS, THE FOOD YOU BOUGHT JUST DIDN'T LAST AND YOU DIDN'T HAVE MONEY TO GET MORE.: NEVER TRUE

## 2025-02-10 SDOH — ECONOMIC STABILITY: FOOD INSECURITY: WITHIN THE PAST 12 MONTHS, YOU WORRIED THAT YOUR FOOD WOULD RUN OUT BEFORE YOU GOT MONEY TO BUY MORE.: NEVER TRUE

## 2025-02-10 ASSESSMENT — ENCOUNTER SYMPTOMS
CONSTIPATION: 0
SHORTNESS OF BREATH: 0
COUGH: 0
DIARRHEA: 0

## 2025-02-10 ASSESSMENT — PATIENT HEALTH QUESTIONNAIRE - PHQ9
SUM OF ALL RESPONSES TO PHQ QUESTIONS 1-9: 0
SUM OF ALL RESPONSES TO PHQ QUESTIONS 1-9: 0
2. FEELING DOWN, DEPRESSED OR HOPELESS: NOT AT ALL
1. LITTLE INTEREST OR PLEASURE IN DOING THINGS: NOT AT ALL
SUM OF ALL RESPONSES TO PHQ QUESTIONS 1-9: 0
SUM OF ALL RESPONSES TO PHQ9 QUESTIONS 1 & 2: 0
SUM OF ALL RESPONSES TO PHQ QUESTIONS 1-9: 0

## 2025-02-10 NOTE — PROGRESS NOTES
Subjective  Chief Complaint   Patient presents with    4 Month Follow Up     Does have concerns    Hypertension     No concerns    Discuss Medications     Would like to discuss the B-12, Tretinoin for her face.    Oral Swelling     States that she is gagging and coughing up yellow thick mucous, sometimes choking on the mucous, x 2/3 months       HPI    History of Present Illness  The patient is an 85-year-old female who presents for postnasal drip, vitamin B12 supplementation, and medication refill.    She has been experiencing persistent postnasal drip for several months, which has shown some improvement but remains present. She reports a significant episode of coughing approximately 2 months ago, characterized by a barking sound and a sensation of throat congestion, particularly after meals. The congestion has since improved, but she continues to produce yellowish mucus. She also reports difficulty swallowing liquids when her head is tilted back, leading to choking incidents. She does not experience any associated sore throat or pain but reports a sensation of food settling in her throat. She does not experience any current choking episodes as she avoids tilting her head backward. She expresses concern about potential damage from long-term consumption of hot coffee.    She is currently on a daily regimen of vitamin B12 at a dosage of 1000 mcg. She has recently purchased a new formulation of vitamin B12, methylcobalamin, at a higher dosage of 5000 mcg, and seeks advice on its use.    She has been using tretinoin cream for approximately 35 years, initially starting with the non-generic version before transitioning to the generic form. She received a letter from [facility] indicating that they require her prescriber to submit an authorization request for the medication. Despite infrequent use, she finds the cream beneficial for her mental health. She recalls a previous prescription from this office for

## 2025-04-29 ENCOUNTER — RESULTS FOLLOW-UP (OUTPATIENT)
Dept: FAMILY MEDICINE CLINIC | Age: 85
End: 2025-04-29

## 2025-04-29 ENCOUNTER — OFFICE VISIT (OUTPATIENT)
Dept: FAMILY MEDICINE CLINIC | Age: 85
End: 2025-04-29
Payer: MEDICARE

## 2025-04-29 VITALS
WEIGHT: 134.8 LBS | HEART RATE: 87 BPM | OXYGEN SATURATION: 94 % | BODY MASS INDEX: 24.8 KG/M2 | TEMPERATURE: 97.7 F | HEIGHT: 62 IN | SYSTOLIC BLOOD PRESSURE: 136 MMHG | DIASTOLIC BLOOD PRESSURE: 84 MMHG

## 2025-04-29 DIAGNOSIS — E03.9 HYPOTHYROIDISM, UNSPECIFIED TYPE: ICD-10-CM

## 2025-04-29 DIAGNOSIS — R53.83 FATIGUE, UNSPECIFIED TYPE: ICD-10-CM

## 2025-04-29 DIAGNOSIS — I10 ESSENTIAL HYPERTENSION: ICD-10-CM

## 2025-04-29 DIAGNOSIS — Z00.00 MEDICARE ANNUAL WELLNESS VISIT, SUBSEQUENT: Primary | ICD-10-CM

## 2025-04-29 LAB
ALBUMIN SERPL-MCNC: 4.5 G/DL (ref 3.5–4.6)
ALP SERPL-CCNC: 70 U/L (ref 40–130)
ALT SERPL-CCNC: 24 U/L (ref 0–33)
ANION GAP SERPL CALCULATED.3IONS-SCNC: 12 MEQ/L (ref 9–15)
AST SERPL-CCNC: 25 U/L (ref 0–35)
BILIRUB SERPL-MCNC: 0.7 MG/DL (ref 0.2–0.7)
BUN SERPL-MCNC: 21 MG/DL (ref 8–23)
CALCIUM SERPL-MCNC: 9.6 MG/DL (ref 8.5–9.9)
CHLORIDE SERPL-SCNC: 100 MEQ/L (ref 95–107)
CO2 SERPL-SCNC: 26 MEQ/L (ref 20–31)
CREAT SERPL-MCNC: 0.78 MG/DL (ref 0.5–0.9)
ERYTHROCYTE [DISTWIDTH] IN BLOOD BY AUTOMATED COUNT: 12.8 % (ref 11.5–14.5)
GLOBULIN SER CALC-MCNC: 3 G/DL (ref 2.3–3.5)
GLUCOSE SERPL-MCNC: 99 MG/DL (ref 70–99)
HCT VFR BLD AUTO: 45.1 % (ref 37–47)
HGB BLD-MCNC: 15.3 G/DL (ref 12–16)
MCH RBC QN AUTO: 31.7 PG (ref 27–31.3)
MCHC RBC AUTO-ENTMCNC: 33.9 % (ref 33–37)
MCV RBC AUTO: 93.4 FL (ref 79.4–94.8)
PLATELET # BLD AUTO: 215 K/UL (ref 130–400)
POTASSIUM SERPL-SCNC: 4.5 MEQ/L (ref 3.4–4.9)
PROT SERPL-MCNC: 7.5 G/DL (ref 6.3–8)
RBC # BLD AUTO: 4.83 M/UL (ref 4.2–5.4)
SODIUM SERPL-SCNC: 138 MEQ/L (ref 135–144)
TSH REFLEX: 3.99 UIU/ML (ref 0.44–3.86)
WBC # BLD AUTO: 5.8 K/UL (ref 4.8–10.8)

## 2025-04-29 PROCEDURE — 3075F SYST BP GE 130 - 139MM HG: CPT | Performed by: NURSE PRACTITIONER

## 2025-04-29 PROCEDURE — G0439 PPPS, SUBSEQ VISIT: HCPCS | Performed by: NURSE PRACTITIONER

## 2025-04-29 PROCEDURE — 3079F DIAST BP 80-89 MM HG: CPT | Performed by: NURSE PRACTITIONER

## 2025-04-29 PROCEDURE — 1123F ACP DISCUSS/DSCN MKR DOCD: CPT | Performed by: NURSE PRACTITIONER

## 2025-04-29 ASSESSMENT — PATIENT HEALTH QUESTIONNAIRE - PHQ9
SUM OF ALL RESPONSES TO PHQ QUESTIONS 1-9: 0
SUM OF ALL RESPONSES TO PHQ QUESTIONS 1-9: 0
1. LITTLE INTEREST OR PLEASURE IN DOING THINGS: NOT AT ALL
2. FEELING DOWN, DEPRESSED OR HOPELESS: NOT AT ALL

## 2025-04-29 NOTE — PROGRESS NOTES
Behavior normal.         Thought Content: Thought content normal.         Judgment: Judgment normal.                     Allergies   Allergen Reactions    Dofetilide Other (See Comments)     VERTIGO    Dust Mite Extract     Molds & Smuts      Prior to Visit Medications    Medication Sig Taking? Authorizing Provider   tretinoin (RETIN-A) 0.1 % cream Apply topically nightly. Yes Sandra Hammer APRN - CNP   fluticasone (FLONASE) 50 MCG/ACT nasal spray 1 spray by Each Nostril route daily Yes Sandra Hammer APRN - CNP   levothyroxine (SYNTHROID) 25 MCG tablet TAKE 1 TABLET EVERY DAY Yes Sandra Hammer APRN - CNP   metoprolol succinate (TOPROL XL) 100 MG extended release tablet Take 1 tablet by mouth daily Yes Sandra Hammer APRN - CNP   amLODIPine (NORVASC) 10 MG tablet TAKE 1 TABLET EVERY DAY Yes HolRaj garcia, DO   apixaban (ELIQUIS) 2.5 MG TABS tablet Take 1 tablet by mouth 2 times daily Yes HolidayRaj, DO   sertraline (ZOLOFT) 100 MG tablet TAKE 1 TABLET EVERY DAY Yes Sandra Hammer APRN - CNP   Magnesium Oxide 250 MG TABS Take by mouth daily Yes ProviderVaughn MD   B COMPLEX-C ER PO Take by mouth daily Yes ProviderVaughn MD   vitamin D3 (CHOLECALCIFEROL) 400 units TABS tablet Take 1 tablet by mouth daily Yes ProviderVaughn MD   Ascorbic Acid (VITAMIN C) 1000 MG tablet Take 1 tablet by mouth daily Yes ProviderVaughn MD   Cyanocobalamin (B-12 PO) Take 1,000 mg by mouth daily Pt said she splits a 2500mg dissolving tab in half so approximately 1200mg  Daily.  Yes ProviderVaughn MD CareTeam (Including outside providers/suppliers regularly involved in providing care):   Patient Care Team:  Sandra Hammer APRN - CNP as PCP - General (Nurse Practitioner)  Sandra Hammer APRN - CNP as PCP - Empaneled Provider     Recommendations for Preventive Services Due: see orders and patient instructions/AVS.  Recommended screening schedule for the next 5-10 years is provided

## 2025-06-06 ENCOUNTER — OFFICE VISIT (OUTPATIENT)
Dept: FAMILY MEDICINE CLINIC | Age: 85
End: 2025-06-06

## 2025-06-06 VITALS
OXYGEN SATURATION: 96 % | SYSTOLIC BLOOD PRESSURE: 136 MMHG | BODY MASS INDEX: 25.25 KG/M2 | DIASTOLIC BLOOD PRESSURE: 86 MMHG | HEART RATE: 60 BPM | TEMPERATURE: 98 F | WEIGHT: 137.2 LBS | HEIGHT: 62 IN

## 2025-06-06 DIAGNOSIS — I48.19 PERSISTENT ATRIAL FIBRILLATION (HCC): ICD-10-CM

## 2025-06-06 DIAGNOSIS — R21 RASH OF NECK: ICD-10-CM

## 2025-06-06 DIAGNOSIS — L03.115 CELLULITIS OF RIGHT LOWER LEG: Primary | ICD-10-CM

## 2025-06-06 RX ORDER — CEPHALEXIN 500 MG/1
500 CAPSULE ORAL 4 TIMES DAILY
Qty: 28 CAPSULE | Refills: 0 | Status: SHIPPED | OUTPATIENT
Start: 2025-06-06 | End: 2025-06-13

## 2025-06-06 RX ORDER — MUPIROCIN 20 MG/G
OINTMENT TOPICAL
Qty: 30 G | Refills: 1 | Status: SHIPPED | OUTPATIENT
Start: 2025-06-06

## 2025-06-06 ASSESSMENT — ENCOUNTER SYMPTOMS
CONSTIPATION: 0
COLOR CHANGE: 1
ABDOMINAL PAIN: 0
NAUSEA: 0
VOMITING: 0
SHORTNESS OF BREATH: 0
CHEST TIGHTNESS: 0
DIARRHEA: 0

## 2025-06-06 NOTE — TELEPHONE ENCOUNTER
Comments:     Last Office Visit (last PCP visit):   4/29/2025    Next Visit Date:  Future Appointments   Date Time Provider Department Center   10/29/2025 11:15 AM Sandra Hammer APRN - CNP [facility]   11/14/2025 10:45 AM Raj Gore DO [facility]   4/30/2026 11:30 AM Sandra Hammer APRN - CNP [facility]       **If hasn't been seen in over a year OR hasn't followed up according to last diabetes/ADHD visit, make appointment for patient before sending refill to provider.    Rx requested:  Requested Prescriptions      No prescriptions requested or ordered in this encounter

## 2025-06-06 NOTE — PROGRESS NOTES
Date of Visit:  2025  Patient Name: Inés Pineda   Patient :  1940     CHIEF COMPLAINT:     Inés Pineda is a 85 y.o. female who presents today for an general visit to be evaluated for the following condition(s):  Chief Complaint   Patient presents with    Fall     Pt fell 2 week ago at home. He states was out in the garden when she fell into the bush. No pain. She does have some spots on her legs/ neck that would like looked at. RT leg swollen and red        HISTORY OF PRESENT ILLNESS     I reviewed staff HPI/chief complaint and do agree with above    Subjective:  - Patient presents with multiple skin wounds on the right lower leg, and possible insect bites/wounds left side of the neck, left side of the scalp, and right side of the scalp. The leg lesions are not healing properly after an injury two weeks ago. Patient reports accidentally removing a scab from one of the leg lesions before it was ready to come off.  - patient does report mild edema to the right lower leg also redness and warmth to the areas wounds  - Patient reports falling into a bush while throwing mulch, which initially started the wounds to the lower leg  - Patient denies pain to the leg area.  - Denies any noted itching, pain/discomfort to the lesions on the scalp and the left side of neck.  Although she has noticed there has been some drainage from a area of the rash on the left scalp.  - Patient reports being bitten by something on the neck and scalp while weed eating recently, although denies seeing any insects while weeding.  - Patient denies any fever/chills, headaches, body aches, shortness of breath/troubles breathing, chest pain/discomfort, abdominal pain, any nausea/vomiting/diarrhea with symptoms  - Denies the use of any over-the-counter medications or treatments at this time.                REVIEW OF SYSTEM      Review of Systems   Constitutional:  Negative for chills, diaphoresis, fatigue and fever.   Respiratory:

## 2025-06-26 ENCOUNTER — OFFICE VISIT (OUTPATIENT)
Dept: FAMILY MEDICINE CLINIC | Age: 85
End: 2025-06-26

## 2025-06-26 VITALS
WEIGHT: 135.2 LBS | SYSTOLIC BLOOD PRESSURE: 134 MMHG | HEART RATE: 85 BPM | DIASTOLIC BLOOD PRESSURE: 78 MMHG | HEIGHT: 62 IN | BODY MASS INDEX: 24.88 KG/M2 | TEMPERATURE: 97.2 F | OXYGEN SATURATION: 100 %

## 2025-06-26 DIAGNOSIS — I10 ESSENTIAL HYPERTENSION: ICD-10-CM

## 2025-06-26 DIAGNOSIS — I48.19 PERSISTENT ATRIAL FIBRILLATION (HCC): ICD-10-CM

## 2025-06-26 DIAGNOSIS — L01.00 IMPETIGO: Primary | ICD-10-CM

## 2025-06-26 DIAGNOSIS — E03.9 HYPOTHYROIDISM, UNSPECIFIED TYPE: ICD-10-CM

## 2025-06-26 RX ORDER — SULFAMETHOXAZOLE AND TRIMETHOPRIM 800; 160 MG/1; MG/1
1 TABLET ORAL 2 TIMES DAILY
Qty: 14 TABLET | Refills: 0 | Status: SHIPPED | OUTPATIENT
Start: 2025-06-26 | End: 2025-07-03

## 2025-06-26 ASSESSMENT — ENCOUNTER SYMPTOMS
SHORTNESS OF BREATH: 0
COUGH: 0
COLOR CHANGE: 1

## 2025-06-26 NOTE — PROGRESS NOTES
Subjective  Chief Complaint   Patient presents with    Lesion(s)     Lesion on the LT side of the back of the head near the hair line, secretes sticky substance, about the size of a 50 cent piece, red, dry, scaly, doesn't itch, x 3 weeks       HPI    History of Present Illness  The patient is an 85-year-old female who presents for evaluation of a bump on her head.    She reports a significant improvement in her condition following the application of an antibiotic cream and the administration of oral antibiotics. She attributes the initial swelling to poor circulation, a common issue in her age group. She was prescribed Keflex and Bactroban cream.    Approximately 3 weeks ago, she noticed a large, non-painful bump on her head that exuded a sticky substance. Despite attempts to reduce its size through squeezing, the bump remained unchanged. She reports not shampooing her hair regularly due to its dryness and lack of oiliness. She has not observed any white flakes on her scalp. The onset of these symptoms coincided with an incident where she was bitten by insects while cleaning a birdhouse. She also mentions a history of eczema.    Patient Active Problem List    Diagnosis Date Noted    Conductive hearing loss, bilateral 06/15/2021    Sensorineural hearing loss (SNHL) of both ears 06/15/2021    Ataxia 12/20/2018    A-fib (HCC) 12/17/2018    Inflamed seborrheic keratosis     Atopic rhinitis 04/08/2016    Calculus of gallbladder 04/08/2016    Calculus of kidney 04/08/2016    Benign essential hypertension 04/08/2016    Anxiety state 04/08/2016    Persistent atrial fibrillation (HCC) 01/04/2016    Abnormal finding on thyroid function test 07/13/2005    Postmenopausal status 05/27/2005     Past Medical History:   Diagnosis Date    Allergic rhinitis     Anxiety     Atrial fibrillation (HCC)     Chronic kidney disease     Epistaxis 3/14/2017    Headache     Hypertension     Hypothyroidism     Inflamed seborrheic keratosis

## 2025-07-01 ENCOUNTER — APPOINTMENT (OUTPATIENT)
Dept: GENERAL RADIOLOGY | Age: 85
DRG: 189 | End: 2025-07-01
Payer: MEDICARE

## 2025-07-01 ENCOUNTER — HOSPITAL ENCOUNTER (INPATIENT)
Age: 85
LOS: 2 days | Discharge: HOME | DRG: 189 | End: 2025-07-03
Attending: STUDENT IN AN ORGANIZED HEALTH CARE EDUCATION/TRAINING PROGRAM | Admitting: INTERNAL MEDICINE
Payer: MEDICARE

## 2025-07-01 ENCOUNTER — APPOINTMENT (OUTPATIENT)
Dept: CT IMAGING | Age: 85
DRG: 189 | End: 2025-07-01
Payer: MEDICARE

## 2025-07-01 ENCOUNTER — APPOINTMENT (OUTPATIENT)
Age: 85
DRG: 189 | End: 2025-07-01
Payer: MEDICARE

## 2025-07-01 DIAGNOSIS — I50.9 NEW ONSET OF CONGESTIVE HEART FAILURE (HCC): ICD-10-CM

## 2025-07-01 DIAGNOSIS — I50.9 CONGESTIVE HEART FAILURE, UNSPECIFIED HF CHRONICITY, UNSPECIFIED HEART FAILURE TYPE (HCC): ICD-10-CM

## 2025-07-01 DIAGNOSIS — R06.02 SHORTNESS OF BREATH: ICD-10-CM

## 2025-07-01 DIAGNOSIS — E87.1 HYPONATREMIA: ICD-10-CM

## 2025-07-01 DIAGNOSIS — I48.91 ATRIAL FIBRILLATION WITH RVR (HCC): Primary | ICD-10-CM

## 2025-07-01 LAB
ALBUMIN SERPL-MCNC: 4.1 G/DL (ref 3.5–4.6)
ALP SERPL-CCNC: 70 U/L (ref 40–130)
ALT SERPL-CCNC: 48 U/L (ref 0–33)
ANION GAP SERPL CALCULATED.3IONS-SCNC: 14 MEQ/L (ref 9–15)
ANISOCYTOSIS BLD QL SMEAR: ABNORMAL
AST SERPL-CCNC: 72 U/L (ref 0–35)
B PARAP IS1001 DNA NPH QL NAA+NON-PROBE: NOT DETECTED
B PERT.PT PRMT NPH QL NAA+NON-PROBE: NOT DETECTED
BACTERIA URNS QL MICRO: NEGATIVE /HPF
BASE EXCESS ARTERIAL: -5 (ref -3–3)
BASOPHILS # BLD: 0 K/UL (ref 0–0.2)
BASOPHILS NFR BLD: 0.3 %
BILIRUB SERPL-MCNC: 0.6 MG/DL (ref 0.2–0.7)
BILIRUB UR QL STRIP: NEGATIVE
BNP BLD-MCNC: 2905 PG/ML
BUN SERPL-MCNC: 11 MG/DL (ref 8–23)
BURR CELLS: ABNORMAL
C PNEUM DNA NPH QL NAA+NON-PROBE: NOT DETECTED
CALCIUM IONIZED: 1.12 MMOL/L (ref 1.12–1.32)
CALCIUM SERPL-MCNC: 8.5 MG/DL (ref 8.5–9.9)
CHLORIDE SERPL-SCNC: 94 MEQ/L (ref 95–107)
CLARITY UR: CLEAR
CO2 SERPL-SCNC: 20 MEQ/L (ref 20–31)
COLOR UR: YELLOW
CREAT SERPL-MCNC: 0.76 MG/DL (ref 0.5–0.9)
ECHO AO ROOT DIAM: 2.5 CM
ECHO AO ROOT INDEX: 1.53 CM/M2
ECHO AV AREA PEAK VELOCITY: 1.9 CM2
ECHO AV AREA VTI: 1.8 CM2
ECHO AV AREA/BSA PEAK VELOCITY: 1.2 CM2/M2
ECHO AV AREA/BSA VTI: 1.1 CM2/M2
ECHO AV MEAN GRADIENT: 6 MMHG
ECHO AV MEAN VELOCITY: 1.2 M/S
ECHO AV PEAK GRADIENT: 11 MMHG
ECHO AV PEAK VELOCITY: 1.8 M/S
ECHO AV VELOCITY RATIO: 0.78
ECHO AV VTI: 28.6 CM
ECHO BSA: 1.65 M2
ECHO EST RA PRESSURE: 3 MMHG
ECHO LA DIAMETER INDEX: 2.27 CM/M2
ECHO LA DIAMETER: 3.7 CM
ECHO LA TO AORTIC ROOT RATIO: 1.48
ECHO LA VOL A-L A2C: 65 ML (ref 22–52)
ECHO LA VOL A-L A4C: 26 ML (ref 22–52)
ECHO LA VOL MOD A2C: 63 ML (ref 22–52)
ECHO LA VOL MOD A4C: 25 ML (ref 22–52)
ECHO LA VOLUME AREA LENGTH: 42 ML
ECHO LA VOLUME INDEX A-L A2C: 40 ML/M2 (ref 16–34)
ECHO LA VOLUME INDEX A-L A4C: 16 ML/M2 (ref 16–34)
ECHO LA VOLUME INDEX AREA LENGTH: 26 ML/M2 (ref 16–34)
ECHO LA VOLUME INDEX MOD A2C: 39 ML/M2 (ref 16–34)
ECHO LA VOLUME INDEX MOD A4C: 15 ML/M2 (ref 16–34)
ECHO LV E' LATERAL VELOCITY: 12.63 CM/S
ECHO LV E' SEPTAL VELOCITY: 9.78 CM/S
ECHO LV EDV A2C: 33 ML
ECHO LV EDV A4C: 40 ML
ECHO LV EDV BP: 38 ML (ref 56–104)
ECHO LV EDV INDEX A4C: 25 ML/M2
ECHO LV EDV INDEX BP: 23 ML/M2
ECHO LV EDV NDEX A2C: 20 ML/M2
ECHO LV EF PHYSICIAN: 55 %
ECHO LV EJECTION FRACTION A2C: 57 %
ECHO LV EJECTION FRACTION A4C: 56 %
ECHO LV EJECTION FRACTION BIPLANE: 58 % (ref 55–100)
ECHO LV ESV A2C: 14 ML
ECHO LV ESV A4C: 18 ML
ECHO LV ESV BP: 16 ML (ref 19–49)
ECHO LV ESV INDEX A2C: 9 ML/M2
ECHO LV ESV INDEX A4C: 11 ML/M2
ECHO LV ESV INDEX BP: 10 ML/M2
ECHO LV FRACTIONAL SHORTENING: 31 % (ref 28–44)
ECHO LV INTERNAL DIMENSION DIASTOLE INDEX: 2.15 CM/M2
ECHO LV INTERNAL DIMENSION DIASTOLIC: 3.5 CM (ref 3.9–5.3)
ECHO LV INTERNAL DIMENSION SYSTOLIC INDEX: 1.47 CM/M2
ECHO LV INTERNAL DIMENSION SYSTOLIC: 2.4 CM
ECHO LV IVSD: 0.9 CM (ref 0.6–0.9)
ECHO LV IVSS: 1.1 CM
ECHO LV MASS 2D: 135.8 G (ref 67–162)
ECHO LV MASS INDEX 2D: 83.3 G/M2 (ref 43–95)
ECHO LV POSTERIOR WALL DIASTOLIC: 1.5 CM (ref 0.6–0.9)
ECHO LV POSTERIOR WALL SYSTOLIC: 1.8 CM
ECHO LV RELATIVE WALL THICKNESS RATIO: 0.86
ECHO LVOT AREA: 2.5 CM2
ECHO LVOT AV VTI INDEX: 0.74
ECHO LVOT DIAM: 1.8 CM
ECHO LVOT MEAN GRADIENT: 3 MMHG
ECHO LVOT PEAK GRADIENT: 4 MMHG
ECHO LVOT PEAK VELOCITY: 1.4 M/S
ECHO LVOT STROKE VOLUME INDEX: 33.1 ML/M2
ECHO LVOT SV: 53.9 ML
ECHO LVOT VTI: 21.2 CM
ECHO MV E VELOCITY: 1.2 M/S
ECHO MV E/E' LATERAL: 9.5
ECHO MV E/E' RATIO (AVERAGED): 10.89
ECHO MV E/E' SEPTAL: 12.27
ECHO MV REGURGITANT PEAK GRADIENT: 44 MMHG
ECHO MV REGURGITANT PEAK VELOCITY: 3.3 M/S
ECHO PULMONARY ARTERY END DIASTOLIC PRESSURE: 6 MMHG
ECHO PV MAX VELOCITY: 1.3 M/S
ECHO PV PEAK GRADIENT: 7 MMHG
ECHO PV REGURGITANT MAX VELOCITY: 1.3 M/S
ECHO RIGHT VENTRICULAR SYSTOLIC PRESSURE (RVSP): 30 MMHG
ECHO RV INTERNAL DIMENSION: 2.6 CM
ECHO RV TAPSE: 1.6 CM (ref 1.7–?)
ECHO TV REGURGITANT MAX VELOCITY: 2.6 M/S
ECHO TV REGURGITANT PEAK GRADIENT: 27 MMHG
EKG ATRIAL RATE: 102 BPM
EKG DIAGNOSIS: NORMAL
EKG Q-T INTERVAL: 310 MS
EKG QRS DURATION: 82 MS
EKG QTC CALCULATION (BAZETT): 438 MS
EKG R AXIS: 17 DEGREES
EKG T AXIS: 14 DEGREES
EKG VENTRICULAR RATE: 120 BPM
EOSINOPHIL # BLD: 0.2 K/UL (ref 0–0.7)
EOSINOPHIL NFR BLD: 8 %
EPI CELLS #/AREA URNS HPF: NORMAL /HPF
ERYTHROCYTE [DISTWIDTH] IN BLOOD BY AUTOMATED COUNT: 13.2 % (ref 11.5–14.5)
FLUAV RNA NPH QL NAA+NON-PROBE: NOT DETECTED
FLUBV RNA NPH QL NAA+NON-PROBE: NOT DETECTED
GLOBULIN SER CALC-MCNC: 2.9 G/DL (ref 2.3–3.5)
GLUCOSE BLD-MCNC: 145 MG/DL (ref 70–99)
GLUCOSE SERPL-MCNC: 129 MG/DL (ref 70–99)
GLUCOSE UR STRIP-MCNC: NEGATIVE MG/DL
HADV DNA NPH QL NAA+NON-PROBE: NOT DETECTED
HCO3 ARTERIAL: 18.4 MMOL/L (ref 21–29)
HCOV 229E RNA NPH QL NAA+NON-PROBE: NOT DETECTED
HCOV HKU1 RNA NPH QL NAA+NON-PROBE: NOT DETECTED
HCOV NL63 RNA NPH QL NAA+NON-PROBE: NOT DETECTED
HCOV OC43 RNA NPH QL NAA+NON-PROBE: NOT DETECTED
HCT VFR BLD AUTO: 39 % (ref 36–48)
HCT VFR BLD AUTO: 42.4 % (ref 37–47)
HGB BLD CALC-MCNC: 13.3 GM/DL (ref 12–16)
HGB BLD-MCNC: 14.3 G/DL (ref 12–16)
HGB UR QL STRIP: ABNORMAL
HMPV RNA NPH QL NAA+NON-PROBE: NOT DETECTED
HPIV1 RNA NPH QL NAA+NON-PROBE: NOT DETECTED
HPIV2 RNA NPH QL NAA+NON-PROBE: NOT DETECTED
HPIV3 RNA NPH QL NAA+NON-PROBE: NOT DETECTED
HPIV4 RNA NPH QL NAA+NON-PROBE: NOT DETECTED
HYALINE CASTS #/AREA URNS LPF: NORMAL /LPF (ref 0–5)
KETONES UR STRIP-MCNC: ABNORMAL MG/DL
LACTATE: 1.03 MMOL/L (ref 0.4–2)
LACTIC ACID, SEPSIS: 1.7 MMOL/L (ref 0.5–1.9)
LEUKOCYTE ESTERASE UR QL STRIP: NEGATIVE
LYMPHOCYTES # BLD: 0.1 K/UL (ref 1–4.8)
LYMPHOCYTES NFR BLD: 3 %
M PNEUMO DNA NPH QL NAA+NON-PROBE: NOT DETECTED
MACROCYTES BLD QL SMEAR: ABNORMAL
MAGNESIUM SERPL-MCNC: 1.8 MG/DL (ref 1.7–2.4)
MAGNESIUM SERPL-MCNC: 2.3 MG/DL (ref 1.7–2.4)
MCH RBC QN AUTO: 31.5 PG (ref 27–31.3)
MCHC RBC AUTO-ENTMCNC: 33.7 % (ref 33–37)
MCV RBC AUTO: 93.4 FL (ref 79.4–94.8)
MONOCYTES # BLD: 0.2 K/UL (ref 0.2–0.8)
MONOCYTES NFR BLD: 4.8 %
MYELOCYTES NFR BLD MANUAL: 1 %
NEUTROPHILS # BLD: 2.6 K/UL (ref 1.4–6.5)
NEUTS BAND NFR BLD MANUAL: 13 % (ref 5–11)
NEUTS SEG NFR BLD: 70 %
NITRITE UR QL STRIP: NEGATIVE
O2 SAT, ARTERIAL: 96 % (ref 93–100)
OVALOCYTES BLD QL SMEAR: ABNORMAL
PCO2 ARTERIAL: 26 MM HG (ref 35–45)
PERFORMED ON: ABNORMAL
PH ARTERIAL: 7.46 (ref 7.35–7.45)
PH UR STRIP: 5.5 [PH] (ref 5–9)
PLATELET # BLD AUTO: 108 K/UL (ref 130–400)
PLATELET BLD QL SMEAR: ABNORMAL
PO2 ARTERIAL: 72 MM HG (ref 75–108)
POC CHLORIDE: 97 MEQ/L (ref 99–110)
POC CREATININE: 0.7 MG/DL (ref 0.6–1.2)
POC FIO2: 5
POC SAMPLE TYPE: ABNORMAL
POIKILOCYTOSIS BLD QL SMEAR: ABNORMAL
POTASSIUM SERPL-SCNC: 3.5 MEQ/L (ref 3.5–5.1)
POTASSIUM SERPL-SCNC: 3.9 MEQ/L (ref 3.4–4.9)
PROCALCITONIN SERPL IA-MCNC: 0.17 NG/ML (ref 0–0.15)
PROT SERPL-MCNC: 7 G/DL (ref 6.3–8)
PROT UR STRIP-MCNC: ABNORMAL MG/DL
RBC # BLD AUTO: 4.54 M/UL (ref 4.2–5.4)
RBC #/AREA URNS HPF: NORMAL /HPF (ref 0–2)
RSV RNA NPH QL NAA+NON-PROBE: NOT DETECTED
RV+EV RNA NPH QL NAA+NON-PROBE: NOT DETECTED
SARS-COV-2 RNA NPH QL NAA+NON-PROBE: NOT DETECTED
SLIDE REVIEW: ABNORMAL
SMUDGE CELLS BLD QL SMEAR: 1
SODIUM BLD-SCNC: 130 MEQ/L (ref 136–145)
SODIUM SERPL-SCNC: 128 MEQ/L (ref 135–144)
SP GR UR STRIP: 1.01 (ref 1–1.03)
TCO2 ARTERIAL: 19 MMOL/L (ref 21–32)
TROPONIN, HIGH SENSITIVITY: 17 NG/L (ref 0–19)
TROPONIN, HIGH SENSITIVITY: 17 NG/L (ref 0–19)
TSH REFLEX: 1.62 UIU/ML (ref 0.44–3.86)
URINE REFLEX TO CULTURE: ABNORMAL
UROBILINOGEN UR STRIP-ACNC: 1 E.U./DL
VARIANT LYMPHS NFR BLD: 1 %
WBC # BLD AUTO: 3.1 K/UL (ref 4.8–10.8)
WBC #/AREA URNS HPF: NORMAL /HPF (ref 0–5)

## 2025-07-01 PROCEDURE — 6370000000 HC RX 637 (ALT 250 FOR IP)

## 2025-07-01 PROCEDURE — 2060000000 HC ICU INTERMEDIATE R&B

## 2025-07-01 PROCEDURE — 71275 CT ANGIOGRAPHY CHEST: CPT

## 2025-07-01 PROCEDURE — 84145 PROCALCITONIN (PCT): CPT

## 2025-07-01 PROCEDURE — 6360000002 HC RX W HCPCS

## 2025-07-01 PROCEDURE — 6360000004 HC RX CONTRAST MEDICATION: Performed by: PERSONAL EMERGENCY RESPONSE ATTENDANT

## 2025-07-01 PROCEDURE — 96365 THER/PROPH/DIAG IV INF INIT: CPT

## 2025-07-01 PROCEDURE — 84295 ASSAY OF SERUM SODIUM: CPT

## 2025-07-01 PROCEDURE — 93306 TTE W/DOPPLER COMPLETE: CPT | Performed by: INTERNAL MEDICINE

## 2025-07-01 PROCEDURE — 36415 COLL VENOUS BLD VENIPUNCTURE: CPT

## 2025-07-01 PROCEDURE — 99285 EMERGENCY DEPT VISIT HI MDM: CPT

## 2025-07-01 PROCEDURE — 80053 COMPREHEN METABOLIC PANEL: CPT

## 2025-07-01 PROCEDURE — 83605 ASSAY OF LACTIC ACID: CPT

## 2025-07-01 PROCEDURE — 84443 ASSAY THYROID STIM HORMONE: CPT

## 2025-07-01 PROCEDURE — 85014 HEMATOCRIT: CPT

## 2025-07-01 PROCEDURE — 36600 WITHDRAWAL OF ARTERIAL BLOOD: CPT

## 2025-07-01 PROCEDURE — 83735 ASSAY OF MAGNESIUM: CPT

## 2025-07-01 PROCEDURE — 82330 ASSAY OF CALCIUM: CPT

## 2025-07-01 PROCEDURE — 6370000000 HC RX 637 (ALT 250 FOR IP): Performed by: INTERNAL MEDICINE

## 2025-07-01 PROCEDURE — 82565 ASSAY OF CREATININE: CPT

## 2025-07-01 PROCEDURE — 82435 ASSAY OF BLOOD CHLORIDE: CPT

## 2025-07-01 PROCEDURE — 85025 COMPLETE CBC W/AUTO DIFF WBC: CPT

## 2025-07-01 PROCEDURE — 71045 X-RAY EXAM CHEST 1 VIEW: CPT

## 2025-07-01 PROCEDURE — 84484 ASSAY OF TROPONIN QUANT: CPT

## 2025-07-01 PROCEDURE — 6360000002 HC RX W HCPCS: Performed by: PERSONAL EMERGENCY RESPONSE ATTENDANT

## 2025-07-01 PROCEDURE — 87040 BLOOD CULTURE FOR BACTERIA: CPT

## 2025-07-01 PROCEDURE — 96375 TX/PRO/DX INJ NEW DRUG ADDON: CPT

## 2025-07-01 PROCEDURE — 2500000003 HC RX 250 WO HCPCS

## 2025-07-01 PROCEDURE — 82803 BLOOD GASES ANY COMBINATION: CPT

## 2025-07-01 PROCEDURE — 83880 ASSAY OF NATRIURETIC PEPTIDE: CPT

## 2025-07-01 PROCEDURE — 99223 1ST HOSP IP/OBS HIGH 75: CPT | Performed by: INTERNAL MEDICINE

## 2025-07-01 PROCEDURE — 0202U NFCT DS 22 TRGT SARS-COV-2: CPT

## 2025-07-01 PROCEDURE — 81001 URINALYSIS AUTO W/SCOPE: CPT

## 2025-07-01 PROCEDURE — 2500000003 HC RX 250 WO HCPCS: Performed by: PERSONAL EMERGENCY RESPONSE ATTENDANT

## 2025-07-01 PROCEDURE — APPSS45 APP SPLIT SHARED TIME 31-45 MINUTES

## 2025-07-01 PROCEDURE — 84132 ASSAY OF SERUM POTASSIUM: CPT

## 2025-07-01 PROCEDURE — 93306 TTE W/DOPPLER COMPLETE: CPT

## 2025-07-01 PROCEDURE — 93005 ELECTROCARDIOGRAM TRACING: CPT | Performed by: PERSONAL EMERGENCY RESPONSE ATTENDANT

## 2025-07-01 RX ORDER — FUROSEMIDE 10 MG/ML
20 INJECTION INTRAMUSCULAR; INTRAVENOUS 2 TIMES DAILY
Status: DISCONTINUED | OUTPATIENT
Start: 2025-07-01 | End: 2025-07-02

## 2025-07-01 RX ORDER — ACETAMINOPHEN 650 MG/1
650 SUPPOSITORY RECTAL EVERY 6 HOURS PRN
Status: DISCONTINUED | OUTPATIENT
Start: 2025-07-01 | End: 2025-07-03 | Stop reason: HOSPADM

## 2025-07-01 RX ORDER — POLYETHYLENE GLYCOL 3350 17 G/17G
17 POWDER, FOR SOLUTION ORAL DAILY PRN
Status: DISCONTINUED | OUTPATIENT
Start: 2025-07-01 | End: 2025-07-03 | Stop reason: HOSPADM

## 2025-07-01 RX ORDER — ONDANSETRON 4 MG/1
4 TABLET, ORALLY DISINTEGRATING ORAL EVERY 8 HOURS PRN
Status: DISCONTINUED | OUTPATIENT
Start: 2025-07-01 | End: 2025-07-03 | Stop reason: HOSPADM

## 2025-07-01 RX ORDER — DILTIAZEM HYDROCHLORIDE 5 MG/ML
10 INJECTION INTRAVENOUS ONCE
Status: COMPLETED | OUTPATIENT
Start: 2025-07-01 | End: 2025-07-01

## 2025-07-01 RX ORDER — MULTIVITAMIN WITH IRON
1000 TABLET ORAL DAILY
Status: DISCONTINUED | OUTPATIENT
Start: 2025-07-01 | End: 2025-07-03 | Stop reason: HOSPADM

## 2025-07-01 RX ORDER — MAGNESIUM SULFATE IN WATER 40 MG/ML
2000 INJECTION, SOLUTION INTRAVENOUS ONCE
Status: COMPLETED | OUTPATIENT
Start: 2025-07-01 | End: 2025-07-01

## 2025-07-01 RX ORDER — IOPAMIDOL 755 MG/ML
75 INJECTION, SOLUTION INTRAVASCULAR
Status: COMPLETED | OUTPATIENT
Start: 2025-07-01 | End: 2025-07-01

## 2025-07-01 RX ORDER — SODIUM CHLORIDE 0.9 % (FLUSH) 0.9 %
5-40 SYRINGE (ML) INJECTION EVERY 12 HOURS SCHEDULED
Status: DISCONTINUED | OUTPATIENT
Start: 2025-07-01 | End: 2025-07-03 | Stop reason: HOSPADM

## 2025-07-01 RX ORDER — FUROSEMIDE 10 MG/ML
60 INJECTION INTRAMUSCULAR; INTRAVENOUS ONCE
Status: DISCONTINUED | OUTPATIENT
Start: 2025-07-01 | End: 2025-07-01

## 2025-07-01 RX ORDER — POTASSIUM CHLORIDE 7.45 MG/ML
10 INJECTION INTRAVENOUS PRN
Status: DISCONTINUED | OUTPATIENT
Start: 2025-07-01 | End: 2025-07-03 | Stop reason: HOSPADM

## 2025-07-01 RX ORDER — FUROSEMIDE 10 MG/ML
20 INJECTION INTRAMUSCULAR; INTRAVENOUS DAILY
Status: DISCONTINUED | OUTPATIENT
Start: 2025-07-01 | End: 2025-07-01

## 2025-07-01 RX ORDER — ASCORBIC ACID 500 MG
1000 TABLET ORAL DAILY
Status: DISCONTINUED | OUTPATIENT
Start: 2025-07-01 | End: 2025-07-03 | Stop reason: HOSPADM

## 2025-07-01 RX ORDER — FUROSEMIDE 10 MG/ML
40 INJECTION INTRAMUSCULAR; INTRAVENOUS ONCE
Status: COMPLETED | OUTPATIENT
Start: 2025-07-01 | End: 2025-07-01

## 2025-07-01 RX ORDER — IPRATROPIUM BROMIDE AND ALBUTEROL SULFATE 2.5; .5 MG/3ML; MG/3ML
2 SOLUTION RESPIRATORY (INHALATION) CONTINUOUS PRN
Status: DISCONTINUED | OUTPATIENT
Start: 2025-07-01 | End: 2025-07-01

## 2025-07-01 RX ORDER — ACETAMINOPHEN 325 MG/1
650 TABLET ORAL EVERY 6 HOURS PRN
Status: DISCONTINUED | OUTPATIENT
Start: 2025-07-01 | End: 2025-07-03 | Stop reason: HOSPADM

## 2025-07-01 RX ORDER — SERTRALINE HYDROCHLORIDE 100 MG/1
100 TABLET, FILM COATED ORAL DAILY
Status: DISCONTINUED | OUTPATIENT
Start: 2025-07-01 | End: 2025-07-03 | Stop reason: HOSPADM

## 2025-07-01 RX ORDER — SODIUM CHLORIDE 0.9 % (FLUSH) 0.9 %
5-40 SYRINGE (ML) INJECTION PRN
Status: DISCONTINUED | OUTPATIENT
Start: 2025-07-01 | End: 2025-07-03 | Stop reason: HOSPADM

## 2025-07-01 RX ORDER — MAGNESIUM SULFATE IN WATER 40 MG/ML
2000 INJECTION, SOLUTION INTRAVENOUS PRN
Status: DISCONTINUED | OUTPATIENT
Start: 2025-07-01 | End: 2025-07-03 | Stop reason: HOSPADM

## 2025-07-01 RX ORDER — SODIUM CHLORIDE 9 MG/ML
INJECTION, SOLUTION INTRAVENOUS PRN
Status: DISCONTINUED | OUTPATIENT
Start: 2025-07-01 | End: 2025-07-03 | Stop reason: HOSPADM

## 2025-07-01 RX ORDER — LEVOTHYROXINE SODIUM 25 UG/1
25 TABLET ORAL DAILY
Status: DISCONTINUED | OUTPATIENT
Start: 2025-07-01 | End: 2025-07-03 | Stop reason: HOSPADM

## 2025-07-01 RX ORDER — POTASSIUM CHLORIDE 1500 MG/1
40 TABLET, EXTENDED RELEASE ORAL PRN
Status: DISCONTINUED | OUTPATIENT
Start: 2025-07-01 | End: 2025-07-03 | Stop reason: HOSPADM

## 2025-07-01 RX ORDER — ONDANSETRON 2 MG/ML
4 INJECTION INTRAMUSCULAR; INTRAVENOUS EVERY 6 HOURS PRN
Status: DISCONTINUED | OUTPATIENT
Start: 2025-07-01 | End: 2025-07-03 | Stop reason: HOSPADM

## 2025-07-01 RX ADMIN — SODIUM CHLORIDE, PRESERVATIVE FREE 10 ML: 5 INJECTION INTRAVENOUS at 21:00

## 2025-07-01 RX ADMIN — IOPAMIDOL 75 ML: 755 INJECTION, SOLUTION INTRAVENOUS at 04:59

## 2025-07-01 RX ADMIN — CHOLECALCIFEROL (VITAMIN D3) 10 MCG (400 UNIT) TABLET 400 UNITS: at 16:51

## 2025-07-01 RX ADMIN — APIXABAN 5 MG: 5 TABLET, FILM COATED ORAL at 10:32

## 2025-07-01 RX ADMIN — OXYCODONE HYDROCHLORIDE AND ACETAMINOPHEN 1000 MG: 500 TABLET ORAL at 14:50

## 2025-07-01 RX ADMIN — MAGNESIUM SULFATE HEPTAHYDRATE 2000 MG: 40 INJECTION, SOLUTION INTRAVENOUS at 02:57

## 2025-07-01 RX ADMIN — CYANOCOBALAMIN TAB 500 MCG 1000 MCG: 500 TAB at 14:51

## 2025-07-01 RX ADMIN — FUROSEMIDE 20 MG: 10 INJECTION, SOLUTION INTRAMUSCULAR; INTRAVENOUS at 10:32

## 2025-07-01 RX ADMIN — LEVOTHYROXINE SODIUM 25 MCG: 0.03 TABLET ORAL at 14:50

## 2025-07-01 RX ADMIN — METOPROLOL SUCCINATE 150 MG: 50 TABLET, EXTENDED RELEASE ORAL at 16:58

## 2025-07-01 RX ADMIN — FUROSEMIDE 20 MG: 10 INJECTION, SOLUTION INTRAMUSCULAR; INTRAVENOUS at 16:51

## 2025-07-01 RX ADMIN — FUROSEMIDE 40 MG: 10 INJECTION, SOLUTION INTRAMUSCULAR; INTRAVENOUS at 03:36

## 2025-07-01 RX ADMIN — DILTIAZEM HYDROCHLORIDE 10 MG: 5 INJECTION, SOLUTION INTRAVENOUS at 02:57

## 2025-07-01 RX ADMIN — SERTRALINE 100 MG: 100 TABLET, FILM COATED ORAL at 14:50

## 2025-07-01 RX ADMIN — METHYLPREDNISOLONE SODIUM SUCCINATE 125 MG: 125 INJECTION INTRAMUSCULAR; INTRAVENOUS at 02:58

## 2025-07-01 RX ADMIN — APIXABAN 5 MG: 5 TABLET, FILM COATED ORAL at 20:11

## 2025-07-01 RX ADMIN — Medication 3 MG: at 20:11

## 2025-07-01 ASSESSMENT — ENCOUNTER SYMPTOMS
ABDOMINAL PAIN: 0
RHINORRHEA: 1
WHEEZING: 0
SHORTNESS OF BREATH: 1
COUGH: 0
CHEST TIGHTNESS: 0
BLOOD IN STOOL: 0
DIARRHEA: 1
VOMITING: 0
SORE THROAT: 0
WHEEZING: 1
NAUSEA: 0
COLOR CHANGE: 0

## 2025-07-01 ASSESSMENT — PAIN SCALES - GENERAL
PAINLEVEL_OUTOF10: 0
PAINLEVEL_OUTOF10: 0

## 2025-07-01 ASSESSMENT — PAIN - FUNCTIONAL ASSESSMENT: PAIN_FUNCTIONAL_ASSESSMENT: NONE - DENIES PAIN

## 2025-07-01 NOTE — H&P
Hospitalist Group   History and Physical    Attending: Dr. Lantigua    CHIEF COMPLAINT: Fatigue    History of Present Illness:  Inés Pineda is a 85 y.o. female with a PMHx of A-fib on Eliquis, CKD, hypertension, hypothyroidism, and anxiety per the patient and EMR review, presents with a chief complaint of fatigue and shortness of breath.  Patient reports that for the past 3 days, she has had increased fatigue and shortness of breath.  She states that today, she was so tired she could not even get out of bed.  She endorses rhinorrhea and some congestion for the past few days that seems to be getting better.  She does have a history of A-fib, but states she has only been taking her BID Eliquis once daily for the past year because she had a nosebleed from it at 1 point and she was scared.  On ED arrival, she was in A-fib RVR with a heart rate in the 120s-130s, she denies feeling heart palpitations or chest pain.  She endorses a subjective fever yesterday with an episode of diarrhea.  On ED arrival, she was 86% on room air.  She does not wear oxygen at home.  She cannot endorse or deny orthopnea due to sleeping in a adjustable bed and always sleeping in an elevated angle.  States she does have MOORE.    REVIEW OF SYSTEMS:  No fevers, chills, cp, sob, n/v, ha, vision/hearing changes, wt changes, hot/cold flashes, other open skin lesions, diarrhea, constipation, dysuria/hematuria unless noted in HPI. Complete ROS performed with the patient and is otherwise negative.    PMH:  Past Medical History:   Diagnosis Date    Allergic rhinitis     Anxiety     Atrial fibrillation (HCC)     Chronic kidney disease     Epistaxis 3/14/2017    Headache     Hypertension     Hypothyroidism     Inflamed seborrheic keratosis        Surgical History:  Past Surgical History:   Procedure Laterality Date    HYSTERECTOMY (CERVIX STATUS UNKNOWN)         Medications Prior to Admission:    Prior to Admission medications    Medication Sig Start Date

## 2025-07-01 NOTE — ED PROVIDER NOTES
[facility] EMERGENCY DEPARTMENT  eMERGENCY dEPARTMENT eNCOUnter      Pt Name: Inés Pineda  MRN: [id number]  Birthdate 1940  Date of evaluation: 7/1/2025  Provider: ANDREW BANKS  2:36 AM EDT     My attending is Dr. Doe.    HISTORY OF PRESENT ILLNESS    Inés Pineda is a 85 y.o. female with PMHx of allergic rhinitis, anxiety, A-fib, CKD, hypertension, hypothyroidism presents to the emergency department with shortness of breath. 2 days feeling fatigued. Yesterday patient started with fever, runny nose, congestion, diarrhea after eating old soup from 2023 and shortness of breath which worsened tonight.  She denies cough, chest pain, palpitations, abdominal pain, nausea, vomiting, leg swelling. On Eliquis. 86% RA ambulating to room.  She denies recent hospitalizations/surgeries/travel    HPI    Nursing Notes were reviewed.    REVIEW OF SYSTEMS       Review of Systems   Constitutional:  Negative for appetite change, chills and fever.   HENT:  Positive for congestion and rhinorrhea. Negative for sore throat.    Respiratory:  Positive for shortness of breath and wheezing. Negative for cough.    Cardiovascular:  Negative for chest pain.   Gastrointestinal:  Positive for diarrhea. Negative for abdominal pain, blood in stool, nausea and vomiting.   Genitourinary:  Negative for difficulty urinating.   Musculoskeletal:  Negative for neck stiffness.   Skin:  Negative for color change and rash.   Neurological:  Negative for dizziness, syncope, weakness, light-headedness, numbness and headaches.   All other systems reviewed and are negative.            PAST MEDICAL HISTORY     Past Medical History:   Diagnosis Date    Allergic rhinitis     Anxiety     Atrial fibrillation (HCC)     Chronic kidney disease     Epistaxis 3/14/2017    Headache     Hypertension     Hypothyroidism     Inflamed seborrheic keratosis          SURGICAL HISTORY       Past Surgical History:   Procedure Laterality Date    HYSTERECTOMY

## 2025-07-01 NOTE — ED NOTES
Report given from night shift, 2nd lactate not sent due to normal lactate on first draw. Pt is alert and oriented, family at bedside. Requesting lip balm.

## 2025-07-01 NOTE — CONSULTS
DATE OF CONSULTATION  7/1/2025    CONSULTANT  Dr. August     REQUESTING PHYSICIAN  Azael Packer MD     PRIMARY CARDIOLOGIST  Dr. Gore    REASON FOR CONSULTATION  Chief Complaint   Patient presents with    Shortness of Breath       Hospital Day: 0       Patient is a 85 y.o. female with past medical history of persistent atrial fibrillation on Eliquis status post cardioversion, hypertension who presents with a chief complaint of fatigue. Patient is followed on a regular basis by Sandra Turner, APRN - CNP.     Cardiology was consulted for atrial fibrillation and CHF.    Patient was examined in her room.  She is on oxygen via nasal cannula.  Daughter at bedside.  Patient reports worsening fatigue and shortness of breath over the past 2 days.  She is not on home oxygen.   Denies any chest pain, nausea, vomiting, dizziness/lightheadedness, syncope, orthopnea, weight gain  + Swelling lower extremities at baseline  Patient reports being active at home, gardening without any difficulties.  Denies any nicotine, alcohol or illicit drug use.  No prior MIs or CVAs.    Patient is compliant with medications however takes Eliquis 2.5 mg p.o. once daily due to frequent nosebleeds.  Per chart review patient did not tolerate antiarrhythmics in the past.  She has a history of cardioversion.    Troponin negative x 2  BNP: 2905  CT chest: Cardiomegaly with interstitial edema and trace bilateral pleural effusions  TTE 2018: EF 60%    Past Medical History:   Diagnosis Date    Allergic rhinitis     Anxiety     Atrial fibrillation (HCC)     Chronic kidney disease     Epistaxis 3/14/2017    Headache     Hypertension     Hypothyroidism     Inflamed seborrheic keratosis       Patient Active Problem List   Diagnosis    Persistent atrial fibrillation (HCC)    Atopic rhinitis    Postmenopausal status    Calculus of gallbladder    Calculus of kidney    Benign essential hypertension    Abnormal finding on thyroid function test

## 2025-07-01 NOTE — ED NOTES
Pt presents to ER from home with SOB for the last three days. Pt denies any history of asthma or COPD but does have Afib. Pt has been under the weather and fatigued the last few days as well. Pt was brought back to room and placed on oxygen at this time due to low SPO2; pt denies any pain. Is A&Ox4, warm and dry with exertion on movement and sitting.

## 2025-07-02 LAB
ALBUMIN SERPL-MCNC: 4 G/DL (ref 3.5–4.6)
ALP SERPL-CCNC: 62 U/L (ref 40–130)
ALT SERPL-CCNC: 95 U/L (ref 0–33)
ANION GAP SERPL CALCULATED.3IONS-SCNC: 14 MEQ/L (ref 9–15)
AST SERPL-CCNC: 131 U/L (ref 0–35)
BASOPHILS # BLD: 0 K/UL (ref 0–0.2)
BASOPHILS NFR BLD: 0.3 %
BILIRUB SERPL-MCNC: 0.8 MG/DL (ref 0.2–0.7)
BUN SERPL-MCNC: 19 MG/DL (ref 8–23)
CALCIUM SERPL-MCNC: 8.4 MG/DL (ref 8.5–9.9)
CHLORIDE SERPL-SCNC: 89 MEQ/L (ref 95–107)
CO2 SERPL-SCNC: 26 MEQ/L (ref 20–31)
CREAT SERPL-MCNC: 0.79 MG/DL (ref 0.5–0.9)
EOSINOPHIL # BLD: 0.1 K/UL (ref 0–0.7)
EOSINOPHIL NFR BLD: 3.5 %
ERYTHROCYTE [DISTWIDTH] IN BLOOD BY AUTOMATED COUNT: 13.1 % (ref 11.5–14.5)
GLOBULIN SER CALC-MCNC: 2.7 G/DL (ref 2.3–3.5)
GLUCOSE SERPL-MCNC: 101 MG/DL (ref 70–99)
HCT VFR BLD AUTO: 42.3 % (ref 37–47)
HGB BLD-MCNC: 14.6 G/DL (ref 12–16)
LYMPHOCYTES # BLD: 0.3 K/UL (ref 1–4.8)
LYMPHOCYTES NFR BLD: 8.9 %
MAGNESIUM SERPL-MCNC: 2.2 MG/DL (ref 1.7–2.4)
MCH RBC QN AUTO: 30.6 PG (ref 27–31.3)
MCHC RBC AUTO-ENTMCNC: 34.5 % (ref 33–37)
MCV RBC AUTO: 88.7 FL (ref 79.4–94.8)
MONOCYTES # BLD: 0.6 K/UL (ref 0.2–0.8)
MONOCYTES NFR BLD: 14.9 %
NEUTROPHILS # BLD: 2.7 K/UL (ref 1.4–6.5)
NEUTS SEG NFR BLD: 71.9 %
PLATELET # BLD AUTO: 105 K/UL (ref 130–400)
POTASSIUM SERPL-SCNC: 3.2 MEQ/L (ref 3.4–4.9)
PROT SERPL-MCNC: 6.7 G/DL (ref 6.3–8)
RBC # BLD AUTO: 4.77 M/UL (ref 4.2–5.4)
SODIUM SERPL-SCNC: 129 MEQ/L (ref 135–144)
WBC # BLD AUTO: 3.7 K/UL (ref 4.8–10.8)

## 2025-07-02 PROCEDURE — 80053 COMPREHEN METABOLIC PANEL: CPT

## 2025-07-02 PROCEDURE — 2700000000 HC OXYGEN THERAPY PER DAY

## 2025-07-02 PROCEDURE — 85025 COMPLETE CBC W/AUTO DIFF WBC: CPT

## 2025-07-02 PROCEDURE — 83735 ASSAY OF MAGNESIUM: CPT

## 2025-07-02 PROCEDURE — 97166 OT EVAL MOD COMPLEX 45 MIN: CPT

## 2025-07-02 PROCEDURE — 6360000002 HC RX W HCPCS: Performed by: INTERNAL MEDICINE

## 2025-07-02 PROCEDURE — 99232 SBSQ HOSP IP/OBS MODERATE 35: CPT | Performed by: INTERNAL MEDICINE

## 2025-07-02 PROCEDURE — 36415 COLL VENOUS BLD VENIPUNCTURE: CPT

## 2025-07-02 PROCEDURE — 6370000000 HC RX 637 (ALT 250 FOR IP)

## 2025-07-02 PROCEDURE — 97162 PT EVAL MOD COMPLEX 30 MIN: CPT

## 2025-07-02 PROCEDURE — 6370000000 HC RX 637 (ALT 250 FOR IP): Performed by: INTERNAL MEDICINE

## 2025-07-02 PROCEDURE — 2500000003 HC RX 250 WO HCPCS

## 2025-07-02 PROCEDURE — 2060000000 HC ICU INTERMEDIATE R&B

## 2025-07-02 RX ORDER — DILTIAZEM HYDROCHLORIDE 120 MG/1
120 CAPSULE, COATED, EXTENDED RELEASE ORAL DAILY
Status: DISCONTINUED | OUTPATIENT
Start: 2025-07-02 | End: 2025-07-02

## 2025-07-02 RX ORDER — FUROSEMIDE 10 MG/ML
40 INJECTION INTRAMUSCULAR; INTRAVENOUS 2 TIMES DAILY
Status: DISCONTINUED | OUTPATIENT
Start: 2025-07-02 | End: 2025-07-03 | Stop reason: HOSPADM

## 2025-07-02 RX ADMIN — APIXABAN 5 MG: 5 TABLET, FILM COATED ORAL at 20:07

## 2025-07-02 RX ADMIN — SODIUM CHLORIDE, PRESERVATIVE FREE 10 ML: 5 INJECTION INTRAVENOUS at 20:07

## 2025-07-02 RX ADMIN — Medication 3 MG: at 20:07

## 2025-07-02 RX ADMIN — METOPROLOL SUCCINATE 150 MG: 50 TABLET, EXTENDED RELEASE ORAL at 08:16

## 2025-07-02 RX ADMIN — OXYCODONE HYDROCHLORIDE AND ACETAMINOPHEN 1000 MG: 500 TABLET ORAL at 08:16

## 2025-07-02 RX ADMIN — FUROSEMIDE 40 MG: 10 INJECTION, SOLUTION INTRAMUSCULAR; INTRAVENOUS at 18:00

## 2025-07-02 RX ADMIN — APIXABAN 5 MG: 5 TABLET, FILM COATED ORAL at 08:16

## 2025-07-02 RX ADMIN — CHOLECALCIFEROL (VITAMIN D3) 10 MCG (400 UNIT) TABLET 400 UNITS: at 18:00

## 2025-07-02 RX ADMIN — CYANOCOBALAMIN TAB 500 MCG 1000 MCG: 500 TAB at 08:17

## 2025-07-02 RX ADMIN — SERTRALINE 100 MG: 100 TABLET, FILM COATED ORAL at 08:17

## 2025-07-02 RX ADMIN — POTASSIUM CHLORIDE 40 MEQ: 1500 TABLET, EXTENDED RELEASE ORAL at 08:17

## 2025-07-02 RX ADMIN — FUROSEMIDE 40 MG: 10 INJECTION, SOLUTION INTRAMUSCULAR; INTRAVENOUS at 08:16

## 2025-07-02 RX ADMIN — LEVOTHYROXINE SODIUM 25 MCG: 0.03 TABLET ORAL at 08:17

## 2025-07-02 ASSESSMENT — ENCOUNTER SYMPTOMS
NAUSEA: 0
WHEEZING: 0
SHORTNESS OF BREATH: 1
VOMITING: 0
CHEST TIGHTNESS: 0

## 2025-07-02 ASSESSMENT — PAIN SCALES - GENERAL: PAINLEVEL_OUTOF10: 0

## 2025-07-02 NOTE — PROGRESS NOTES
DATE OF CONSULTATION  7/2/2025    CONSULTANT  Dr. August     REQUESTING PHYSICIAN  Azael Packer MD     PRIMARY CARDIOLOGIST  Dr. Gore    REASON FOR CONSULTATION  Chief Complaint   Patient presents with    Shortness of Breath       Hospital Day: 1       Patient is a 85 y.o. female with past medical history of persistent atrial fibrillation on Eliquis status post cardioversion, hypertension who presents with a chief complaint of fatigue. Patient is followed on a regular basis by Sandra Turner, APRN - CNP.     Cardiology was consulted for atrial fibrillation and CHF.    Patient was examined in her room.  She is on oxygen via nasal cannula.  Daughter at bedside.  Patient reports worsening fatigue and shortness of breath over the past 2 days.  She is not on home oxygen.   Denies any chest pain, nausea, vomiting, dizziness/lightheadedness, syncope, orthopnea, weight gain  + Swelling lower extremities at baseline  Patient reports being active at home, gardening without any difficulties.  Denies any nicotine, alcohol or illicit drug use.  No prior MIs or CVAs.    Patient is compliant with medications however takes Eliquis 2.5 mg p.o. once daily due to frequent nosebleeds.  Per chart review patient did not tolerate antiarrhythmics in the past.  She has a history of cardioversion.    Troponin negative x 2  BNP: 2905  CT chest: Cardiomegaly with interstitial edema and trace bilateral pleural effusions  TTE 2018: EF 60%  ====================  Hospital course  7/2/2025  Patient lying in bed  Reports no chest pain, still nasal cannula  Level of energy improving which is patient's main concern  Also patient inquiring about her Norvasc.  Per patient it controls her A-fib very well  Telemetry atrial fibrillation well-controlled in the 80    Past Medical History:   Diagnosis Date    Allergic rhinitis     Anxiety     Atrial fibrillation (HCC)     Chronic kidney disease     Epistaxis 3/14/2017    Headache      exposure control, iterative reconstruction, and/or weight based adjustment of the mA/kV was utilized to reduce the radiation dose to as low as reasonably achievable. COMPARISON: 06/08/2024 HISTORY: ORDERING SYSTEM PROVIDED HISTORY: PE TECHNOLOGIST PROVIDED HISTORY: Reason for exam:->PE Decision Support Exception - unselect if not a suspected or confirmed emergency medical condition->Emergency Medical Condition (MA) What reading provider will be dictating this exam?->CRC FINDINGS: Pulmonary Arteries: Pulmonary arteries are adequately opacified for evaluation.  No evidence of intraluminal filling defect to suggest pulmonary embolism.  Main pulmonary artery is normal in caliber. Mediastinum: No evidence of mediastinal lymphadenopathy.  Thyroid is homogeneous in appearance.  Vascular calcifications seen within the thoracic aorta.  Coronary artery calcifications identified.  The cardiac chambers are enlarged.  No pericardial effusion.  Prominence identified of lymph nodes in the hilar regions.  Findings likely reactive.  The heart and pericardium demonstrate no acute abnormality.  There is no acute abnormality of the thoracic aorta. Lungs/pleura: The lungs reveal interseptal thickening identified diffusely throughout the lung fields.  There is trace bilateral pleural effusions with atelectatic changes identified lung bases.  Findings suggesting interstitial edema.  No focal parenchymal opacification present. Upper Abdomen: Limited images of the upper abdomen reveals stones in the gallbladder. Soft Tissues/Bones: Multilevel degenerative changes identified within the spine.  No acute chest wall abnormality.     1. No evidence of pulmonary embolism. 2. Cardiomegaly with interstitial edema and trace bilateral pleural effusions. 3. Cholelithiasis.     XR CHEST PORTABLE  Result Date: 7/1/2025  EXAMINATION: ONE XRAY VIEW OF THE CHEST 7/1/2025 2:48 am COMPARISON: June 11, 2024 HISTORY: ORDERING SYSTEM PROVIDED HISTORY: SOB

## 2025-07-02 NOTE — CARE COORDINATION
I met with the patient and her daughters.   Definition of congestive heart failure explained.   Common causes such as arrhythmias, valve disease, alcohol and drug use, coronary artery disease, hypertension, previous MI, and sleep apnea discussed. I encouraged the patient to speak with their cardiologist to determine what caused their failure.  Signs and symptoms of CHF, such as fatigue, cough, shortness of breath, edema, weight gain reviewed with patient. Patient reports that their symptoms were shortness of breath and fatigue.  We talked about the importance of medication compliance.   Low sodium diet of 2,000 mg/day encouraged. Nutrition label reviewed with patient with explanation that the ingredient amounts are listed per serving. Patient was shown how to calculate the number of milligrams of sodium on the nutrition label. Hints for decreasing sodium intake provided such as avoiding canned or processed foods. Fresh meats, vegetables, and fruits encouraged. We also discussed salt free seasonings and cautious use of salt substitutes. The patient is to ask his/her cardiologist if these are safe for them. A general shopping guide was also provided with food choice recommendations that assist with maintaining a low sodium diet.   A low-fat diet was also encouraged.  Hints for avoiding high sodium meals at a restaurant were provided. These included: ordering fresh salads and fruits, food without seasoning (bring their own salt free seasonings), avoiding fried foods, and asking for sauce on the side. The patient may also ask the  for healthier choices, but must understand that the  is not necessarily an expert.   I encouraged the patient to remain active and also be aware of their limitations. Before starting an exercise regimen, they should consult with their doctor. If their activity tolerance begins to decrease, they should call their doctor right away.   This patient quit smoking.   Stress reduction

## 2025-07-02 NOTE — PROGRESS NOTES
[facility] OCCUPATIONAL THERAPY EVALUATION - ACUTE     NAME: Inés Pineda  : 1940 (85 y.o.)  MRN: [id number]  CODE STATUS: Full Code  Room: [facility]/W168-01    Date of Service: 2025    Patient Diagnosis(es): Shortness of breath [R06.02]  Hyponatremia [E87.1]  Atrial fibrillation with rapid ventricular response (HCC) [I48.91]  Atrial fibrillation with RVR (HCC) [I48.91]  New onset of congestive heart failure (HCC) [I50.9]  Congestive heart failure, unspecified HF chronicity, unspecified heart failure type (HCC) [I50.9]   Patient Active Problem List    Diagnosis Date Noted    Atrial fibrillation with RVR (HCC) 2025    Conductive hearing loss, bilateral 06/15/2021    Sensorineural hearing loss (SNHL) of both ears 06/15/2021    Ataxia 2018    A-fib (HCC) 2018    Inflamed seborrheic keratosis     Atopic rhinitis 2016    Calculus of gallbladder 2016    Calculus of kidney 2016    Benign essential hypertension 2016    Anxiety state 2016    Persistent atrial fibrillation (HCC) 2016    Abnormal finding on thyroid function test 2005    Postmenopausal status 2005      Atrial fibrillation with RVR (HCC)  New onset of congestive heart failure (HCC)  Hyponatremia  Congestive heart failure, unspecified HF chronicity, unspecified heart failure type (HCC)  Shortness of breath    Past Medical History:   Diagnosis Date    Allergic rhinitis     Anxiety     Atrial fibrillation (HCC)     Chronic kidney disease     Epistaxis 3/14/2017    Headache     Hypertension     Hypothyroidism     Inflamed seborrheic keratosis      Past Surgical History:   Procedure Laterality Date    HYSTERECTOMY (CERVIX STATUS UNKNOWN)          Restrictions  Restrictions/Precautions: Fall Risk                 Safety Devices: Safety Devices  Type of Devices: All fall risk precautions in place;Call light within reach;Left in bed     Treatment Diagnosis:   Decreased Activities of Daily Living  Z73.6    Patient's date of birth confirmed: Yes    General:       Subjective:\"I was just doing too much and I got tired.\"  Daughters present for evaluation.  \"My legs hurt.\"          Pain at start of treatment: No    Pain at end of treatment: No    Location: bilateral calves with activity  Description: tight  Nursing notified: No  RN: N/A  Intervention: Repositioned    Prior Level of Function:  Social/Functional History  Lives With: Alone  Type of Home: House  Home Layout: One level  Home Access: Level entry  Bathroom Shower/Tub: Walk-in shower, Tub/Shower unit (Pt reports that she bathes in the tub but family disagrees.)  Bathroom Equipment: Hand-held shower  Home Equipment:  (Pt has an adjustable bed)  Has the patient had two or more falls in the past year or any fall with injury in the past year?: Yes  Prior Level of Assist for ADLs: Independent  Prior Level of Assist for Homemaking: Independent  Homemaking Responsibilities: Yes  Prior Level of Assist for Ambulation: Independent household ambulator, with or without device  Prior Level of Assist for Transfers: Independent  Active : No  Additional Comments: Family assists with grocery shopping.  Family reports that they are unsure how many times that patient has fallen.    OBJECTIVE:     Orientation Status:  Orientation  Overall Orientation Status: Within Functional Limits  Orientation Level: Oriented X4    Observation:  Observation/Palpation  Posture: Fair  Observation: Mild flexion noted    Cognition Status:  Cognition  Overall Cognitive Status: WFL    Perception Status:  Perception  Overall Perceptual Status: WFL    Vision and Hearing Status:  Vision  Vision: Impaired  Vision Exceptions: Wears glasses at all times   Vision - Basic Assessment  Prior Vision: Wears glasses all the time  Visual History: No significant visual history  Patient Visual Report: No visual complaint reported.  Visual Field Cut: No  Oculo Motor Control: WNL   Hearing  Hearing:

## 2025-07-02 NOTE — ACP (ADVANCE CARE PLANNING)
Advance Care Planning   Healthcare Decision Maker:    Primary Decision Maker: Hawk Castro - Child - [phone]    Primary Decision Maker: eufemia castro - Child - [phone]    Click here to complete Healthcare Decision Makers including selection of the Healthcare Decision Maker Relationship (ie \"Primary\").  Today we documented Decision Maker(s) consistent with Legal Next of Kin hierarchy.       Electronically signed by VALENTINA Hickman on 7/2/2025 at 9:24 AM

## 2025-07-02 NOTE — CARE COORDINATION
discuss the discharge plan with any other family members/significant others, and if so, who? Yes (dtr)  Plans to Return to Present Housing: Yes  Other Identified Issues/Barriers to RETURNING to current housing: MEDICAL CLEARANCE   Potential Assistance needed at discharge: Outpatient PT/OT            Potential DME:    Patient expects to discharge to: House  Plan for transportation at discharge:      Financial    Payor: [employer] MEDICARE / Plan: [employer] GOLD PLUS HMO / Product Type: *No Product type* /     Does insurance require precert for SNF: Yes    Potential assistance Purchasing Medications: No  Meds-to-Beds request: Yes      [facility] - [facility] [address] - P [phone] - F [phone]  [address] [facility] [address]  Phone: [phone] Fax: [phone]    [facility] [id number] [place], [place] - [address] [facility] [phone] - F [phone]  [address] [facility] [address]  Phone: [phone] Fax: [phone]    [facility] [address] - [place], [place] - [address] [phone] - F [phone]  [address] [facility] [address]  Phone: [phone] Fax: [phone]      Notes:    Factors facilitating achievement of predicted outcomes: Family support, Motivated, Cooperative, Pleasant, Sense of humor, and Good insight into deficits    Barriers to discharge: Medical complications    Additional Case Management Notes: PT LIVES AT HOME ALONE; INDEPENDENT AT BASELINE. NO DME; NO HOME O2; NO HD; NOT ; NO PIROR SERVICES;   DISCUSSED DC PLAN WITH PT. PT AGREED WITH DC PLAN HOME ALONE. OPEN TO HHC AND SNF IF NEED IT. PENDING PT/OT EVAL.   PT CURRENTLY ON 6L O2 AT THIS TIME. POSSIBLE NEED FOR HOME O2 EVAL AT DC.     The Plan for Transition of Care is related to the following treatment goals of Shortness of breath [R06.02]  Hyponatremia [E87.1]  Atrial fibrillation with rapid ventricular response (HCC) [I48.91]  Atrial fibrillation with  RVR (HCC) [I48.91]  New onset of congestive heart failure (HCC) [I50.9]  Congestive heart failure, unspecified HF chronicity, unspecified heart failure type (HCC) [I50.9]    IF APPLICABLE: The Patient and/or patient representative Inés and her family were provided with a choice of provider and agrees with the discharge plan. Freedom of choice list with basic dialogue that supports the patient's individualized plan of care/goals and shares the quality data associated with the providers was provided to:     Patient Representative Name:       The Patient and/or Patient Representative Agree with the Discharge Plan?      VALENTINA Hickman  Case Management Department  Ph:  Fax:

## 2025-07-02 NOTE — PROGRESS NOTES
Hospitalist Progress Note      Date of Admission: 7/1/2025  Chief Complaint:    Chief Complaint   Patient presents with    Shortness of Breath     Subjective:  No new complaints.  No nausea, vomiting, chest pain, or headache      Medications:    Infusion Medications    sodium chloride       Scheduled Medications    furosemide  40 mg IntraVENous BID    sodium chloride flush  5-40 mL IntraVENous 2 times per day    melatonin  3 mg Oral Nightly    apixaban  5 mg Oral BID    vitamin C  1,000 mg Oral Daily    vitamin B-12  1,000 mcg Oral Daily    levothyroxine  25 mcg Oral Daily    sertraline  100 mg Oral Daily    cholecalciferol  400 Units Oral Daily    metoprolol succinate  150 mg Oral Daily     PRN Meds: sodium chloride flush, sodium chloride, potassium chloride **OR** potassium alternative oral replacement **OR** potassium chloride, magnesium sulfate, ondansetron **OR** ondansetron, polyethylene glycol, acetaminophen **OR** acetaminophen    Intake/Output Summary (Last 24 hours) at 7/2/2025 1835  Last data filed at 7/2/2025 1715  Gross per 24 hour   Intake 600 ml   Output --   Net 600 ml     Exam:  BP 99/72   Pulse 75   Temp 97.7 °F (36.5 °C) (Oral)   Resp 20   Ht 1.575 m (5' 2\")   Wt 60.2 kg (132 lb 11.5 oz)   SpO2 96%   BMI 24.27 kg/m²   Head: Normocephalic, atraumatic  Sclera clear  Neck JVD flat  Lungs: normal effort of breathing    Labs:   Recent Labs     07/01/25 0253 07/01/25 0315 07/02/25  0521   WBC 3.1*  --  3.7*   HGB 14.3 13.3 14.6   HCT 42.4  --  42.3   *  --  105*     Recent Labs     07/01/25  0253 07/01/25  0315 07/02/25  0521   *  --  129*   K 3.9  --  3.2*   CL 94*  --  89*   CO2 20  --  26   BUN 11  --  19   CREATININE 0.76 0.7 0.79   CALCIUM 8.5  --  8.4*   AST 72*  --  131*   ALT 48*  --  95*   BILITOT 0.6  --  0.8*   ALKPHOS 70  --  62     No results for input(s): \"INR\" in the last 72 hours.  No results for input(s): \"CKTOTAL\", \"TROPONINI\" in the last 72  hours.  Radiology:  CTA CHEST W WO CONTRAST PE Eval   Final Result   1. No evidence of pulmonary embolism.   2. Cardiomegaly with interstitial edema and trace bilateral pleural effusions.   3. Cholelithiasis.         XR CHEST PORTABLE   Final Result   No acute cardiopulmonary process.           Assessment/Plan:    A-fib RVR: Rate now better controlled.  On anticoagulation.  Cardiology on consult and will be managing this condition.    HFpEF: On IV diuresis, cardiology on consult and managing this condition    Mild hyponatremia: Uptrending, asymptomatic    HTN: monitor BP, adjust meds as needed'  Hypothyroidism: Continue home medication    JACQUELINE NEELY MD ,MD

## 2025-07-02 NOTE — PROGRESS NOTES
Physical Therapy Med Surg Initial Assessment  Facility/Department: [address] TELEMETRY  Room: [place] [address]       NAME: Inés Pineda  : 1940 (85 y.o.)  MRN: [id number]  CODE STATUS: Full Code    Date of Service: 2025    Patient Diagnosis(es): Shortness of breath [R06.02]  Hyponatremia [E87.1]  Atrial fibrillation with rapid ventricular response (HCC) [I48.91]  Atrial fibrillation with RVR (HCC) [I48.91]  New onset of congestive heart failure (HCC) [I50.9]  Congestive heart failure, unspecified HF chronicity, unspecified heart failure type (HCC) [I50.9]   Chief Complaint   Patient presents with    Shortness of Breath     Patient Active Problem List    Diagnosis Date Noted    Atrial fibrillation with RVR (HCC) 2025    Conductive hearing loss, bilateral 06/15/2021    Sensorineural hearing loss (SNHL) of both ears 06/15/2021    Ataxia 2018    A-fib (HCC) 2018    Inflamed seborrheic keratosis     Atopic rhinitis 2016    Calculus of gallbladder 2016    Calculus of kidney 2016    Benign essential hypertension 2016    Anxiety state 2016    Persistent atrial fibrillation (HCC) 2016    Abnormal finding on thyroid function test 2005    Postmenopausal status 2005        Past Medical History:   Diagnosis Date    Allergic rhinitis     Anxiety     Atrial fibrillation (HCC)     Chronic kidney disease     Epistaxis 3/14/2017    Headache     Hypertension     Hypothyroidism     Inflamed seborrheic keratosis      Past Surgical History:   Procedure Laterality Date    HYSTERECTOMY (CERVIX STATUS UNKNOWN)         Chart Reviewed: Yes  Family/Caregiver Present: Yes  General  General Comments: Pt supine in bed upon arrival, pleasant and agreeable to therapy    Restrictions:  Restrictions/Precautions: Fall Risk     SUBJECTIVE:   Subjective: I have to go to the bathroom    Pain  Pain  Pre-Pain: 0  Post-Pain: 0       Prior Level of Function:  Social/Functional

## 2025-07-02 NOTE — PLAN OF CARE
Problem: Discharge Planning  Goal: Discharge to home or other facility with appropriate resources  7/1/2025 2157 by Vito Perez RN  Outcome: Progressing  7/1/2025 1300 by Milady Feliciano RN  Outcome: Progressing     Problem: Safety - Adult  Goal: Free from fall injury  7/1/2025 2157 by Vito Perez RN  Outcome: Progressing  7/1/2025 1300 by Milady Feliciano RN  Outcome: Progressing     Problem: ABCDS Injury Assessment  Goal: Absence of physical injury  7/1/2025 2157 by Vito Perez RN  Outcome: Progressing  7/1/2025 1300 by Milady Feliciano RN  Outcome: Progressing     Problem: Pain  Goal: Verbalizes/displays adequate comfort level or baseline comfort level  Outcome: Progressing

## 2025-07-03 ENCOUNTER — APPOINTMENT (OUTPATIENT)
Dept: GENERAL RADIOLOGY | Age: 85
DRG: 189 | End: 2025-07-03
Payer: MEDICARE

## 2025-07-03 VITALS
BODY MASS INDEX: 23.37 KG/M2 | OXYGEN SATURATION: 94 % | HEART RATE: 52 BPM | HEIGHT: 62 IN | RESPIRATION RATE: 18 BRPM | TEMPERATURE: 97.3 F | SYSTOLIC BLOOD PRESSURE: 109 MMHG | DIASTOLIC BLOOD PRESSURE: 62 MMHG | WEIGHT: 126.98 LBS

## 2025-07-03 LAB
ALBUMIN SERPL-MCNC: 3.8 G/DL (ref 3.5–4.6)
ALP SERPL-CCNC: 57 U/L (ref 40–130)
ALT SERPL-CCNC: 76 U/L (ref 0–33)
ANION GAP SERPL CALCULATED.3IONS-SCNC: 13 MEQ/L (ref 9–15)
AST SERPL-CCNC: 72 U/L (ref 0–35)
BASOPHILS # BLD: 0 K/UL (ref 0–0.2)
BASOPHILS NFR BLD: 0.9 %
BILIRUB SERPL-MCNC: 0.9 MG/DL (ref 0.2–0.7)
BUN SERPL-MCNC: 29 MG/DL (ref 8–23)
CALCIUM SERPL-MCNC: 8.4 MG/DL (ref 8.5–9.9)
CHLORIDE SERPL-SCNC: 89 MEQ/L (ref 95–107)
CO2 SERPL-SCNC: 27 MEQ/L (ref 20–31)
CREAT SERPL-MCNC: 0.87 MG/DL (ref 0.5–0.9)
EOSINOPHIL # BLD: 0.2 K/UL (ref 0–0.7)
EOSINOPHIL NFR BLD: 5.1 %
ERYTHROCYTE [DISTWIDTH] IN BLOOD BY AUTOMATED COUNT: 12.7 % (ref 11.5–14.5)
GLOBULIN SER CALC-MCNC: 2.9 G/DL (ref 2.3–3.5)
GLUCOSE SERPL-MCNC: 94 MG/DL (ref 70–99)
HCT VFR BLD AUTO: 43.1 % (ref 37–47)
HGB BLD-MCNC: 15 G/DL (ref 12–16)
LYMPHOCYTES # BLD: 1 K/UL (ref 1–4.8)
LYMPHOCYTES NFR BLD: 21.1 %
MAGNESIUM SERPL-MCNC: 2.2 MG/DL (ref 1.7–2.4)
MCH RBC QN AUTO: 31.4 PG (ref 27–31.3)
MCHC RBC AUTO-ENTMCNC: 34.8 % (ref 33–37)
MCV RBC AUTO: 90.4 FL (ref 79.4–94.8)
MONOCYTES # BLD: 1.3 K/UL (ref 0.2–0.8)
MONOCYTES NFR BLD: 27.1 %
NEUTROPHILS # BLD: 2.1 K/UL (ref 1.4–6.5)
NEUTS SEG NFR BLD: 45.4 %
PLATELET # BLD AUTO: 115 K/UL (ref 130–400)
POTASSIUM SERPL-SCNC: 3.3 MEQ/L (ref 3.4–4.9)
PROT SERPL-MCNC: 6.7 G/DL (ref 6.3–8)
RBC # BLD AUTO: 4.77 M/UL (ref 4.2–5.4)
SODIUM SERPL-SCNC: 129 MEQ/L (ref 135–144)
WBC # BLD AUTO: 4.7 K/UL (ref 4.8–10.8)

## 2025-07-03 PROCEDURE — 6360000002 HC RX W HCPCS: Performed by: INTERNAL MEDICINE

## 2025-07-03 PROCEDURE — 6370000000 HC RX 637 (ALT 250 FOR IP): Performed by: INTERNAL MEDICINE

## 2025-07-03 PROCEDURE — 6370000000 HC RX 637 (ALT 250 FOR IP)

## 2025-07-03 PROCEDURE — 2700000000 HC OXYGEN THERAPY PER DAY

## 2025-07-03 PROCEDURE — 71045 X-RAY EXAM CHEST 1 VIEW: CPT

## 2025-07-03 PROCEDURE — 6360000002 HC RX W HCPCS

## 2025-07-03 PROCEDURE — 99232 SBSQ HOSP IP/OBS MODERATE 35: CPT | Performed by: INTERNAL MEDICINE

## 2025-07-03 PROCEDURE — 85025 COMPLETE CBC W/AUTO DIFF WBC: CPT

## 2025-07-03 PROCEDURE — 80053 COMPREHEN METABOLIC PANEL: CPT

## 2025-07-03 PROCEDURE — 83735 ASSAY OF MAGNESIUM: CPT

## 2025-07-03 PROCEDURE — 36415 COLL VENOUS BLD VENIPUNCTURE: CPT

## 2025-07-03 RX ORDER — FUROSEMIDE 20 MG/1
40 TABLET ORAL 2 TIMES DAILY
Qty: 180 TABLET | Refills: 5 | Status: SHIPPED | OUTPATIENT
Start: 2025-07-03

## 2025-07-03 RX ORDER — METOPROLOL SUCCINATE 50 MG/1
150 TABLET, EXTENDED RELEASE ORAL DAILY
Qty: 30 TABLET | Refills: 3 | Status: SHIPPED | OUTPATIENT
Start: 2025-07-04

## 2025-07-03 RX ADMIN — POTASSIUM CHLORIDE 10 MEQ: 7.46 INJECTION, SOLUTION INTRAVENOUS at 09:29

## 2025-07-03 RX ADMIN — ACETAMINOPHEN 650 MG: 325 TABLET ORAL at 01:26

## 2025-07-03 RX ADMIN — LEVOTHYROXINE SODIUM 25 MCG: 0.03 TABLET ORAL at 07:44

## 2025-07-03 RX ADMIN — METOPROLOL SUCCINATE 150 MG: 50 TABLET, EXTENDED RELEASE ORAL at 07:44

## 2025-07-03 RX ADMIN — CYANOCOBALAMIN TAB 500 MCG 1000 MCG: 500 TAB at 07:45

## 2025-07-03 RX ADMIN — APIXABAN 5 MG: 5 TABLET, FILM COATED ORAL at 07:45

## 2025-07-03 RX ADMIN — OXYCODONE HYDROCHLORIDE AND ACETAMINOPHEN 1000 MG: 500 TABLET ORAL at 07:44

## 2025-07-03 RX ADMIN — SERTRALINE 100 MG: 100 TABLET, FILM COATED ORAL at 09:29

## 2025-07-03 RX ADMIN — FUROSEMIDE 40 MG: 10 INJECTION, SOLUTION INTRAMUSCULAR; INTRAVENOUS at 07:44

## 2025-07-03 RX ADMIN — POTASSIUM CHLORIDE 10 MEQ: 7.46 INJECTION, SOLUTION INTRAVENOUS at 07:56

## 2025-07-03 ASSESSMENT — ENCOUNTER SYMPTOMS
VOMITING: 0
WHEEZING: 0
CHEST TIGHTNESS: 0
SHORTNESS OF BREATH: 1
NAUSEA: 0

## 2025-07-03 NOTE — PROGRESS NOTES
DATE OF CONSULTATION  7/3/2025    CONSULTANT  Dr. August     REQUESTING PHYSICIAN  Azael Packer MD     PRIMARY CARDIOLOGIST  Dr. Gore    REASON FOR CONSULTATION  Chief Complaint   Patient presents with    Shortness of Breath       Hospital Day: 2       Patient is a 85 y.o. female with past medical history of persistent atrial fibrillation on Eliquis status post cardioversion, hypertension who presents with a chief complaint of fatigue. Patient is followed on a regular basis by Sandra Turner, APRN - CNP.     Cardiology was consulted for atrial fibrillation and CHF.    Patient was examined in her room.  She is on oxygen via nasal cannula.  Daughter at bedside.  Patient reports worsening fatigue and shortness of breath over the past 2 days.  She is not on home oxygen.   Denies any chest pain, nausea, vomiting, dizziness/lightheadedness, syncope, orthopnea, weight gain  + Swelling lower extremities at baseline  Patient reports being active at home, gardening without any difficulties.  Denies any nicotine, alcohol or illicit drug use.  No prior MIs or CVAs.    Patient is compliant with medications however takes Eliquis 2.5 mg p.o. once daily due to frequent nosebleeds.  Per chart review patient did not tolerate antiarrhythmics in the past.  She has a history of cardioversion.    Troponin negative x 2  BNP: 2905  CT chest: Cardiomegaly with interstitial edema and trace bilateral pleural effusions  TTE 2018: EF 60%  ====================  Hospital course  7/3/2025  Patient sitting in bed  Reports feeling better  Denies chest pain  Telemetry atrial fibrillation rate controlled in the 80s  Chest x-ray today shows significant improvement compared to prior        7/2/2025  Patient lying in bed  Reports no chest pain, still nasal cannula  Level of energy improving which is patient's main concern  Also patient inquiring about her Norvasc.  Per patient it controls her A-fib very well  Telemetry atrial fibrillation  Normal rate. Rhythm irregular.   Pulmonary:      Effort: Pulmonary effort is normal. No respiratory distress.   Skin:     General: Skin is warm.   Neurological:      General: No focal deficit present.      Mental Status: She is alert.   Psychiatric:         Mood and Affect: Mood normal.         Behavior: Behavior normal.         LABS:    Pertinent Labs:  CBC:   Recent Labs     07/03/25  0526   WBC 4.7*   HGB 15.0   *     BMP:  Recent Labs     07/03/25 0526   *   K 3.3*   CL 89*   CO2 27   BUN 29*   CREATININE 0.87   GLUCOSE 94   LABGLOM 65.9     INR: No results for input(s): \"INR\" in the last 72 hours.  BNP: No results for input(s): \"BNP\" in the last 72 hours.   TSH:   Lab Results   Component Value Date    TSH 1.620 07/01/2025      Cardiac Injury Profile: No results for input(s): \"CKTOTAL\", \"CKMB\", \"CKMBINDEX\", \"TROPONINI\" in the last 72 hours.   Troponin:   Lab Results   Component Value Date/Time    TROPONINI <0.010 06/08/2016 01:52 PM     Lipid Profile:   Lab Results   Component Value Date/Time    TRIG 72 12/28/2015 01:31 PM    HDL 53 12/28/2015 01:31 PM    CHOL 177 12/28/2015 01:31 PM      Hemoglobin A1C: No components found for: \"HGBA1C\"       Radiology:  XR CHEST PORTABLE  Result Date: 7/3/2025  EXAMINATION: ONE XRAY VIEW OF THE CHEST 7/3/2025 9:13 am COMPARISON: 07/01/2025 HISTORY: ORDERING SYSTEM PROVIDED HISTORY: SOB TECHNOLOGIST PROVIDED HISTORY: Reason for exam:->SOB What reading provider will be dictating this exam?->CRC FINDINGS: Portable chest reveals cardiac and mediastinal silhouettes within normal limits.  Improvement seen in the pulmonary vascularity when compared to the prior study.  No new focal parenchymal opacification present.  Degenerative changes seen within the spine.  No pleural effusion or pneumothorax.     Improvement in the pulmonary vascularity when compared to the prior study. No new focal parenchymal opacification present.     Echo (TTE) complete (PRN  effusions  TTE 7/1/2025 EF 50 to 55%  Chest x-ray today 7/3/2025 significantly improved compared to admission  Switch Lasix 40 mg IV twice daily to 40 mg p.o. twice daily  Continue metoprolol  Ischemic evaluation as an outpatient      Atrial fibrillation.  With prior unsuccessful cardioversions.  Currently rate controlled  Continue metoprolol currently 150 mg daily  Not on full dose anticoagulation secondary to frequent epistaxis      Other medical problems  Hypertension  Hyperlipidemia  Obesity  Abnormal LFTs      From cardiology standpoint patient can be discharged home  Follow-up with Dr. Parson in 2 weeks    Thank you for allowing me to participate in the care of your patient, please don't hesitate to contact me if you have any further questions.      Electronically signed by Ritesh August MD on 7/3/2025 at 12:08 PM

## 2025-07-03 NOTE — PROGRESS NOTES
[facility]  Occupational Therapy        NAME:  Inés Pineda  ROOM: W168/W168-01  :  1940  DATE: 7/3/2025    Attempted to see Inés Pineda at 13:11 on this date for:   []  Initial Evaluation   [x]  Treatment     Patient was unable to be seen due to:   [] Off unit for testing/procedure    [x] Patient declined   [] Therapy on hold due to     [] Nursing deferred due to    [] Other:      Pt sitting on the EOB upon arrival with two family members present. Completed introductions and educated pt and family members on OT and POC. Pt reported she is going home later and declined OT needs. Family in agreement. Pt agreeable to D/C from OT d/t no ADL concerns.     Electronically signed by LAURA Reina on 7/3/25 at 1:15 PM EDT

## 2025-07-03 NOTE — PLAN OF CARE
Problem: Discharge Planning  Goal: Discharge to home or other facility with appropriate resources  7/2/2025 2147 by Vito Perez RN  Outcome: Progressing  7/2/2025 1158 by Iwona Rabago RN  Outcome: Progressing     Problem: Safety - Adult  Goal: Free from fall injury  7/2/2025 2147 by Vito Perez RN  Outcome: Progressing  7/2/2025 1158 by Iwona Rabago RN  Outcome: Progressing     Problem: ABCDS Injury Assessment  Goal: Absence of physical injury  7/2/2025 2147 by Vito Perez RN  Outcome: Progressing  7/2/2025 1158 by Iwona Rabago RN  Outcome: Progressing     Problem: Pain  Goal: Verbalizes/displays adequate comfort level or baseline comfort level  7/2/2025 2147 by Vito Perez RN  Outcome: Progressing  7/2/2025 1158 by Iwona Rabago RN  Outcome: Progressing

## 2025-07-03 NOTE — PROGRESS NOTES
Physical Therapy  Facility/Department: [facility] MED SURG W168/W168-01  Physical Therapy Discharge      NAME: Inés Pineda    : 1940 (85 y.o.)  MRN: [id number]    Account: [id number]  Gender: female      Patient has been discharged from acute care hospital. DC patient from current PT program.      Electronically signed by Charisma Ma, PT on 7/3/25 at 3:52 PM EDT

## 2025-07-03 NOTE — CARE COORDINATION
CM ENTERED THE ROOM TO DISCUSS DC PLANNING.  PT AND FAMILY DENY HH SERVICES.  PT IS OPEN TO HAVING A SCRIPT FOR A FWW.  CM WILL PROVIDE THIS.  PT IS STILL ON IV LASIX AND AND PENDING STAT CHEST XRAY.  PT REMAINS ON 3L.  WILL NEED TO MONITOR FOR THIS.

## 2025-07-03 NOTE — PROGRESS NOTES
CLINICAL PHARMACY NOTE: MEDS TO BEDS    Total # of Prescriptions Filled: 2   The following medications were delivered to the patient:  Furosemide 20 mg Tab  Metoprolol Succ Er 50 mg Tab    Additional Documentation:

## 2025-07-03 NOTE — PLAN OF CARE
Problem: Discharge Planning  Goal: Discharge to home or other facility with appropriate resources  7/3/2025 1025 by Elise Velasquez RN  Outcome: Progressing  7/2/2025 2147 by Vito Perez RN  Outcome: Progressing     Problem: Safety - Adult  Goal: Free from fall injury  7/3/2025 1025 by Elise Velasquez RN  Outcome: Progressing  7/2/2025 2147 by Vito Perez RN  Outcome: Progressing     Problem: ABCDS Injury Assessment  Goal: Absence of physical injury  7/3/2025 1025 by Elise Velasquez RN  Outcome: Progressing  7/2/2025 2147 by Vito Perez RN  Outcome: Progressing     Problem: Pain  Goal: Verbalizes/displays adequate comfort level or baseline comfort level  7/2/2025 2147 by Vito Perez RN  Outcome: Progressing

## 2025-07-06 LAB
BACTERIA BLD CULT ORG #2: NORMAL
BACTERIA BLD CULT: NORMAL

## 2025-07-07 ENCOUNTER — TELEPHONE (OUTPATIENT)
Dept: FAMILY MEDICINE CLINIC | Age: 85
End: 2025-07-07

## 2025-07-07 NOTE — TELEPHONE ENCOUNTER
Care Transitions Initial Follow Up Call    Outreach made within 2 business days of discharge: Yes    Patient: Inés Pineda Patient : 1940   MRN: [id number]  Reason for Admission: Atrial fibrillation with RVR (HCC)   Discharge Date: 7/3/25       Spoke with: UNABLE TO CONTACT PATIENT AT THIS TIME     Discharge department/facility: [facility]        Scheduled appointment with PCP within 7-14 days    Follow Up  Future Appointments   Date Time Provider Department Center   10/29/2025 11:15 AM [facility]   2025 10:45 AM Raj Gore DO [facility]   2026 11:30 AM Sandra Hammer APRN - CNP [facility] DEP       Jovita Rodrigez, MA

## 2025-07-22 NOTE — DISCHARGE SUMMARY
with atelectatic changes identified lung bases.  Findings suggesting interstitial edema.  No focal parenchymal opacification present. Upper Abdomen: Limited images of the upper abdomen reveals stones in the gallbladder. Soft Tissues/Bones: Multilevel degenerative changes identified within the spine.  No acute chest wall abnormality.     1. No evidence of pulmonary embolism. 2. Cardiomegaly with interstitial edema and trace bilateral pleural effusions. 3. Cholelithiasis.     XR CHEST PORTABLE  Result Date: 7/1/2025  EXAMINATION: ONE XRAY VIEW OF THE CHEST 7/1/2025 2:48 am COMPARISON: June 11, 2024 HISTORY: ORDERING SYSTEM PROVIDED HISTORY: SOB TECHNOLOGIST PROVIDED HISTORY: Reason for exam:->SOB What reading provider will be dictating this exam?->CRC FINDINGS: Normal cardiomediastinal silhouette. Lungs clear.  No pneumothorax or effusion. No acute body wall soft tissue abnormalities. Osseous thorax intact.     No acute cardiopulmonary process.       Discharge Medications:         Medication List        START taking these medications      furosemide 20 MG tablet  Commonly known as: Lasix  Take 2 tablets by mouth 2 times daily            CHANGE how you take these medications      metoprolol succinate 50 MG extended release tablet  Commonly known as: TOPROL XL  Take 3 tablets by mouth daily  What changed:   medication strength  how much to take     tretinoin 0.1 % cream  Commonly known as: RETIN-A  Apply topically nightly.  What changed:   how to take this  additional instructions            CONTINUE taking these medications      apixaban 2.5 MG Tabs tablet  Commonly known as: Eliquis  Take 1 tablet by mouth 2 times daily     B COMPLEX-C ER PO     B-12 PO     cholecalciferol 400 UNIT Tabs tablet  Commonly known as: VITAMIN D3     fluticasone 50 MCG/ACT nasal spray  Commonly known as: FLONASE  1 spray by Each Nostril route daily     levothyroxine 25 MCG tablet  Commonly known as: SYNTHROID  TAKE 1 TABLET EVERY DAY

## 2025-08-05 RX ORDER — METOPROLOL SUCCINATE 50 MG/1
150 TABLET, EXTENDED RELEASE ORAL DAILY
Qty: 30 TABLET | Refills: 5 | Status: SHIPPED | OUTPATIENT
Start: 2025-08-05

## 2025-08-05 RX ORDER — FUROSEMIDE 20 MG/1
40 TABLET ORAL 2 TIMES DAILY
Qty: 180 TABLET | Refills: 5 | Status: SHIPPED | OUTPATIENT
Start: 2025-08-05

## 2025-08-22 ENCOUNTER — OFFICE VISIT (OUTPATIENT)
Age: 85
End: 2025-08-22
Payer: MEDICARE

## 2025-08-22 VITALS
SYSTOLIC BLOOD PRESSURE: 128 MMHG | DIASTOLIC BLOOD PRESSURE: 86 MMHG | WEIGHT: 125.4 LBS | BODY MASS INDEX: 22.94 KG/M2 | HEART RATE: 77 BPM

## 2025-08-22 DIAGNOSIS — I48.19 PERSISTENT ATRIAL FIBRILLATION (HCC): Primary | ICD-10-CM

## 2025-08-22 DIAGNOSIS — I10 ESSENTIAL HYPERTENSION: ICD-10-CM

## 2025-08-22 DIAGNOSIS — I10 BENIGN ESSENTIAL HYPERTENSION: ICD-10-CM

## 2025-08-22 PROCEDURE — 1123F ACP DISCUSS/DSCN MKR DOCD: CPT | Performed by: INTERNAL MEDICINE

## 2025-08-22 PROCEDURE — 93000 ELECTROCARDIOGRAM COMPLETE: CPT | Performed by: INTERNAL MEDICINE

## 2025-08-22 PROCEDURE — 99214 OFFICE O/P EST MOD 30 MIN: CPT | Performed by: INTERNAL MEDICINE

## 2025-08-22 PROCEDURE — G8420 CALC BMI NORM PARAMETERS: HCPCS | Performed by: INTERNAL MEDICINE

## 2025-08-22 PROCEDURE — 1126F AMNT PAIN NOTED NONE PRSNT: CPT | Performed by: INTERNAL MEDICINE

## 2025-08-22 PROCEDURE — 1159F MED LIST DOCD IN RCRD: CPT | Performed by: INTERNAL MEDICINE

## 2025-08-22 PROCEDURE — 3074F SYST BP LT 130 MM HG: CPT | Performed by: INTERNAL MEDICINE

## 2025-08-22 PROCEDURE — 1036F TOBACCO NON-USER: CPT | Performed by: INTERNAL MEDICINE

## 2025-08-22 PROCEDURE — G8399 PT W/DXA RESULTS DOCUMENT: HCPCS | Performed by: INTERNAL MEDICINE

## 2025-08-22 PROCEDURE — 3079F DIAST BP 80-89 MM HG: CPT | Performed by: INTERNAL MEDICINE

## 2025-08-22 PROCEDURE — 1090F PRES/ABSN URINE INCON ASSESS: CPT | Performed by: INTERNAL MEDICINE

## 2025-08-22 PROCEDURE — G8427 DOCREV CUR MEDS BY ELIG CLIN: HCPCS | Performed by: INTERNAL MEDICINE

## 2025-08-22 RX ORDER — LISINOPRIL 5 MG/1
5 TABLET ORAL DAILY
Qty: 90 TABLET | Refills: 3 | Status: SHIPPED | OUTPATIENT
Start: 2025-08-22